# Patient Record
Sex: MALE | Race: BLACK OR AFRICAN AMERICAN | NOT HISPANIC OR LATINO
[De-identification: names, ages, dates, MRNs, and addresses within clinical notes are randomized per-mention and may not be internally consistent; named-entity substitution may affect disease eponyms.]

---

## 2018-06-22 ENCOUNTER — TRANSCRIPTION ENCOUNTER (OUTPATIENT)
Age: 73
End: 2018-06-22

## 2018-06-22 ENCOUNTER — APPOINTMENT (OUTPATIENT)
Dept: INTERNAL MEDICINE | Facility: CLINIC | Age: 73
End: 2018-06-22

## 2018-06-22 ENCOUNTER — MEDICATION RENEWAL (OUTPATIENT)
Age: 73
End: 2018-06-22

## 2018-06-22 VITALS
TEMPERATURE: 97.9 F | HEIGHT: 70 IN | WEIGHT: 155 LBS | DIASTOLIC BLOOD PRESSURE: 76 MMHG | BODY MASS INDEX: 22.19 KG/M2 | SYSTOLIC BLOOD PRESSURE: 127 MMHG | OXYGEN SATURATION: 94 % | HEART RATE: 58 BPM

## 2018-06-22 DIAGNOSIS — Z87.891 PERSONAL HISTORY OF NICOTINE DEPENDENCE: ICD-10-CM

## 2018-06-22 NOTE — HISTORY OF PRESENT ILLNESS
[FreeTextEntry1] : follow up fe def anemia [de-identified] : pt had recent low back pain and is on prednisone. He currently is taking 1 fe pill a day. Otherwise he feels well. no chest pain. no sob. stools are dark.

## 2018-06-25 LAB
FERRITIN SERPL-MCNC: 46 NG/ML
IRON SATN MFR SERPL: 38 %
IRON SERPL-MCNC: 105 UG/DL
TIBC SERPL-MCNC: 277 UG/DL
UIBC SERPL-MCNC: 172 UG/DL

## 2018-07-05 ENCOUNTER — MEDICATION RENEWAL (OUTPATIENT)
Age: 73
End: 2018-07-05

## 2018-07-23 ENCOUNTER — MEDICATION RENEWAL (OUTPATIENT)
Age: 73
End: 2018-07-23

## 2018-08-17 ENCOUNTER — MEDICATION RENEWAL (OUTPATIENT)
Age: 73
End: 2018-08-17

## 2018-08-27 ENCOUNTER — MEDICATION RENEWAL (OUTPATIENT)
Age: 73
End: 2018-08-27

## 2018-08-31 ENCOUNTER — MEDICATION RENEWAL (OUTPATIENT)
Age: 73
End: 2018-08-31

## 2018-10-11 ENCOUNTER — MEDICATION RENEWAL (OUTPATIENT)
Age: 73
End: 2018-10-11

## 2018-11-26 ENCOUNTER — MEDICATION RENEWAL (OUTPATIENT)
Age: 73
End: 2018-11-26

## 2018-12-03 ENCOUNTER — MEDICATION RENEWAL (OUTPATIENT)
Age: 73
End: 2018-12-03

## 2018-12-26 ENCOUNTER — MEDICATION RENEWAL (OUTPATIENT)
Age: 73
End: 2018-12-26

## 2018-12-28 ENCOUNTER — MEDICATION RENEWAL (OUTPATIENT)
Age: 73
End: 2018-12-28

## 2019-01-02 ENCOUNTER — MEDICATION RENEWAL (OUTPATIENT)
Age: 74
End: 2019-01-02

## 2019-01-04 ENCOUNTER — RECORD ABSTRACTING (OUTPATIENT)
Age: 74
End: 2019-01-04

## 2019-01-04 DIAGNOSIS — Z87.19 PERSONAL HISTORY OF OTHER DISEASES OF THE DIGESTIVE SYSTEM: ICD-10-CM

## 2019-01-04 DIAGNOSIS — Z87.442 PERSONAL HISTORY OF URINARY CALCULI: ICD-10-CM

## 2019-01-04 DIAGNOSIS — Z83.3 FAMILY HISTORY OF DIABETES MELLITUS: ICD-10-CM

## 2019-01-06 PROBLEM — Z87.19 HISTORY OF PANCREATITIS: Status: RESOLVED | Noted: 2019-01-06 | Resolved: 2019-01-06

## 2019-01-06 PROBLEM — Z83.3 FAMILY HISTORY OF DIABETES MELLITUS: Status: ACTIVE | Noted: 2019-01-06

## 2019-01-06 PROBLEM — Z87.442 HISTORY OF NEPHROLITHIASIS: Status: RESOLVED | Noted: 2019-01-06 | Resolved: 2019-01-06

## 2019-01-08 ENCOUNTER — NON-APPOINTMENT (OUTPATIENT)
Age: 74
End: 2019-01-08

## 2019-01-08 ENCOUNTER — MEDICATION RENEWAL (OUTPATIENT)
Age: 74
End: 2019-01-08

## 2019-01-08 ENCOUNTER — APPOINTMENT (OUTPATIENT)
Dept: CARDIOLOGY | Facility: CLINIC | Age: 74
End: 2019-01-08
Payer: COMMERCIAL

## 2019-01-08 VITALS
SYSTOLIC BLOOD PRESSURE: 138 MMHG | HEART RATE: 51 BPM | DIASTOLIC BLOOD PRESSURE: 70 MMHG | WEIGHT: 155 LBS | HEIGHT: 70 IN | BODY MASS INDEX: 22.19 KG/M2

## 2019-01-08 PROCEDURE — 99214 OFFICE O/P EST MOD 30 MIN: CPT

## 2019-01-08 PROCEDURE — 93000 ELECTROCARDIOGRAM COMPLETE: CPT

## 2019-01-08 RX ORDER — NEBIVOLOL HYDROCHLORIDE 5 MG/1
5 TABLET ORAL DAILY
Qty: 90 | Refills: 3 | Status: DISCONTINUED | COMMUNITY
End: 2019-01-08

## 2019-01-08 RX ORDER — AMLODIPINE BESYLATE 5 MG/1
5 TABLET ORAL DAILY
Qty: 90 | Refills: 3 | Status: DISCONTINUED | COMMUNITY
Start: 2018-12-03 | End: 2019-01-08

## 2019-01-08 RX ORDER — LOSARTAN POTASSIUM AND HYDROCHLOROTHIAZIDE 25; 100 MG/1; MG/1
100-25 TABLET ORAL DAILY
Qty: 90 | Refills: 3 | Status: DISCONTINUED | COMMUNITY
Start: 2018-11-26 | End: 2019-01-08

## 2019-01-08 RX ORDER — LOSARTAN POTASSIUM AND HYDROCHLOROTHIAZIDE 25; 100 MG/1; MG/1
100-25 TABLET ORAL DAILY
Qty: 90 | Refills: 3 | Status: DISCONTINUED | COMMUNITY
Start: 2018-08-31 | End: 2019-01-08

## 2019-01-08 RX ORDER — ATORVASTATIN CALCIUM 10 MG/1
10 TABLET, FILM COATED ORAL DAILY
Qty: 90 | Refills: 3 | Status: DISCONTINUED | COMMUNITY
Start: 2018-07-23 | End: 2019-01-08

## 2019-01-08 RX ORDER — ATORVASTATIN CALCIUM 10 MG/1
10 TABLET, FILM COATED ORAL DAILY
Qty: 90 | Refills: 3 | Status: DISCONTINUED | COMMUNITY
Start: 2018-10-11 | End: 2019-01-08

## 2019-01-08 RX ORDER — ATORVASTATIN CALCIUM 10 MG/1
10 TABLET, FILM COATED ORAL DAILY
Qty: 90 | Refills: 3 | Status: DISCONTINUED | COMMUNITY
Start: 2018-08-17 | End: 2019-01-08

## 2019-01-08 RX ORDER — ATORVASTATIN CALCIUM 10 MG/1
10 TABLET, FILM COATED ORAL DAILY
Qty: 90 | Refills: 3 | Status: DISCONTINUED | COMMUNITY
Start: 2018-12-26 | End: 2019-01-08

## 2019-01-08 NOTE — HISTORY OF PRESENT ILLNESS
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 He denies chest pain, dyspnea, palpitations or syncope. He exercises approximately 4 days a week with the elliptical without difficulty.

## 2019-01-15 ENCOUNTER — RX RENEWAL (OUTPATIENT)
Age: 74
End: 2019-01-15

## 2019-01-15 RX ORDER — ROSUVASTATIN CALCIUM 10 MG/1
10 TABLET, FILM COATED ORAL DAILY
Qty: 1 | Refills: 3 | Status: DISCONTINUED | COMMUNITY
Start: 2019-01-08 | End: 2019-01-15

## 2019-02-06 ENCOUNTER — APPOINTMENT (OUTPATIENT)
Dept: CARDIOLOGY | Facility: CLINIC | Age: 74
End: 2019-02-06
Payer: COMMERCIAL

## 2019-02-06 VITALS
DIASTOLIC BLOOD PRESSURE: 78 MMHG | WEIGHT: 154 LBS | HEIGHT: 70 IN | HEART RATE: 48 BPM | BODY MASS INDEX: 22.05 KG/M2 | SYSTOLIC BLOOD PRESSURE: 130 MMHG

## 2019-02-06 PROCEDURE — 93351 STRESS TTE COMPLETE: CPT

## 2019-02-06 NOTE — PHYSICAL EXAM
[General Appearance - Well Developed] : well developed [Normal Appearance] : normal appearance [Well Groomed] : well groomed [General Appearance - Well Nourished] : well nourished [No Deformities] : no deformities [General Appearance - In No Acute Distress] : no acute distress [Normal Conjunctiva] : the conjunctiva exhibited no abnormalities [Eyelids - No Xanthelasma] : the eyelids demonstrated no xanthelasmas [Normal Oral Mucosa] : normal oral mucosa [No Oral Pallor] : no oral pallor [No Oral Cyanosis] : no oral cyanosis [Normal Jugular Venous A Waves Present] : normal jugular venous A waves present [Normal Jugular Venous V Waves Present] : normal jugular venous V waves present [No Jugular Venous Mayers A Waves] : no jugular venous mayers A waves [Respiration, Rhythm And Depth] : normal respiratory rhythm and effort [Exaggerated Use Of Accessory Muscles For Inspiration] : no accessory muscle use [Auscultation Breath Sounds / Voice Sounds] : lungs were clear to auscultation bilaterally [Heart Rate And Rhythm] : heart rate and rhythm were normal [Heart Sounds] : normal S1 and S2 [Murmurs] : no murmurs present [Abdomen Soft] : soft [Abdomen Tenderness] : non-tender [Abdomen Mass (___ Cm)] : no abdominal mass palpated [Abnormal Walk] : normal gait [Gait - Sufficient For Exercise Testing] : the gait was sufficient for exercise testing [Nail Clubbing] : no clubbing of the fingernails [Cyanosis, Localized] : no localized cyanosis [Petechial Hemorrhages (___cm)] : no petechial hemorrhages [Skin Color & Pigmentation] : normal skin color and pigmentation [] : no rash [No Venous Stasis] : no venous stasis [Skin Lesions] : no skin lesions [No Skin Ulcers] : no skin ulcer [No Xanthoma] : no  xanthoma was observed [Oriented To Time, Place, And Person] : oriented to person, place, and time [Affect] : the affect was normal [Mood] : the mood was normal [No Anxiety] : not feeling anxious

## 2019-02-06 NOTE — DISCUSSION/SUMMARY
[FreeTextEntry1] : The patient is doing very well clinically. Today's stress echo revealed no evidence of exercise-induced myocardial ischemia. LV function was normal at rest and post exercise. The current medications will be continued and we are arranging for office followup.

## 2019-02-06 NOTE — REASON FOR VISIT
[FreeTextEntry1] : Patient is referred for stress echocardiography as part of his ongoing cardiology evaluation with Dr. Calle.

## 2019-02-12 LAB
ALBUMIN SERPL ELPH-MCNC: 4.9 G/DL
ALP BLD-CCNC: 50 U/L
ALT SERPL-CCNC: 16 U/L
AST SERPL-CCNC: 17 U/L
BILIRUB DIRECT SERPL-MCNC: 0.1 MG/DL
BILIRUB INDIRECT SERPL-MCNC: 0.4 MG/DL
BILIRUB SERPL-MCNC: 0.5 MG/DL
CHOLEST SERPL-MCNC: 125 MG/DL
CHOLEST/HDLC SERPL: 2.6 RATIO
HDLC SERPL-MCNC: 49 MG/DL
LDLC SERPL CALC-MCNC: 66 MG/DL
PROT SERPL-MCNC: 7.2 G/DL
TRIGL SERPL-MCNC: 50 MG/DL

## 2019-02-28 ENCOUNTER — APPOINTMENT (OUTPATIENT)
Dept: CARDIOLOGY | Facility: CLINIC | Age: 74
End: 2019-02-28
Payer: COMMERCIAL

## 2019-02-28 VITALS
SYSTOLIC BLOOD PRESSURE: 128 MMHG | HEIGHT: 70 IN | DIASTOLIC BLOOD PRESSURE: 75 MMHG | WEIGHT: 155 LBS | BODY MASS INDEX: 22.19 KG/M2 | HEART RATE: 57 BPM

## 2019-02-28 DIAGNOSIS — K64.9 UNSPECIFIED HEMORRHOIDS: ICD-10-CM

## 2019-02-28 DIAGNOSIS — N28.1 CYST OF KIDNEY, ACQUIRED: ICD-10-CM

## 2019-02-28 PROCEDURE — 99213 OFFICE O/P EST LOW 20 MIN: CPT

## 2019-02-28 PROCEDURE — 93000 ELECTROCARDIOGRAM COMPLETE: CPT

## 2019-02-28 NOTE — REASON FOR VISIT
[Follow-Up - Clinic] : a clinic follow-up of [Coronary Artery Disease] : coronary artery disease [FreeTextEntry2] : 1 month

## 2019-02-28 NOTE — HISTORY OF PRESENT ILLNESS
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 He denies chest pain, dyspnea, palpitations or syncope. He exercises approximately 4 days a week with the elliptical without difficulty. His stress echo was done feb6 th.

## 2019-03-01 ENCOUNTER — TRANSCRIPTION ENCOUNTER (OUTPATIENT)
Age: 74
End: 2019-03-01

## 2019-03-05 ENCOUNTER — LABORATORY RESULT (OUTPATIENT)
Age: 74
End: 2019-03-05

## 2019-03-18 ENCOUNTER — APPOINTMENT (OUTPATIENT)
Dept: ENDOCRINOLOGY | Facility: CLINIC | Age: 74
End: 2019-03-18
Payer: COMMERCIAL

## 2019-03-18 VITALS
SYSTOLIC BLOOD PRESSURE: 120 MMHG | HEIGHT: 70 IN | BODY MASS INDEX: 21.76 KG/M2 | DIASTOLIC BLOOD PRESSURE: 70 MMHG | HEART RATE: 56 BPM | WEIGHT: 152 LBS

## 2019-03-18 PROCEDURE — 99213 OFFICE O/P EST LOW 20 MIN: CPT

## 2019-03-18 NOTE — HISTORY OF PRESENT ILLNESS
[FreeTextEntry1] : March 18, 2019\par \par  PCP: Dr. Joe Teixeira \par             Card: Patricia Calle (Hx anginia, elev lipids \par             GI: Dr. Hayes (Hx anemia) - colonoscopy last week - OK\par             Ophthalmology: Santiago Solaresvaldemar p  f [phone]\par             Pod: Dr. KARIME Callahan - available\par             Ortho - Dr. Dee at [facility]\par            .\par            CC: Diabetes since 2007 (, A1c 11.9 %) ***\par             (anemia) \par             (L knee replaced - Dr. Lopez at [facility]) \par             (sleep challenges)\par             (ASHD)\par             ( 6/2018: back pain: scoliosis)\par             (6/2018: hepatic cyst)\par \par At [place] on\par 3/5/2019\par A1c 5.9\par B12 596\par ferritin 30 ()\par WBC 4.18\par Hct 41.3\par serum iron 103\par \par \par \par \par \par Previous notes from [employer] appended below.\par \par    Sept 17, 2018\par            .\par            PCP: Dr. Joe Teixeira \par             Card: Patricia Calle (Hx anginia, elev lipids \par             GI: Dr. Hayes (Hx anemia) - colonoscopy last week - OK\par             Ophthalmology: Santiago Solareslovaldemar p  f [phone]\par             Pod: Dr. KARIME Callahan - available\par             Ortho - Dr. Dee at [facility]\par            .\par            CC: Diabetes since 2007 (, A1c 11.9 %) ***\par             (anemia) \par             (L knee replaced - Dr. Lopez at [facility]) \par             (sleep challenges)\par             (ASHD)\par             ( 6/2018: back pain: scoliosis)\par             (6/2018: hepatic cyst)\par            .\par            5/16/2018 X-ray R hip: mild bilateral hip arthopathy....arterial vasc. calcifications, \par            .\par            6/20/2018 ER visit for back pain\par            -incidental . hepatic cyist detected\par            .\par            9/7/2018 [facility] included\par            microalbumin - 5\par            Hct 40.3\par            MCV 82.4 \par            A1c 5.8 % \par             m/gdl\par            creatinine 1.12\par            calcium 9.7 *****\par            uric acid 5.3 \par            GGT 24\par            \par            ESR 2\par            PTH 40\par            RF neg \par            immunofix: neg \par            Lyme - neg\par            25 OH vit D 45\par            PSA 2.1 \par            1,25 di OH vit D 47 (18-72)\par            ionized calcium normal\par            .\par            Impression: He is on ferrous gluconate for anemia and he would like to know his ferritin and Fe/TIBC.\par            .\par            Eye exam was excellent. .\par            .\par            ==\par            .\par            May 16, 2018\par            .\par            PCP: Dr. Joe Teixeira \par             Card: Patricia Calle (Hx anginia, elev lipids \par             GI: Dr. Hayes (Hx anemia) - colonoscopy last week - OK\par             Ophthalmology: Santiago Valles p [phone] f [phone]\par             Pod: Dr. KARIME Callahan - available\par             Ortho - Dr. Dee at [facility]\par            .\par            CC: Diabetes since 2007 (, A1c 11.9 %) ***\par             (anemia) \par             (L knee replaced - Dr. 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 \par            .\par            Impression: Feeling well.\par            Slightly elevated [facility]. \par            A1c in good range.\par            .\par            Plan: Same Rx. \par            .\par            ==\par            .\par            January 16, 2018\par            .\par            PCP: Dr. Joe Teixeira \par             Card: Patricia Calle (Hx anginia, elev lipids \par             GI: Dr. Hayes (Hx anemia) - colonoscopy last week - OK\par             Ophthalmology: Santiago Solareslovaldemar p [phone] f [phone]\par             Pod: Dr. KARIME Callahan - available\par             Ortho - Dr. Dee at [facility]\par            .\par            CC: Diabetes since 2007 (, A1c 11.9 %) ***\par             (anemia) \par             (L knee replaced - Dr. Lopez at [facility]) \par             (sleep challenges)\par             (ASHD)\par            .\par            Reports he saw ophthalmology a few months ago.\par            Currently has cough - saw Dr. Teixeira.\par            Has funny feeling in toes. \par            .\par            Impresion: He appears to be doing great !\par            .\par            ==\par            .\par            August 8, 2017\par            .\par            PCP: Dr. Joe Teixeira \par             Card: Patricia Calle (Hx anginia, elev lipids \par             GI: Dr. Hayes (Hx anemia) - colonoscopy last week - OK\par             Ophthalmology: [facility] tomorrow\par             Pod: Dr. KARIME Callahan - available\par             Ortho - Dr. Dee at [facility]\par            .\par            CC: Diabetes since 2007 (, A1c 11.9 %) ***\par             (anemia) \par             (L knee replaced - Dr. Lopez at [facility]) \par             (sleep challenges)\par             (ASHD)\par            .\par            Had colonoscopy about a year ago by Dr. Hayes.\par            Declined upper endoscopy.\par            Took ferrous gluconate.\par            Recent labs at [facility] on\par            7/25/2017 included\par            BS 98\par            TSH 2.29 \par            Hct 42.7\par            MCV 79.7\par            A1c 5.9\par            .\par            .\par            Impression: Diabetes under excellent control based on his own fingerstick sugars as well as recent A1c of 5.9%\par            .\par            Plan: He will see Dr. Teixeira very soon.\par            ROV in about six months. Thank you. -\par            .\par            ==\par            .\par            Feb 8, 2017\par            .\par            PCP: Dr. Joe Teixeira \par             Card: Patricia Calle (Hx anginia, elev lipids \par             GI: Dr. Hayes (Hx anemia) - colonoscopy last week - OK\par             Ophthalmology: [facility] tomorrow\par             Pod: Dr. KARIME Callahan - available\par             Ortho - Dr. Dee at [facility]\par            .\par            CC: Diabetes since 2007 (, A1c 11.9 %) ***\par             (anemia) \par             (L knee replaced - Dr. Lopez at [facility]) \par             (sleep challenges)\par             (ASHD)\par            .\par            On Bystolic and\par            JanuMet  BID for diabetes.\par            .\par            Recent [facility] A1c 6.2 %\par            urine microalbu 0.6 \par            .\par            Impression: Doing very well.\par            .\par            Plan: Reviewed fingerstick BS and guidelines.\par            Need for annual eye exam. \par            .\par            ==\par            .\par            August 8, 2016\par            .\par            PCP: Dr. Joe Teixeira \par             Card: Patricia Calle (Hx anginia, elev lipids \par             GI: Dr. Hayes (Hx anemia) - colonoscopy last week - OK\par             [facility]             Pod: Dr. KARIME Callahan - available\par             Ortho - Dr. Dee at [facility]\par            .\par            CC: Diabetes since 2007 (, A1c 11.9 %) ***\par             (anemia) \par             (L knee replaced - Dr. Lopez at [facility]) \par             (sleep challenges)\par             (ASHD)\par            .\par            Recent labs ([facility]) show\par             mg/dl\par            B12 461 \par            HbA1c 6.3 %\par            .\par            Impression: Doing excellent job of controlling sugars.\par            .\par            Plan: Same Rx: JanuMet, diet, exercise. \par            .\par            ==\par            .\par            February 15, 2016\par            .\par            PCP: Dr. Joe Teixeira \par             Card: Patricia Calle (Hx anginia, elev lipids \par             GI: Dr. Hayes (Hx anemia)\par             Ophthalmology: Santiago Orloff [place] good report\par             Pod: Dr. KARIME Callahan \par            .\par            CC: Diabetes since 2007 (, A1c 11.9 %) ***\par             (anemia) \par             (L knee replaced - Dr. Lopez [facility]) \par             (sleep challenges)\par             (ASHD)\par            .\par            Had recent evaluation for possible angina.\par            Studies turned out well.\par            Bystolic and isosorbide were prescribe.\par            Plans trip to [facility].\par            .\par            Eyes last checked a few months ago and was given a good report. \par            .\par            JanuMet  BID [facility] 60 days at a time\par            .\par            A1c 6.2 % (up from 6.0%)\par            .\par            Impression: Doing very well.\par            .\par            Plan: Restart fingerstick BS monitoring. \par            Updated A1c, B12, lipids in 4 months. \par            .\par            .\par            ==\par            .\par            August 19, 2015\par            .\par            PCP: Dr. Joe Teixeira \par             Card: Patricia Calle (Hx anginia, elev lipids \par             GI: Dr. Hayes (Hx anemia)\par             Ophthalmology: Santiago Orloff [place] good report\par             Pod: Dr. KARIME Callahan \par            .\par            CC: Diabetes since 2007 (, A1c 11.9 %) ***\par             (anemia) (L knee replaced - Dr. Lopez at [facility])\par             (sleep challenges)\par            .\par            Has an autographed copy of text by Bola Tim\par            Recent FBS 78 mg/dl\par            HbA1c 6.0%\par            .\par            Takes JanuMet  BID\par            .\par            Impression: Doing well. Notes some stomach rumbling so I will advise trial of decreasing the JanuMet to daily and repeating labs in about six months.\par            .\par            .\par            Plan: Same Rx and ROV six months. \par            .\par            ==\par            .\par            Feb 6, 2015\par            .\par            PCP: Dr. Joe Teixeira \par             Card: Patricia Calle (Hx anginia, elev lipids \par             GI: Dr. Hayes (Hx anemia)\par             Ophthalmology: [facility] \par             Pod: Dr. Callahan\par            .\par            CC: Diabetes since 2007 (, A1c 11.9 %)\par             (anemia) (L knee replaced - Dr. Lopez at [facility])\par             (Lyme disease)\par             (sleep challenges)\par            .\par            Now teaching genomics.\par            .\par            Lab Jan 2015\par            FBS 99, \par            Urine microalbumin normal\par            HDL 42\par            LDL 64\par            TSH 1.68 Hct 43.7\par            A1c 6.5%\par            .\par            He admits that he exercises a lot and eats properly.\par            .\par            Meds for DM: JanuMet  BID\par            Atovastin 10 mg\par            Amlodipine 5 mg\par            Hyzaar - HCTZ\par            ASA 81 mg/dl\par            Symbacort\par            .\par            Impression: Diabetes well controlled. \par            Tests fingerstick BS about once a week.\par            .\par            Plan: Same Rx and ROV 6 months.\par            .\par            .\par            .\par            .\par            ====\par            Dec 18, 2013\par            PCP: Dr. Teixeira\par            CC: DM since 2007\par            .\par            Summary of history below.\par            Going to [place] as a friend there is quite ill. \par            Says FBS may be 100 - 115. \par            FBS has drifted up a bit.\par            Will consider adding glipizide ER 2.5 mg in PM and\par            continue Janumet 50/500 BID for now.\par            ..\par            ===\par            May 17, 2013\par            PCP: Dr. Joe Teixeira Card: Patricia Calle GI: Dr. Hayes\par             Ophthalmology: Santiago Valles [facility] Pod: Dr. Callahan\par            CC: Diabetes (anemia) (L knee replaced - Dr. Lopez at [facility])\par            Tick bite Nov 2012 - Doxy from acupuncturist. \par            .\par            DM Dx'd by Dr. Teixeira 2007 after he developed dizziness and BS over 600 with A1c of 11.9%.\par            Last note appended below. Has seen Dr. Calle - has angina, heart murmur. He is supposed to restart his walking, eliptical, etc. \par            .\par            Remains on Janumet 50/500 BID Recent A1c higher at 6.7%. Urine microalbumin within normal limits. Lipids not part of this panel.\par            .\par            .\par            Did not f/u to GI regarding anemia, but it resolved on Fe. \par            He feels that muscle pain may benefit from re-starting HGH. \par            .\par            .\par            +++++++++\par            # Hx angina - Dr. Calle - on Lipitor\par            # Recent anemia - colonoscopy by Dr. Hayes, declined upper endoscopy, Hct now back to normal\par            # Diabetes: on Janumet 50/500 BID. FBS in good range. A1c 6.1% Feels well. Recentlyu went to [place]. February 15, 2016\par            .\par            PCP: Dr. Joe Teixeira \par             Card: Patricia Calle (Hx anginia, elev lipids \par             GI: Dr. Hayes (Hx anemia)\par             [facility] [place] [facility]\par             Pod: Dr. KARIME Callahan \par            .\par            CC: Diabetes since 2007 (, A1c 11.9 %) ***\par             (anemia) \par             (L knee replaced - Dr. Lopez at [facility]) \par             (sleep challenges)\par             (ASHD)\par            .\par            Had recent evaluation for possible angina.\par            Studies turned out well.\par            Bystolic and isosorbide were prescribe.\par            Plans trip to [facility].\par            .\par            Eyes last checked a few months ago and was given a good report. \par            .\par            JanuMet  BID [facility] 60 days at a time\par            .\par            A1c 6.2 % (up from 6.0%)\par            .\par            Impression: Doing very well.\par            .\par            Plan: Restart fingerstick BS monitoring. \par            Updated A1c, B12, lipids in 4 months. \par            .\par            .\par            ==\par            .\par            August 19, 2015\par            .\par            PCP: Dr. Joe Teixeira \par             Card: Patricia Calle (Hx anginia, elev lipids \par             GI: Dr. Hayes (Hx anemia)\par             Ophthalmology: Santiago Valles [place] good report\par             Pod: Dr. KARIME Callahan \par            .\par            CC: Diabetes since 2007 (, A1c 11.9 %) ***\par             (anemia) (L knee replaced - Dr. Lopez at [facility])\par             (sleep challenges)\par            .\par            Has an autographed copy of text by Bola Tim\par            Recent FBS 78 mg/dl\par            HbA1c 6.0%\par            .\par            Takes JanuMet  BID\par            .\par            Impression: Doing well. Notes some stomach rumbling so I will advise trial of decreasing the JanuMet to daily and repeating labs in about six months.\par            .\par            .\par            Plan: Same Rx and ROV six months. \par            .\par            ==\par            .\par            Feb 6, 2015\par            .\par            PCP: Dr. Joe Teixeira \par             Card: Patricia Calle (Hx anginia, elev lipids \par             GI: Dr. Hayes (Hx anemia)\par             Ophthalmology: Santiago Valles [facility] \par             Pod: Dr. Callahan\par            .\par            CC: Diabetes since 2007 (, A1c 11.9 %)\par             (anemia) (L knee replaced - Dr. Lopez at [facility])\par             (Lyme disease)\par             (sleep challenges)\par            .\par            Now teaching genomics.\par            .\par            Lab Jan 2015\par            FBS 99, \par            Urine microalbumin normal\par            HDL 42\par            LDL 64\par            TSH 1.68 Hct 43.7\par            A1c 6.5%\par            .\par            He admits that he exercises a lot and eats properly.\par            .\par            Meds for DM: JanuMet  BID\par            Atovastin 10 mg\par            Amlodipine 5 mg\par            Hyzaar - HCTZ\par            ASA 81 mg/dl\par            Symbacort\par            .\par            Impression: Diabetes well controlled. \par            Tests fingerstick BS about once a week.\par            .\par            Plan: Same Rx and ROV 6 months.\par            .\par            .\par            .\par            .\par            ====\par            Dec 18, 2013\par            PCP: Dr. Teixeira\par            CC: DM since 2007\par            .\par            Summary of history below.\par            Going to [place] as a friend there is quite ill. \par            Says FBS may be 100 - 115. \par            FBS has drifted up a bit.\par            Will consider adding glipizide ER 2.5 mg in PM and\par            continue Janumet 50/500 BID for now.\par            ..\par            ===\par            May 17, 2013\par            PCP: Dr. Joe Teixeira Card: Patricia Calle GI: Dr. Hayes\par             Ophthalmology: [facility] Pod: Dr. Callahan\par            CC: Diabetes (anemia) (L knee replaced - Dr. Lopez at [facility])\par            Tick bite Nov 2012 - Doxy from acupuncturist. \par            .\par            DM Dx'd by Dr. Teixeira 2007 after he developed dizziness and BS over 600 with A1c of 11.9%.\par            Last note appended below. Has seen Dr. Calle - has angina, heart murmur. He is supposed to restart his walking, eliptical, etc. \par            .\par            Remains on Janumet 50/500 BID Recent A1c higher at 6.7%. Urine microalbumin within normal limits. Lipids not part of this panel.\par            .\par            .\par            Did not f/u to GI regarding anemia, but it resolved on Fe. \par            He feels that muscle pain may benefit from re-starting HGH. \par            .\par            .\par            +++++++++\par            # Hx angina - Dr. Calle - on Lipitor\par            # Recent anemia - colonoscopy by Dr. Hayes, declined upper endoscopy, Hct now back to normal\par            # Diabetes: on Janumet 50/500 BID. FBS in good range. A1c 6.1% Feels well. Recentlyu went to [place]. February 15, 2016\par            .\par            PCP: Dr. Joe Teixeira \par             Card: Patricia Calle (Hx anginia, elev lipids \par             GI: Dr. Hayes (Hx anemia)\par             Ophthalmology: Santiago Orloff [place] good report\par             Pod: Dr. KARIME Callahan \par            .\par            CC: Diabetes since 2007 (, A1c 11.9 %) ***\par             (anemia) \par             (L knee replaced - Dr. Lopez at [facility]) \par             (sleep challenges)\par             (ASHD)\par            .\par            Had recent evaluation for possible angina.\par            Studies turned out well.\par            Bystolic and isosorbide were prescribe.\par            Plans trip to [facility].\par            .\par            Eyes last checked a few months ago and was given a good report. \par            .\par            JanuMet  BID [facility] 60 days at a time\par            .\par            A1c 6.2 % (up from 6.0%)\par            .\par            Impression: Doing very well.\par            .\par            Plan: Restart fingerstick BS monitoring. \par            Updated A1c, B12, lipids in 4 months. \par            .\par            .\par            ==\par            .\par            August 19, 2015\par            .\par            PCP: Dr. Joe Teixeira \par             Card: Patrciia Calle (Hx anginia, elev lipids \par             GI: Dr. Hayes (Hx anemia)\par             Ophthalmology: Santiago Solaresvaldemar [place] good report\par             Pod: Dr. KARIME Callahan \par            .\par            CC: Diabetes since 2007 (, A1c 11.9 %) ***\par             (anemia) (L knee replaced - Dr. Lopez at [facility])\par             (sleep challenges)\par            .\par            Has an autographed copy of text by Bola Tim\par            Recent FBS 78 mg/dl\par            HbA1c 6.0%\par            .\par            Takes JanuMet  BID\par            .\par            Impression: Doing well. Notes some stomach rumbling so I will advise trial of decreasing the JanuMet to daily and repeating labs in about six months.\par            .\par            .\par            Plan: Same Rx and ROV six months. \par            .\par            ==\par            .\par            Feb 6, 2015\par            .\par            PCP: Dr. Joe Teixeira \par             Card: Patricia Calle (Hx anginia, elev lipids \par             GI: Dr. Hayes (Hx anemia)\par             Ophthalmology: Santiago Valles [facility] \par             Pod: Dr. Callahan\par            .\par            CC: Diabetes since 2007 (, A1c 11.9 %)\par             (anemia) (L knee replaced - Dr. Lopez at [facility])\par             (Lyme disease)\par             (sleep challenges)\par            .\par            Now teaching genomics.\par            .\par            Lab Jan 2015\par            FBS 99, \par            Urine microalbumin normal\par            HDL 42\par            LDL 64\par            TSH 1.68 Hct 43.7\par            A1c 6.5%\par            .\par            He admits that he exercises a lot and eats properly.\par            .\par            Meds for DM: JanuMet  BID\par            Atovastin 10 mg\par            Amlodipine 5 mg\par            Hyzaar - HCTZ\par            ASA 81 mg/dl\par            Symbacort\par            .\par            Impression: Diabetes well controlled. \par            Tests fingerstick BS about once a week.\par            .\par            Plan: Same Rx and ROV 6 months.\par            .\par            .\par            .\par            .\par            ====\par            Dec 18, 2013\par            PCP: Dr. Teixeira\par            CC: DM since 2007\par            .\par            Summary of history below.\par            Going to [place] as a friend there is quite ill. \par            Says FBS may be 100 - 115. \par            FBS has drifted up a bit.\par            Will consider adding glipizide ER 2.5 mg in PM and\par            continue Janumet 50/500 BID for now.\par            ..\par            ===\par            May 17, 2013\par            PCP: Dr. Joe Teixeira Card: Patricia Calle GI: Dr. Hayes\par             Ophthalmology: Santiago Valles [facility] Pod: Dr. Callahan\par            CC: Diabetes (anemia) (L knee replaced - Dr. Lopez at [facility])\par            Tick bite Nov 2012 - Doxy from acupuncturist. \par            .\par            DM Dx'd by Dr. Teixeira 2007 after he developed dizziness and BS over 600 with A1c of 11.9%.\par            Last note appended below. Has seen Dr. Calle - has angina, heart murmur. He is supposed to restart his walking, eliptical, etc. \par            .\par            Remains on Janumet 50/500 BID Recent A1c higher at 6.7%. Urine microalbumin within normal limits. Lipids not part of this panel.\par            .\par            .\par            Did not f/u to GI regarding anemia, but it resolved on Fe. \par            He feels that muscle pain may benefit from re-starting HGH. \par            .\par            .\par            +++++++++\par            # Hx angina - Dr. Calle - on Lipitor\par            # Recent anemia - colonoscopy by Dr. Hayes, declined upper endoscopy, Hct now back to normal\par            # Diabetes: on Janumet 50/500 BID. FBS in good range. A1c 6.1% Feels well. Recentlyu went to [place]\par

## 2019-03-18 NOTE — PHYSICAL EXAM
[No Acute Distress] : no acute distress [Alert] : alert [Well Developed] : well developed [Well Nourished] : well nourished [Normal Sclera/Conjunctiva] : normal sclera/conjunctiva [EOMI] : extra ocular movement intact [No Proptosis] : no proptosis [Normal Oropharynx] : the oropharynx was normal [Thyroid Not Enlarged] : the thyroid was not enlarged [No Thyroid Nodules] : there were no palpable thyroid nodules [No Accessory Muscle Use] : no accessory muscle use [No Respiratory Distress] : no respiratory distress [Clear to Auscultation] : lungs were clear to auscultation bilaterally [Normal Rate] : heart rate was normal  [Normal S1, S2] : normal S1 and S2 [Regular Rhythm] : with a regular rhythm [Pedal Pulses Normal] : the pedal pulses are present [No Edema] : there was no peripheral edema [Normal Bowel Sounds] : normal bowel sounds [Not Tender] : non-tender [Soft] : abdomen soft [Not Distended] : not distended [Anterior Cervical Nodes] : anterior cervical nodes [Post Cervical Nodes] : posterior cervical nodes [Axillary Nodes] : axillary nodes [Normal] : normal and non tender [No Spinal Tenderness] : no spinal tenderness [Spine Straight] : spine straight [No Stigmata of Cushings Syndrome] : no stigmata of cushings syndrome [Normal Gait] : normal gait [Normal Strength/Tone] : muscle strength and tone were normal [No Rash] : no rash [Acanthosis Nigricans] : no acanthosis nigricans [Normal Reflexes] : deep tendon reflexes were 2+ and symmetric [No Tremors] : no tremors [Oriented x3] : oriented to person, place, and time

## 2019-05-10 ENCOUNTER — APPOINTMENT (OUTPATIENT)
Dept: INTERNAL MEDICINE | Facility: CLINIC | Age: 74
End: 2019-05-10
Payer: COMMERCIAL

## 2019-05-10 VITALS
OXYGEN SATURATION: 98 % | BODY MASS INDEX: 21.62 KG/M2 | WEIGHT: 151 LBS | HEART RATE: 44 BPM | SYSTOLIC BLOOD PRESSURE: 110 MMHG | HEIGHT: 70 IN | DIASTOLIC BLOOD PRESSURE: 80 MMHG

## 2019-05-10 LAB
APPEARANCE: CLEAR
BILIRUBIN URINE: NORMAL
BLOOD URINE: NORMAL
COLOR: YELLOW
GLUCOSE QUALITATIVE U: NORMAL
KETONES URINE: NORMAL
LEUKOCYTE ESTERASE URINE: NORMAL
NITRITE URINE: NORMAL
PH URINE: 5.5
PROTEIN URINE: NORMAL
SPECIFIC GRAVITY URINE: 1.02
UROBILINOGEN URINE: 0.2

## 2019-05-10 PROCEDURE — 36415 COLL VENOUS BLD VENIPUNCTURE: CPT

## 2019-05-10 PROCEDURE — 99214 OFFICE O/P EST MOD 30 MIN: CPT | Mod: 25

## 2019-05-10 NOTE — PHYSICAL EXAM
[No Acute Distress] : no acute distress [Well Nourished] : well nourished [Well Developed] : well developed [Well-Appearing] : well-appearing [PERRL] : pupils equal round and reactive to light [Normal Sclera/Conjunctiva] : normal sclera/conjunctiva [EOMI] : extraocular movements intact [Normal Outer Ear/Nose] : the outer ears and nose were normal in appearance [Normal Oropharynx] : the oropharynx was normal [No JVD] : no jugular venous distention [Supple] : supple [No Lymphadenopathy] : no lymphadenopathy [Thyroid Normal, No Nodules] : the thyroid was normal and there were no nodules present [No Respiratory Distress] : no respiratory distress  [Clear to Auscultation] : lungs were clear to auscultation bilaterally [No Accessory Muscle Use] : no accessory muscle use [Normal Rate] : normal rate  [Regular Rhythm] : with a regular rhythm [No Murmur] : no murmur heard [Normal S1, S2] : normal S1 and S2 [No Carotid Bruits] : no carotid bruits [No Abdominal Bruit] : a ~M bruit was not heard ~T in the abdomen [No Varicosities] : no varicosities [Pedal Pulses Present] : the pedal pulses are present [No Edema] : there was no peripheral edema [No Extremity Clubbing/Cyanosis] : no extremity clubbing/cyanosis [No Palpable Aorta] : no palpable aorta [Non Tender] : non-tender [Soft] : abdomen soft [Non-distended] : non-distended [No HSM] : no HSM [No Masses] : no abdominal mass palpated [Normal Sphincter Tone] : normal sphincter tone [Normal Bowel Sounds] : normal bowel sounds [No Mass] : no mass [Normal Posterior Cervical Nodes] : no posterior cervical lymphadenopathy [Normal Anterior Cervical Nodes] : no anterior cervical lymphadenopathy [No CVA Tenderness] : no CVA  tenderness [No Spinal Tenderness] : no spinal tenderness [Grossly Normal Strength/Tone] : grossly normal strength/tone [No Joint Swelling] : no joint swelling [No Rash] : no rash [Normal Gait] : normal gait [Coordination Grossly Intact] : coordination grossly intact [No Focal Deficits] : no focal deficits [Normal Affect] : the affect was normal [Deep Tendon Reflexes (DTR)] : deep tendon reflexes were 2+ and symmetric [Normal Insight/Judgement] : insight and judgment were intact [FreeTextEntry1] : 2+ prostate

## 2019-05-10 NOTE — HISTORY OF PRESENT ILLNESS
[FreeTextEntry1] : Routine yearly followup [de-identified] : Patient is a 73-year-old male with a history of coronary artery disease, diabetes, hypertension and asthma. He is very well controlled with no symptoms of chest pain, shortness of breath. He has seen his endocrinologist last hemoglobin A1c was 5.9. He had a stress echo in February which was normal. Patient is is entirely asymptomatic. He does have occasional right shoulder pain. He wants to have his measles titers checked.

## 2019-05-10 NOTE — ASSESSMENT
[FreeTextEntry1] : Patient is clinically stable. Labs were reviewed. Patient is to continue current medication. We'll check a PSA and a measles titer. Patient is to return in 6 months

## 2019-05-15 LAB
MEV IGG FLD QL IA: 31.7 AU/ML
MEV IGG+IGM SER-IMP: POSITIVE
PSA SERPL-MCNC: 1.88 NG/ML

## 2019-05-22 ENCOUNTER — APPOINTMENT (OUTPATIENT)
Dept: INTERNAL MEDICINE | Facility: CLINIC | Age: 74
End: 2019-05-22
Payer: COMMERCIAL

## 2019-05-22 VITALS
OXYGEN SATURATION: 96 % | HEART RATE: 46 BPM | SYSTOLIC BLOOD PRESSURE: 130 MMHG | BODY MASS INDEX: 20.9 KG/M2 | WEIGHT: 146 LBS | DIASTOLIC BLOOD PRESSURE: 80 MMHG | HEIGHT: 70 IN

## 2019-05-22 PROCEDURE — 99214 OFFICE O/P EST MOD 30 MIN: CPT

## 2019-05-22 NOTE — ASSESSMENT
[FreeTextEntry1] : Patient apparently had an asthma flare and to cat dander exposure. He is now clinically well. He remains on Symbicort. We'll make sure patient has a rescue inhaler available. Patient is to monitor his weight if he is continuing to lose weight will require CAT scans. Issues regarding living will have been discussed.

## 2019-05-22 NOTE — PHYSICAL EXAM

## 2019-05-22 NOTE — HISTORY OF PRESENT ILLNESS
[FreeTextEntry1] : Patient for evaluation of recent asthma flare [de-identified] : One week ago the patient was traveling to [place]. He was exposed to cat dander and had a major episode with shortness of breath, chest tightness and wheezing. He went to the emergency room and was hospitalized for one day. He was discharged on 4 days of prednisone. He feels fine now without shortness of breath check chest tightness or wheezing. Patient is also a bit concerned that he's been losing weight possibly about 5 pounds over the past one month. His diet is unchanged and he exercises regularly. He otherwise feels well. Patient also brings up the issue of his living will wishes. He states he does not want to be kept alive on artificial means if there is no hope for survival or significant improvement. If there is a reversible factor he would want to be kept alive on artificial means.

## 2019-05-22 NOTE — REVIEW OF SYSTEMS
[Recent Change In Weight] : ~T recent weight change [Negative] : Heme/Lymph [FreeTextEntry2] : see above

## 2019-06-07 ENCOUNTER — RX RENEWAL (OUTPATIENT)
Age: 74
End: 2019-06-07

## 2019-06-12 ENCOUNTER — MEDICATION RENEWAL (OUTPATIENT)
Age: 74
End: 2019-06-12

## 2019-07-08 ENCOUNTER — RX RENEWAL (OUTPATIENT)
Age: 74
End: 2019-07-08

## 2019-07-15 ENCOUNTER — APPOINTMENT (OUTPATIENT)
Dept: CARDIOLOGY | Facility: CLINIC | Age: 74
End: 2019-07-15

## 2019-07-23 ENCOUNTER — MEDICATION RENEWAL (OUTPATIENT)
Age: 74
End: 2019-07-23

## 2019-08-23 ENCOUNTER — RX RENEWAL (OUTPATIENT)
Age: 74
End: 2019-08-23

## 2019-09-12 ENCOUNTER — NON-APPOINTMENT (OUTPATIENT)
Age: 74
End: 2019-09-12

## 2019-09-12 ENCOUNTER — APPOINTMENT (OUTPATIENT)
Dept: CARDIOLOGY | Facility: CLINIC | Age: 74
End: 2019-09-12
Payer: COMMERCIAL

## 2019-09-12 VITALS
SYSTOLIC BLOOD PRESSURE: 140 MMHG | HEIGHT: 70 IN | HEART RATE: 57 BPM | DIASTOLIC BLOOD PRESSURE: 70 MMHG | WEIGHT: 145 LBS | BODY MASS INDEX: 20.76 KG/M2

## 2019-09-12 DIAGNOSIS — N52.9 MALE ERECTILE DYSFUNCTION, UNSPECIFIED: ICD-10-CM

## 2019-09-12 PROCEDURE — 93000 ELECTROCARDIOGRAM COMPLETE: CPT

## 2019-09-12 PROCEDURE — 99214 OFFICE O/P EST MOD 30 MIN: CPT

## 2019-09-12 RX ORDER — LOSARTAN POTASSIUM AND HYDROCHLOROTHIAZIDE 25; 100 MG/1; MG/1
100-25 TABLET ORAL DAILY
Refills: 0 | Status: DISCONTINUED | COMMUNITY
End: 2019-09-12

## 2019-09-12 RX ORDER — ATORVASTATIN CALCIUM 10 MG/1
10 TABLET, FILM COATED ORAL DAILY
Qty: 90 | Refills: 3 | Status: DISCONTINUED | COMMUNITY
Start: 2019-01-08 | End: 2019-09-12

## 2019-09-12 NOTE — HISTORY OF PRESENT ILLNESS
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 He denies chest pain, dyspnea, palpitations or syncope. He exercises approximately 4 days a week with the elliptical without difficulty. \par He had an er visit for asthma  in May while visiting a friend with a cat at [facility], he was kept overnight followed up with Dr Teixeira.\par He denies chest pain, dyspnea, palpitations or syncope.\par He goes to the gym or walks up the [place] at least every other day.

## 2019-09-12 NOTE — REASON FOR VISIT
[Follow-Up - Clinic] : a clinic follow-up of [Coronary Artery Disease] : coronary artery disease [FreeTextEntry2] : 6 month

## 2019-10-25 ENCOUNTER — APPOINTMENT (OUTPATIENT)
Dept: ENDOCRINOLOGY | Facility: CLINIC | Age: 74
End: 2019-10-25
Payer: COMMERCIAL

## 2019-10-25 VITALS
HEIGHT: 70 IN | WEIGHT: 144 LBS | SYSTOLIC BLOOD PRESSURE: 120 MMHG | HEART RATE: 52 BPM | BODY MASS INDEX: 20.62 KG/M2 | DIASTOLIC BLOOD PRESSURE: 70 MMHG

## 2019-10-25 PROCEDURE — 36415 COLL VENOUS BLD VENIPUNCTURE: CPT

## 2019-10-25 PROCEDURE — 99214 OFFICE O/P EST MOD 30 MIN: CPT | Mod: 25

## 2019-10-25 NOTE — HISTORY OF PRESENT ILLNESS
[FreeTextEntry1] : Oct 25, 2019\par \par PCP: Dr. Joe Teixeira \par             Card: Patricia Calle (Hx anginia, elev lipids \par             GI: Dr. Hayes (Hx anemia) - colonoscopy last week - OK\par             Ophthalmology: Santiago Valles p  f [phone]\par             Pod: Dr. KARIME Callahan - available\par             Ortho - Dr. Dee at [facility]\par            .\par            CC: Diabetes since 2007 (, A1c 11.9 %) ***\par             (anemia) \par             (L knee replaced - Dr. Lopez at [facility]) \par             (sleep challenges)\par             (ASHD)\par             ( 6/2018: back pain: scoliosis)\par             (6/2018: hepatic cyst)\par \par Visits for diabetes for which he takes JanuMet  BID.\par NR\par Last A1c in March 2019 was 5.9 %\par \par Impression:  By history, doing well.  Last saw ophthalmology during the summer.\par \par Plan:  Updated A1c today.\par ROV in March or April with a few records.  \par \par \par \par \par March 18, 2019\par \par  PCP: Dr. Joe Teixeira \par             Card: Patricia Calle (Hx anginia, elev lipids \par             GI: Dr. Hayes (Hx anemia) - colonoscopy last week - OK\par             Ophthalmology: Santiago Valles p  f [phone]\par             Pod: Dr. KARIME Callahan - available\par             Ortho - Dr. Dee at [facility]\par            .\par            CC: Diabetes since 2007 (, A1c 11.9 %) ***\par             (anemia) \par             (L knee replaced - Dr. Lopez at [facility]) \par             (sleep challenges)\par             (ASHD)\par             ( 6/2018: back pain: scoliosis)\par             (6/2018: hepatic cyst)\par \par At [place] on\par 3/5/2019\par A1c 5.9\par B12 596\par ferritin 30 ()\par WBC 4.18\par Hct 41.3\par serum iron 103\par \par \par \par \par \par Previous notes from [employer] appended below.\par \par    Sept 17, 2018\par            .\par            PCP: Dr. Joe Teixeira \par             Card: Patricia aClle (Hx anginia, elev lipids \par             GI: Dr. Hayes (Hx anemia) - colonoscopy last week - OK\par             Ophthalmology: Santiago Valles p  f [phone]\par             Pod: Dr. KARIME Cabrerar - available\par             Ortho - Dr. Dee at  -  [facility]\par            .\par            CC: Diabetes since 2007 (, A1c 11.9 %) ***\par             (anemia) \par             (L knee replaced - Dr. Lopez at [facility]) \par             (sleep challenges)\par             (ASHD)\par             ( 6/2018: back pain: scoliosis)\par             (6/2018: hepatic cyst)\par            .\par            5/16/2018 X-ray R hip: mild bilateral hip arthopathy....arterial vasc. calcifications, \par            .\par            6/20/2018 ER visit for back pain\par            -incidental . hepatic cyist detected\par            .\par            9/7/2018 [facility] included\par            microalbumin - 5\par            Hct 40.3\par            MCV 82.4 \par            A1c 5.8 % \par             m/gdl\par            creatinine 1.12\par            calcium 9.7 *****\par            uric acid 5.3 \par            GGT 24\par            \par            ESR 2\par            PTH 40\par            RF neg \par            immunofix: neg \par            Lyme - neg\par            25 OH vit D 45\par            PSA 2.1 \par            1,25 di OH vit D 47 (18-72)\par            ionized calcium normal\par            .\par            Impression: He is on ferrous gluconate for anemia and he would like to know his ferritin and Fe/TIBC.\par            .\par            Eye exam was excellent. .\par            .\par            ==\par            .\par            May 16, 2018\par            .\par            PCP: Dr. Joe Teixeira \par             Card: Patricia Calle (Hx anginia, elev lipids \par             GI: Dr. Hayes (Hx anemia) - colonoscopy last week - OK\par             Ophthalmology: Santiago Valles p  f [phone]\par             Pod: Dr. KARIME Callahan - available\par             Ortho - Dr. Dee at [facility]\par            .\par            CC: Diabetes since 2007 (, A1c 11.9 %) ***\par             (anemia) \par             (L knee replaced - Dr. 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 \par            .\par            Impression: Feeling well.\par            Slightly elevated [facility]. \par            A1c in good range.\par            .\par            Plan: Same Rx. \par            .\par            ==\par            .\par            January 16, 2018\par            .\par            PCP: Dr. Joe Teixeira \par             Card: Patricia Calle (Hx anginia, elev lipids \par             GI: Dr. Hayes (Hx anemia) - colonoscopy last week - OK\par             Ophthalmology: Santiago Valles p  f [phone]\par             Pod: Dr. KARIME Callahan - available\par             Ortho - Dr. Dee at [facility]\par            .\par            CC: Diabetes since 2007 (, A1c 11.9 %) ***\par             (anemia) \par             (L knee replaced - Dr. Lopez at [facility]) \par             (sleep challenges)\par             (ASHD)\par            .\par            Reports he saw ophthalmology a few months ago.\par            Currently has cough - saw Dr. Teixeira.\par            Has funny feeling in toes. \par            .\par            Impresion: He appears to be doing great !\par            .\par            ==\par            .\par            August 8, 2017\par            .\par            PCP: Dr. Joe Teixeira \par             Card: Patricia Calle (Hx anginia, elev lipids \par             GI: Dr. Hayes (Hx anemia) - colonoscopy last week - OK\par             [facility]: [facility]\par             Pod: Dr. KARIME Callahan - available\par             Ortho - Dr. Dee at [facility]\par            .\par            CC: Diabetes since 2007 (, A1c 11.9 %) ***\par             (anemia) \par             (L knee replaced - Dr. Lopez at [facility]) \par             (sleep challenges)\par             (ASHD)\par            .\par            Had colonoscopy about a year ago by Dr. Hayes.\par            Declined upper endoscopy.\par            Took ferrous gluconate.\par            Recent labs at [facility] on\par            7/25/2017 included\par            BS 98\par            TSH 2.29 \par            Hct 42.7\par            MCV 79.7\par            A1c 5.9\par            .\par            .\par            Impression: Diabetes under excellent control based on his own fingerstick sugars as well as recent A1c of 5.9%\par            .\par            Plan: He will see Dr. Teixeira very soon.\par            ROV in about six months. Thank you. -jh\par            .\par            ==\par            .\par            Feb 8, 2017\par            .\par            PCP: Dr. Joe Teixeira \par             Card: Patricia Calle (Hx anginia, elev lipids \par             GI: Dr. Hayes (Hx anemia) - colonoscopy last week - OK\par             Ophthalmology: [facility] tomorrow\par             Pod: Dr. KARIME Callahan - available\par             Ortho - Dr. Dee at  -  [facility]\par            .\par            CC: Diabetes since 2007 (, A1c 11.9 %) ***\par             (anemia) \par             (L knee replaced - Dr. Lopez at [facility]) \par             (sleep challenges)\par             (ASHD)\par            .\par            On Bystolic and\par            JanuMet  BID for diabetes.\par            .\par            Recent [facility] A1c 6.2 %\par            urine microalbu 0.6 \par            .\par            Impression: Doing very well.\par            .\par            Plan: Reviewed fingerstick BS and guidelines.\par            Need for annual eye exam. \par            .\par            ==\par            .\par            August 8, 2016\par            .\par            PCP: Dr. Joe Teixeira \par             Card: Patricia Calle (Hx anginia, elev lipids \par             GI: Dr. Hayes (Hx anemia) - colonoscopy last week - OK\par             Ophthalmology: [facility] tomorrow\par             Pod: Dr. S. Proner - available\par             Ortho - Dr. Dee at  - [facility]\par            .\par            CC: Diabetes since 2007 (, A1c 11.9 %) ***\par             (anemia) \par             (L knee replaced - Dr. Lopez at [facility]) \par             (sleep challenges)\par             (ASHD)\par            .\par            Recent labs ([facility]) show\par             mg/dl\par            B12 461 \par            HbA1c 6.3 %\par            .\par            Impression: Doing excellent job of controlling sugars.\par            .\par            Plan: Same Rx: JanuMet, diet, exercise. \par            .\par            ==\par            .\par            February 15, 2016\par            .\par            PCP: Dr. Joe Teixeira \par             Card: Patricia Calle (Hx anginia, elev lipids \par             GI: Dr. Hayes (Hx anemia)\par             [facility] good [facility]\par             Pod: Dr. KARIME Callahan \par            .\par            CC: Diabetes since 2007 (, A1c 11.9 %) ***\par             (anemia) \par             (L knee replaced - Dr. Lopez at [facility]) \par             (sleep challenges)\par             (ASHD)\par            .\par            Had recent evaluation for possible angina.\par            Studies turned out well.\par            Bystolic and isosorbide were prescribe.\par            Plans trip to [facility].\par            .\par            Eyes last checked a few months ago and was given a good report. \par            .\par            JanuMet  BID [facility] 60 days at a time\par            .\par            A1c 6.2 % (up from 6.0%)\par            .\par            Impression: Doing very well.\par            .\par            Plan: Restart fingerstick BS monitoring. \par            Updated A1c, B12, lipids in 4 months. \par            .\par            .\par            ==\par            .\par            August 19, 2015\par            .\par            PCP: Dr. Joe Teixeira \par             Card: Patricia Calle (Hx anginia, elev lipids \par             GI: Dr. Hayes (Hx anemia)\par             [facility]\par             Pod: Dr. KARIME Callahan \par            .\par            CC: Diabetes since 2007 (, A1c 11.9 %) ***\par             (anemia) (L knee replaced - Dr. Lopez at [facility])\par             (sleep challenges)\par            .\par            Has an autographed copy of text by Bola Tim\par            Recent FBS 78 mg/dl\par            HbA1c 6.0%\par            .\par            Takes JanuMet  BID\par            .\par            Impression: Doing well. Notes some stomach rumbling so I will advise trial of decreasing the JanuMet to daily and repeating labs in about six months.\par            .\par            .\par            Plan: Same Rx and ROV six months. \par            .\par            ==\par            .\par            Feb 6, 2015\par            .\par            PCP: Dr. Joe Teixeira \par             Card: Patricia Calle (Hx anginia, elev lipids \par             GI: Dr. Hayes (Hx anemia)\par             [facility] \par             Pod: Dr. Callahan\par            .\par            CC: Diabetes since 2007 (, A1c 11.9 %)\par             (anemia) (L knee replaced - Dr. Lopez at [facility])\par             (Lyme disease)\par             (sleep challenges)\par            .\par            Now teaching genomics.\par            .\par            Lab Jan 2015\par            FBS 99, \par            Urine microalbumin normal\par            HDL 42\par            LDL 64\par            TSH 1.68 Hct 43.7\par            A1c 6.5%\par            .\par            He admits that he exercises a lot and eats properly.\par            .\par            Meds for DM: JanuMet  BID\par            Atovastin 10 mg\par            Amlodipine 5 mg\par            Hyzaar - HCTZ\par            ASA 81 mg/dl\par            Symbacort\par            .\par            Impression: Diabetes well controlled. \par            Tests fingerstick BS about once a week.\par            .\par            Plan: Same Rx and ROV 6 months.\par            .\par            .\par            .\par            .\par            ====\par            Dec 18, 2013\par            PCP: Dr. Teixeira\par            CC: DM since 2007\par            .\par            Summary of history below.\par            Going to [place] as a friend there is quite ill. \par            Says FBS may be 100 - 115. \par            FBS has drifted up a bit.\par            Will consider adding glipizide ER 2.5 mg in PM and\par            continue Janumet 50/500 BID for now.\par            ..\par            ===\par            May 17, 2013\par            PCP: Dr. Joe Teixeira Card: Patricia Calle GI: Dr. Hayes\par             Ophthalmology: [facility] Pod: Dr. Callahan\par            CC: Diabetes (anemia) (L knee replaced - Dr. Lopez at [facility])\par            Tick bite Nov 2012 - Doxy from acupuncturist. \par            .\par            DM Dx'd by Dr. Teixeira 2007 after he developed dizziness and BS over 600 with A1c of 11.9%.\par            Last note appended below. Has seen Dr. Calle - has angina, heart murmur. He is supposed to restart his walking, eliptical, etc. \par            .\par            Remains on Janumet 50/500 BID Recent A1c higher at 6.7%. Urine microalbumin within normal limits. Lipids not part of this panel.\par            .\par            .\par            Did not f/u to GI regarding anemia, but it resolved on Fe. \par            He feels that muscle pain may benefit from re-starting HGH. \par            .\par            .\par            +++++++++\par            # Hx angina - Dr. Calle - on Lipitor\par            # Recent anemia - colonoscopy by Dr. Hayes, declined upper endoscopy, Hct now back to normal\par            # Diabetes: on Janumet 50/500 BID. FBS in good range. A1c 6.1% Feels well. Recentlyu went to [place]. February 15, 2016\par            .\par            PCP: Dr. Joe Teixeira \par             Card: Patricia Calle (Hx anginia, elev lipids \par             GI: Dr. Hayes (Hx anemia)\par             Ophthalmology: [facility] [place] good report\par             Pod: Dr. KARIME Callahan \par            .\par            CC: Diabetes since 2007 (, A1c 11.9 %) ***\par             (anemia) \par             (L knee replaced - Dr. Lopez at [facility]) \par             (sleep challenges)\par             (ASHD)\par            .\par            Had recent evaluation for possible angina.\par            Studies turned out well.\par            Bystolic and isosorbide were prescribe.\par            Plans trip to [facility].\par            .\par            Eyes last checked a few months ago and was given a good report. \par            .\par            JanuMet  BID [facility] 60 days at a time\par            .\par            A1c 6.2 % (up from 6.0%)\par            .\par            Impression: Doing very well.\par            .\par            Plan: Restart fingerstick BS monitoring. \par            Updated A1c, B12, lipids in 4 months. \par            .\par            .\par            ==\par            .\par            August 19, 2015\par            .\par            PCP: Dr. Joe Teixeira \par             Card: Patricia Calle (Hx anginia, elev lipids \par             GI: Dr. Hayes (Hx anemia)\par             Ophthalmology: [facility] [place] good report\par             Pod: Dr. KARIME Callahan \par            .\par            CC: Diabetes since 2007 (, A1c 11.9 %) ***\par             (anemia) (L knee replaced - Dr. Lopez at [facility])\par             (sleep challenges)\par            .\par            Has an autographed copy of text by Bola Tim\par            Recent FBS 78 mg/dl\par            HbA1c 6.0%\par            .\par            Takes JanuMet  BID\par            .\par            Impression: Doing well. Notes some stomach rumbling so I will advise trial of decreasing the JanuMet to daily and repeating labs in about six months.\par            .\par            .\par            Plan: Same Rx and ROV six months. \par            .\par            ==\par            .\par            Feb 6, 2015\par            .\par            PCP: Dr. Joe Teixeira \par             Card: Patricia Calle (Hx anginia, elev lipids \par             GI: Dr. Hayes (Hx anemia)\par             Ophthalmology: [facility] \par             Pod: Dr. Callahan\par            .\par            CC: Diabetes since 2007 (, A1c 11.9 %)\par             (anemia) (L knee replaced - Dr. Lopez at [facility])\par             (Lyme disease)\par             (sleep challenges)\par            .\par            Now teaching genomics.\par            .\par            Lab Jan 2015\par            FBS 99, \par            Urine microalbumin normal\par            HDL 42\par            LDL 64\par            TSH 1.68 Hct 43.7\par            A1c 6.5%\par            .\par            He admits that he exercises a lot and eats properly.\par            .\par            Meds for DM: JanuMet  BID\par            Atovastin 10 mg\par            Amlodipine 5 mg\par            Hyzaar - HCTZ\par            ASA 81 mg/dl\par            Symbacort\par            .\par            Impression: Diabetes well controlled. \par            Tests fingerstick BS about once a week.\par            .\par            Plan: Same Rx and ROV 6 months.\par            .\par            .\par            .\par            .\par            ====\par            Dec 18, 2013\par            PCP: Dr. Teixeira\par            CC: DM since 2007\par            .\par            Summary of history below.\par            Going to [place] as a friend there is quite ill. \par            Says FBS may be 100 - 115. \par            FBS has drifted up a bit.\par            Will consider adding glipizide ER 2.5 mg in PM and\par            continue Janumet 50/500 BID for now.\par            ..\par            ===\par            May 17, 2013\par            PCP: Dr. Joe Teixeira Card: Patricia Calle GI: Dr. Hayes\par             [facility] Pod: Dr. Callahan\par            CC: Diabetes (anemia) (L knee replaced - Dr. Lopez at [facility])\par            Tick bite Nov 2012 - Doxy from acupuncturist. \par            .\par            DM Dx'd by Dr. Teixeira 2007 after he developed dizziness and BS over 600 with A1c of 11.9%.\par            Last note appended below. Has seen Dr. Calle - has angina, heart murmur. He is supposed to restart his walking, eliptical, etc. \par            .\par            Remains on Janumet 50/500 BID Recent A1c higher at 6.7%. Urine microalbumin within normal limits. Lipids not part of this panel.\par            .\par            .\par            Did not f/u to GI regarding anemia, but it resolved on Fe. \par            He feels that muscle pain may benefit from re-starting HGH. \par            .\par            .\par            +++++++++\par            # Hx angina - Dr. Calle - on Lipitor\par            # Recent anemia - colonoscopy by Dr. Hayes, declined upper endoscopy, Hct now back to normal\par            # Diabetes: on Janumet 50/500 BID. FBS in good range. A1c 6.1% Feels well. Recentlyu went to [place]. February 15, 2016\par            .\par            PCP: Dr. Joe Teixeira \par             Card: Patricia Calle (Hx anginia, elev lipids \par             GI: Dr. Hayes (Hx anemia)\par             [facility] [place] [facility]\par             Pod: Dr. KARIME Callahan \par            .\par            CC: Diabetes since 2007 (, A1c 11.9 %) ***\par             (anemia) \par             (L knee replaced - Dr. Lopez at [facility]) \par             (sleep challenges)\par             (ASHD)\par            .\par            Had recent evaluation for possible angina.\par            Studies turned out well.\par            Bystolic and isosorbide were prescribe.\par            Plans trip to [facility].\par            .\par            Eyes last checked a few months ago and was given a good report. \par            .\par            JanuMet  BID [facility] 60 days at a time\par            .\par            A1c 6.2 % (up from 6.0%)\par            .\par            Impression: Doing very well.\par            .\par            Plan: Restart fingerstick BS monitoring. \par            Updated A1c, B12, lipids in 4 months. \par            .\par            .\par            ==\par            .\par            August 19, 2015\par            .\par            PCP: Dr. Joe Teixeira \par             Card: Patricia Calle (Hx anginia, elev lipids \par             GI: Dr. Hayes (Hx anemia)\par             [facility] [place] [facility]\par             Pod: Dr. KARIME Callahan \par            .\par            CC: Diabetes since 2007 (, A1c 11.9 %) ***\par             (anemia) (L knee replaced - Dr. Lopez at [facility])\par             (sleep challenges)\par            .\par            Has an autographed copy of text by Bola Tim\par            Recent FBS 78 mg/dl\par            HbA1c 6.0%\par            .\par            Takes JanuMet  BID\par            .\par            Impression: Doing well. Notes some stomach rumbling so I will advise trial of decreasing the JanuMet to daily and repeating labs in about six months.\par            .\par            .\par            Plan: Same Rx and ROV six months. \par            .\par            ==\par            .\par            Feb 6, 2015\par            .\par            PCP: Dr. Joe Teixeira \par             Card: Patricia Calle (Hx anginia, elev lipids \par             GI: Dr. Hayes (Hx anemia)\par             [facility] \par             Pod: Dr. Callahan\par            .\par            CC: Diabetes since 2007 (, A1c 11.9 %)\par             (anemia) (L knee replaced - Dr. Lopez at [facility])\par             (Lyme disease)\par             (sleep challenges)\par            .\par            Now teaching genomics.\par            .\par            Lab Jan 2015\par            FBS 99, \par            Urine microalbumin normal\par            HDL 42\par            LDL 64\par            TSH 1.68 Hct 43.7\par            A1c 6.5%\par            .\par            He admits that he exercises a lot and eats properly.\par            .\par            Meds for DM: JanuMet  BID\par            Atovastin 10 mg\par            Amlodipine 5 mg\par            Hyzaar - HCTZ\par            ASA 81 mg/dl\par            Symbacort\par            .\par            Impression: Diabetes well controlled. \par            Tests fingerstick BS about once a week.\par            .\par            Plan: Same Rx and ROV 6 months.\par            .\par            .\par            .\par            .\par            ====\par            Dec 18, 2013\par            PCP: Dr. Teixeira\par            CC: DM since 2007\par            .\par            Summary of history below.\par            Going to [place] as a friend there is quite ill. \par            Says FBS may be 100 - 115. \par            FBS has drifted up a bit.\par            Will consider adding glipizide ER 2.5 mg in PM and\par            continue Janumet 50/500 BID for now.\par            ..\par            ===\par            May 17, 2013\par            PCP: Dr. Joe Teixeira Card: Patricia Calle GI: Dr. Hayes\par             Ophthalmology: [facility]: Dr. Callahan\par            CC: Diabetes (anemia) (L knee replaced - Dr. Lopez at [facility])\par            Tick bite Nov 2012 - Doxy from acupuncturist. \par            .\par            DM Dx'd by Dr. Teixeira 2007 after he developed dizziness and BS over 600 with A1c of 11.9%.\par            Last note appended below. Has seen Dr. Calle - has angina, heart murmur. He is supposed to restart his walking, eliptical, etc. \par            .\par            Remains on Janumet 50/500 BID Recent A1c higher at 6.7%. Urine microalbumin within normal limits. Lipids not part of this panel.\par            .\par            .\par            Did not f/u to GI regarding anemia, but it resolved on Fe. \par            He feels that muscle pain may benefit from re-starting HGH. \par            .\par            .\par            +++++++++\par            # Hx angina - Dr. Calle - on Lipitor\par            # Recent anemia - colonoscopy by Dr. Hayes, declined upper endoscopy, Hct now back to normal\par            # Diabetes: on Janumet 50/500 BID. FBS in good range. A1c 6.1% Feels well. Recentlyu went to [place]\par

## 2019-10-25 NOTE — PHYSICAL EXAM
[Alert] : alert [Well Nourished] : well nourished [No Acute Distress] : no acute distress [Healthy Appearance] : healthy appearance [Well Developed] : well developed [EOMI] : extra ocular movement intact [Normal Voice/Communication] : normal voice communication [No Proptosis] : no proptosis [Thyroid Not Enlarged] : the thyroid was not enlarged [No Respiratory Distress] : no respiratory distress [No Thyroid Nodules] : there were no palpable thyroid nodules [Normal Rate and Effort] : normal respiratory rhythm and effort [No Accessory Muscle Use] : no accessory muscle use [Normal Rate] : heart rate was normal  [Normal S1, S2] : normal S1 and S2 [Regular Rhythm] : with a regular rhythm [Pedal Pulses Normal] : the pedal pulses are present [Not Tender] : non-tender [Normal Bowel Sounds] : normal bowel sounds [No Edema] : there was no peripheral edema [Not Distended] : not distended [Soft] : abdomen soft [Spine Straight] : spine straight [No Stigmata of Cushings Syndrome] : no stigmata of cushings syndrome [Normal Gait] : normal gait [No Tremors] : no tremors [Normal Insight/Judgement] : insight and judgment were intact [Oriented x3] : oriented to person, place, and time [Normal Affect] : the affect was normal [Normal Mood] : the mood was normal [Acanthosis Nigricans] : no acanthosis nigricans

## 2019-10-26 LAB
ACTH SER-ACNC: 45.1 PG/ML
ANION GAP SERPL CALC-SCNC: 14 MMOL/L
BASOPHILS # BLD AUTO: 0.04 K/UL
BASOPHILS NFR BLD AUTO: 0.8 %
BUN SERPL-MCNC: 15 MG/DL
CALCIUM SERPL-MCNC: 9.7 MG/DL
CHLORIDE SERPL-SCNC: 105 MMOL/L
CO2 SERPL-SCNC: 26 MMOL/L
CORTIS SERPL-MCNC: 16 UG/DL
CREAT SERPL-MCNC: 1.12 MG/DL
EOSINOPHIL # BLD AUTO: 0.14 K/UL
EOSINOPHIL NFR BLD AUTO: 2.7 %
ESTIMATED AVERAGE GLUCOSE: 123 MG/DL
GLUCOSE SERPL-MCNC: 95 MG/DL
HBA1C MFR BLD HPLC: 5.9 %
HCT VFR BLD CALC: 42.3 %
HGB BLD-MCNC: 13 G/DL
IMM GRANULOCYTES NFR BLD AUTO: 0.4 %
LH SERPL-ACNC: 6.8 IU/L
LYMPHOCYTES # BLD AUTO: 1.52 K/UL
LYMPHOCYTES NFR BLD AUTO: 29.2 %
MAN DIFF?: NORMAL
MCHC RBC-ENTMCNC: 25.6 PG
MCHC RBC-ENTMCNC: 30.7 GM/DL
MCV RBC AUTO: 83.3 FL
MONOCYTES # BLD AUTO: 0.57 K/UL
MONOCYTES NFR BLD AUTO: 11 %
NEUTROPHILS # BLD AUTO: 2.91 K/UL
NEUTROPHILS NFR BLD AUTO: 55.9 %
PLATELET # BLD AUTO: 204 K/UL
POTASSIUM SERPL-SCNC: 3.8 MMOL/L
PROLACTIN SERPL-MCNC: 19.5 NG/ML
RBC # BLD: 5.08 M/UL
RBC # FLD: 13.7 %
SODIUM SERPL-SCNC: 145 MMOL/L
TESTOST SERPL-MCNC: 462 NG/DL
TSH SERPL-ACNC: 2.36 UIU/ML
VIT B12 SERPL-MCNC: 893 PG/ML
WBC # FLD AUTO: 5.2 K/UL

## 2019-11-02 LAB
GLIADIN IGA SER QL: <5 UNITS
GLIADIN IGG SER QL: <5 UNITS
GLIADIN PEPTIDE IGA SER-ACNC: NEGATIVE
GLIADIN PEPTIDE IGG SER-ACNC: NEGATIVE

## 2020-02-24 ENCOUNTER — APPOINTMENT (OUTPATIENT)
Dept: ENDOCRINOLOGY | Facility: CLINIC | Age: 75
End: 2020-02-24
Payer: COMMERCIAL

## 2020-02-24 VITALS
BODY MASS INDEX: 21.19 KG/M2 | SYSTOLIC BLOOD PRESSURE: 120 MMHG | HEIGHT: 70 IN | WEIGHT: 148 LBS | OXYGEN SATURATION: 94 % | HEART RATE: 50 BPM | DIASTOLIC BLOOD PRESSURE: 70 MMHG

## 2020-02-24 PROCEDURE — 99214 OFFICE O/P EST MOD 30 MIN: CPT

## 2020-02-27 NOTE — PHYSICAL EXAM
[Alert] : alert [No Acute Distress] : no acute distress [Well Nourished] : well nourished [Well Developed] : well developed [Healthy Appearance] : healthy appearance [Normal Voice/Communication] : normal voice communication [EOMI] : extra ocular movement intact [No Proptosis] : no proptosis [Thyroid Not Enlarged] : the thyroid was not enlarged [No Thyroid Nodules] : there were no palpable thyroid nodules [No Respiratory Distress] : no respiratory distress [Normal Rate and Effort] : normal respiratory rhythm and effort [No Accessory Muscle Use] : no accessory muscle use [Normal Rate] : heart rate was normal  [Normal S1, S2] : normal S1 and S2 [Regular Rhythm] : with a regular rhythm [Pedal Pulses Normal] : the pedal pulses are present [No Edema] : there was no peripheral edema [Normal Bowel Sounds] : normal bowel sounds [Not Tender] : non-tender [Soft] : abdomen soft [Not Distended] : not distended [Spine Straight] : spine straight [No Stigmata of Cushings Syndrome] : no stigmata of cushings syndrome [Normal Gait] : normal gait [Acanthosis Nigricans] : no acanthosis nigricans [No Tremors] : no tremors [Oriented x3] : oriented to person, place, and time [Normal Insight/Judgement] : insight and judgment were intact [Normal Affect] : the affect was normal [Normal Mood] : the mood was normal

## 2020-02-27 NOTE — HISTORY OF PRESENT ILLNESS
[FreeTextEntry1] : Feb 24, 2020\par \par PCP: Dr. Joe Teixeira \par             Card: Patricia Calle (Hx anginia, elev lipids \par             GI: Dr. Hayes (Hx anemia) - colonoscopy last week - OK\par             Ophthalmology: Santiago Valles p [phone] f [phone]\par             Pod: Dr. KARIME Callahan - available\par             Ortho - Dr. Dee at [facility] TKR\par            .\par            CC: Diabetes since 2007 (, A1c 11.9 %) ***    3/2019  5.9;  10/2019  5.9\par             (anemia) \par             (L knee replaced - Dr. Lopez at [facility]) \par             (sleep challenges)\par             (ASHD)\par             ( 6/2018: back pain: scoliosis)\par             (6/2018: hepatic cyst)\par \par Remains on JanuMet  BID\par Feels well.\par A1c in March and Oct:  5.9 %\par \par Impression:  Doing well.\par \par Plan:  Updated A1c, BMP.\par See GI to follow up on hepatic cyst\par \par \par \par \par Oct 25, 2019\par \par PCP: Dr. Joe Teixeira \par             Card: Patricia Calle (Hx anginia, elev lipids \par             GI: Dr. Hayes (Hx anemia) - colonoscopy last week - OK\par             Ophthalmology: Santiago Valles p [phone] f [phone]\par             Pod: Dr. KARIME Callahan - available\par             Ortho - Dr. Dee at [facility] TKR\par            .\par            CC: Diabetes since 2007 (, A1c 11.9 %) ***\par             (anemia) \par             (L knee replaced - Dr. Lopez at [facility]) \par             (sleep challenges)\par             (ASHD)\par             ( 6/2018: back pain: scoliosis)\par             (6/2018: hepatic cyst)\par \par Visits for diabetes for which he takes JanuMet  BID.\par NR\par Last A1c in March 2019 was 5.9 %\par \par Impression:  By history, doing well.  Last saw ophthalmology during the summer.\par \par Plan:  Updated A1c today.\par ROV in March or April with a few records.  \par \par \par \par \par March 18, 2019\par \par  PCP: Dr. Joe Teixeira \par             Card: Patricia Calle (Hx anginia, elev lipids \par             GI: Dr. Hayes (Hx anemia) - colonoscopy last week - OK\par             Ophthalmology: Santiago Orloff p  f [phone]\par             Pod: Dr. KARIME Callahan - available\par             Ortho - Dr. Dee at [facility] TKR\par            .\par            CC: Diabetes since 2007 (, A1c 11.9 %) ***\par             (anemia) \par             (L knee replaced - Dr. Lopez at [facility]) \par             (sleep challenges)\par             (ASHD)\par             ( 6/2018: back pain: scoliosis)\par             (6/2018: hepatic cyst)\par \par At [place] on\par 3/5/2019\par A1c 5.9\par B12 596\par ferritin 30 ()\par WBC 4.18\par Hct 41.3\par serum iron 103\par \par \par \par \par \par Previous notes from [employer] appended below.\par \par    Sept 17, 2018\par            .\par            PCP: Dr. Joe Teixeira \par             Card: Patricia Calle (Hx anginia, elev lipids \par             GI: Dr. Hayes (Hx anemia) - colonoscopy last week - OK\par             Ophthalmology: Santiago Orloff p  f [phone]\par             Pod: Dr. KARIME Callahan - available\par             Ortho - Dr. Dee at [facility] TKR\par            .\par            CC: Diabetes since 2007 (, A1c 11.9 %) ***\par             (anemia) \par             (L knee replaced - Dr. oLpez at [facility]) \par             (sleep challenges)\par             (ASHD)\par             ( 6/2018: back pain: scoliosis)\par             (6/2018: hepatic cyst)\par            .\par            5/16/2018 X-ray R hip: mild bilateral hip arthopathy....arterial vasc. calcifications, \par            .\par            6/20/2018 ER visit for back pain\par            -incidental . hepatic cyist detected\par            .\par            9/7/2018 [facility] included\par            microalbumin - 5\par            Hct 40.3\par            MCV 82.4 \par            A1c 5.8 % \par             m/gdl\par            creatinine 1.12\par            calcium 9.7 *****\par            uric acid 5.3 \par            GGT 24\par            \par            ESR 2\par            PTH 40\par            RF neg \par            immunofix: neg \par            Lyme - neg\par            25 OH vit D 45\par            PSA 2.1 \par            1,25 di OH vit D 47 (18-72)\par            ionized calcium normal\par            .\par            Impression: He is on ferrous gluconate for anemia and he would like to know his ferritin and Fe/TIBC.\par            .\par            Eye exam was excellent. .\par            .\par            ==\par            .\par            May 16, 2018\par            .\par            PCP: Dr. Joe Teixeira \par             Card: Patricia Calle (Hx anginia, elev lipids \par             GI: Dr. Hayes (Hx anemia) - colonoscopy last week - OK\par             Ophthalmology: Santiago Valles p  f [phone]\par             Pod: Dr. KARIME Callahan - available\par             Ortho - Dr. Dee at [facility] TK\par            .\par            CC: Diabetes since 2007 (, A1c 11.9 %) ***\par             (anemia) \par             (L knee replaced - Dr. 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 \par            .\par            Impression: Feeling well.\par            Slightly elevated [facility] calcium. \par            A1c in good range.\par            .\par            Plan: Same Rx. \par            .\par            ==\par            .\par            January 16, 2018\par            .\par            PCP: Dr. Joe Teixeira \par             Card: Patricia Calle (Hx anginia, elev lipids \par             GI: Dr. Hayes (Hx anemia) - colonoscopy last week - OK\par             Ophthalmology: Santaigo Valles p  f [phone]\par             Pod: Dr. KARIME Callahan - available\par             Ortho - Dr. Dee at [facility] TKR\par            .\par            CC: Diabetes since 2007 (, A1c 11.9 %) ***\par             (anemia) \par             (L knee replaced - Dr. Lopez at [facility]) \par             (sleep challenges)\par             (ASHD)\par            .\par            Reports he saw ophthalmology a few months ago.\par            Currently has cough - saw Dr. Teixeira.\par            Has funny feeling in toes. \par            .\par            Impresion: He appears to be doing great !\par            .\par            ==\par            .\par            August 8, 2017\par            .\par            PCP: Dr. Joe Teixeira \par             Card: Patricia Calle (Hx anginia, elev lipids \par             GI: Dr. Hayes (Hx anemia) - colonoscopy last week - OK\par             Ophthalmology: Santiago Valles [facility] tomorrow\par             Pod: Dr. KARIME Callahan - available\par             Ortho - Dr. Dee at [facility] TKR\par            .\par            CC: Diabetes since 2007 (, A1c 11.9 %) ***\par             (anemia) \par             (L knee replaced - Dr. Lopez at [facility]) \par             (sleep challenges)\par             (ASHD)\par            .\par            Had colonoscopy about a year ago by Dr. Hayes.\par            Declined upper endoscopy.\par            Took ferrous gluconate.\par            Recent labs at [facility] on\par            7/25/2017 included\par            BS 98\par            TSH 2.29 \par            Hct 42.7\par            MCV 79.7\par            A1c 5.9\par            .\par            .\par            Impression: Diabetes under excellent control based on his own fingerstick sugars as well as recent A1c of 5.9%\par            .\par            Plan: He will see Dr. Teixeira very soon.\par            ROV in about six months. Thank you. -\par            .\par            ==\par            .\par            Feb 8, 2017\par            .\par            PCP: Dr. Joe Teixeira \par             Card: Patricia Calle (Hx anginia, elev lipids \par             GI: Dr. Hayes (Hx anemia) - colonoscopy last week - OK\par             Ophthalmology: [facility] tomorrow\par             Pod: Dr. KARIME Callahan - available\par             Ortho - Dr. Dee at [facility] TKR\par            .\par            CC: Diabetes since 2007 (, A1c 11.9 %) ***\par             (anemia) \par             (L knee replaced - Dr. Lopez at [facility]) \par             (sleep challenges)\par             (ASHD)\par            .\par            On Bystolic and\par            JanuMet  BID for diabetes.\par            .\par            Recent [facility] A1c 6.2 %\par            urine microalbu 0.6 \par            .\par            Impression: Doing very well.\par            .\par            Plan: Reviewed fingerstick BS and guidelines.\par            Need for annual eye exam. \par            .\par            ==\par            .\par            August 8, 2016\par            .\par            PCP: Dr. Joe Teixeira \par             Card: Patricia Calle (Hx anginia, elev lipids \par             GI: Dr. Hayes (Hx anemia) - colonoscopy last week - OK\par             Ophthalmology: [facility] tomorrow\par             Pod: Dr. KARIME Callahan - available\par             Ortho - Dr. Dee at [facility] TKR\par            .\par            CC: Diabetes since 2007 (, A1c 11.9 %) ***\par             (anemia) \par             (L knee replaced - Dr. Lopez at [facility]) \par             (sleep challenges)\par             (ASHD)\par            .\par            Recent labs ([facility]) show\par             mg/dl\par            B12 461 \par            HbA1c 6.3 %\par            .\par            Impression: Doing excellent job of controlling sugars.\par            .\par            Plan: Same Rx: JanuMet, diet, exercise. \par            .\par            ==\par            .\par            February 15, 2016\par            .\par            PCP: Dr. Joe Teixeira \par             Card: Patricia Calle (Hx anginia, elev lipids \par             GI: Dr. Hayes (Hx anemia)\par             Ophthalmology: Santiago Orloff [place] good report\par             Pod: Dr. KARIME Callahan \par            .\par            CC: Diabetes since 2007 (, A1c 11.9 %) ***\par             (anemia) \par             (L knee replaced - Dr. Lopez at [facility]) \par             (sleep challenges)\par             (ASHD)\par            .\par            Had recent evaluation for possible angina.\par            Studies turned out well.\par            Bystolic and isosorbide were prescribe.\par            Plans trip to [facility].\par            .\par            Eyes last checked a few months ago and was given a good report. \par            .\par            JanuMet  BID [facility] [place] 60 days at a time\par            .\par            A1c 6.2 % (up from 6.0%)\par            .\par            Impression: Doing very well.\par            .\par            Plan: Restart fingerstick BS monitoring. \par            Updated A1c, B12, lipids in 4 months. \par            .\par            .\par            ==\par            .\par            August 19, 2015\par            .\par            PCP: Dr. Joe Teixeira \par             Card: Patricia Calle (Hx anginia, elev lipids \par             GI: Dr. Hayes (Hx anemia)\par             Ophthalmology: Santiago Orloff [place] good report\par             Pod: Dr. KARIME Callahan \par            .\par            CC: Diabetes since 2007 (, A1c 11.9 %) ***\par             (anemia) (L knee replaced - Dr. Lopez at [facility])\par             (sleep challenges)\par            .\par            Has an autographed copy of text by Bola Tim\par            Recent FBS 78 mg/dl\par            HbA1c 6.0%\par            .\par            Takes JanuMet  BID\par            .\par            Impression: Doing well. Notes some stomach rumbling so I will advise trial of decreasing the JanuMet to daily and repeating labs in about six months.\par            .\par            .\par            Plan: Same Rx and ROV six months. \par            .\par            ==\par            .\par            Feb 6, 2015\par            .\par            PCP: Dr. Joe Teixeira \par             Card: Patricia Calle (Hx anginia, elev lipids \par             GI: Dr. Hayes (Hx anemia)\par             Ophthalmology: Santiago Solaresvaldemar [facility] \par             Pod: Dr. Callahan\par            .\par            CC: Diabetes since 2007 (, A1c 11.9 %)\par             (anemia) (L knee replaced - Dr. Lopez at [facility])\par             (Lyme disease)\par             (sleep challenges)\par            .\par            Now teaching genomics.\par            .\par            Lab Jan 2015\par            FBS 99, \par            Urine microalbumin normal\par            HDL 42\par            LDL 64\par            TSH 1.68 Hct 43.7\par            A1c 6.5%\par            .\par            He admits that he exercises a lot and eats properly.\par            .\par            Meds for DM: JanuMet  BID\par            Atovastin 10 mg\par            Amlodipine 5 mg\par            Hyzaar - HCTZ\par            ASA 81 mg/dl\par            Symbacort\par            .\par            Impression: Diabetes well controlled. \par            Tests fingerstick BS about once a week.\par            .\par            Plan: Same Rx and ROV 6 months.\par            .\par            .\par            .\par            .\par            ====\par            Dec 18, 2013\par            PCP: Dr. Teixeira\par            CC: DM since 2007\par            .\par            Summary of history below.\par            Going to [place] as a friend there is quite ill. \par            Says FBS may be 100 - 115. \par            FBS has drifted up a bit.\par            Will consider adding glipizide ER 2.5 mg in PM and\par            continue Janumet 50/500 BID for now.\par            ..\par            ===\par            May 17, 2013\par            PCP: Dr. Joe Teixeira Card: Patricia Calle GI: Dr. Hayes\par             Ophthalmology: Santiago Valles [facility] Pod: Dr. Callahan\par            CC: Diabetes (anemia) (L knee replaced - Dr. Lopez at [facility])\par            Tick bite Nov 2012 - Doxy from acupuncturist. \par            .\par            DM Dx'd by Dr. Teixeira 2007 after he developed dizziness and BS over 600 with A1c of 11.9%.\par            Last note appended below. Has seen Dr. Calle - has angina, heart murmur. He is supposed to restart his walking, eliptical, etc. \par            .\par            Remains on Janumet 50/500 BID Recent A1c higher at 6.7%. Urine microalbumin within normal limits. Lipids not part of this panel.\par            .\par            .\par            Did not f/u to GI regarding anemia, but it resolved on Fe. \par            He feels that muscle pain may benefit from re-starting HGH. \par            .\par            .\par            +++++++++\par            # Hx angina - Dr. Calle - on Lipitor\par            # Recent anemia - colonoscopy by Dr. Hayes, declined upper endoscopy, Hct now back to normal\par            # Diabetes: on Janumet 50/500 BID. FBS in good range. A1c 6.1% Feels well. Recentlyu went to [place]. February 15, 2016\par            .\par            PCP: Dr. Joe Teixeira \par             Card: Patricia Calle (Hx anginia, elev lipids \par             GI: Dr. Hayes (Hx anemia)\par             Ophthalmology: Santiago Orloff [place] good report\par             Pod: Dr. KARIME Callahan \par            .\par            CC: Diabetes since 2007 (, A1c 11.9 %) ***\par             (anemia) \par             (L knee replaced - Dr. Lopez at [facility]) \par             (sleep challenges)\par             (ASHD)\par            .\par            Had recent evaluation for possible angina.\par            Studies turned out well.\par            Bystolic and isosorbide were prescribe.\par            Plans trip to [facility].\par            .\par            Eyes last checked a few months ago and was given a good report. \par            .\par            JanuMet  BID [facility] [place] 60 days at a time\par            .\par            A1c 6.2 % (up from 6.0%)\par            .\par            Impression: Doing very well.\par            .\par            Plan: Restart fingerstick BS monitoring. \par            Updated A1c, B12, lipids in 4 months. \par            .\par            .\par            ==\par            .\par            August 19, 2015\par            .\par            PCP: Dr. Joe Teixeira \par             Card: Patricia Calle (Hx anginia, elev lipids \par             GI: Dr. Hayes (Hx anemia)\par             Ophthalmology: Santiago Orloff [place] good report\par             Pod: Dr. KARIME Callahan \par            .\par            CC: Diabetes since 2007 (, A1c 11.9 %) ***\par             (anemia) (L knee replaced - Dr. Lopez at [facility])\par             (sleep challenges)\par            .\par            Has an autographed copy of text by Bola Tim\par            Recent FBS 78 mg/dl\par            HbA1c 6.0%\par            .\par            Takes JanuMet  BID\par            .\par            Impression: Doing well. Notes some stomach rumbling so I will advise trial of decreasing the JanuMet to daily and repeating labs in about six months.\par            .\par            .\par            Plan: Same Rx and ROV six months. \par            .\par            ==\par            .\par            Feb 6, 2015\par            .\par            PCP: Dr. Joe Teixeira \par             Card: Patricia Calle (Hx anginia, elev lipids \par             GI: Dr. Hayes (Hx anemia)\par             Ophthalmology: Santiago Valles [facility] \par             Pod: Dr. Callahan\par            .\par            CC: Diabetes since 2007 (, A1c 11.9 %)\par             (anemia) (L knee replaced - Dr. Lopez at [facility])\par             (Lyme disease)\par             (sleep challenges)\par            .\par            Now teaching genomics.\par            .\par            Lab Jan 2015\par            FBS 99, \par            Urine microalbumin normal\par            HDL 42\par            LDL 64\par            TSH 1.68 Hct 43.7\par            A1c 6.5%\par            .\par            He admits that he exercises a lot and eats properly.\par            .\par            Meds for DM: JanuMet  BID\par            Atovastin 10 mg\par            Amlodipine 5 mg\par            Hyzaar - HCTZ\par            ASA 81 mg/dl\par            Symbacort\par            .\par            Impression: Diabetes well controlled. \par            Tests fingerstick BS about once a week.\par            .\par            Plan: Same Rx and ROV 6 months.\par            .\par            .\par            .\par            .\par            ====\par            Dec 18, 2013\par            PCP: Dr. Teixeira\par            CC: DM since 2007\par            .\par            Summary of history below.\par            Going to [place] as a friend there is quite ill. \par            Says FBS may be 100 - 115. \par            FBS has drifted up a bit.\par            Will consider adding glipizide ER 2.5 mg in PM and\par            continue Janumet 50/500 BID for now.\par            ..\par            ===\par            May 17, 2013\par            PCP: Dr. Joe Teixeira Card: Patricia Calle GI: Dr. Hayes\par             Ophthalmology: [facility] Pod: Dr. Callahan\par            CC: Diabetes (anemia) (L knee replaced - Dr. Lopez at [facility])\par            Tick bite Nov 2012 - Doxy from acupuncturist. \par            .\par            DM Dx'd by Dr. Teixeira 2007 after he developed dizziness and BS over 600 with A1c of 11.9%.\par            Last note appended below. Has seen Dr. Calle - has angina, heart murmur. He is supposed to restart his walking, eliptical, etc. \par            .\par            Remains on Janumet 50/500 BID Recent A1c higher at 6.7%. Urine microalbumin within normal limits. Lipids not part of this panel.\par            .\par            .\par            Did not f/u to GI regarding anemia, but it resolved on Fe. \par            He feels that muscle pain may benefit from re-starting HGH. \par            .\par            .\par            +++++++++\par            # Hx angina - Dr. Calle - on Lipitor\par            # Recent anemia - colonoscopy by Dr. Hayes, declined upper endoscopy, Hct now back to normal\par            # Diabetes: on Janumet 50/500 BID. FBS in good range. A1c 6.1% Feels well. Recentlyu went to [place]. February 15, 2016\par            .\par            PCP: Dr. Joe Teixeira \par             Card: Patricia Calle (Hx anginia, elev lipids \par             GI: Dr. Hayes (Hx anemia)\par             Ophthalmology: Santiago Orloff [place] good report\par             Pod: Dr. KARIME Callahan \par            .\par            CC: Diabetes since 2007 (, A1c 11.9 %) ***\par             (anemia) \par             (L knee replaced - Dr. Lopez at [facility]) \par             (sleep challenges)\par             (ASHD)\par            .\par            Had recent evaluation for possible angina.\par            Studies turned out well.\par            Bystolic and isosorbide were prescribe.\par            Plans trip to [facility].\par            .\par            Eyes last checked a few months ago and was given a good report. \par            .\par            JanuMet  BID [facility] 60 days at a time\par            .\par            A1c 6.2 % (up from 6.0%)\par            .\par            Impression: Doing very well.\par            .\par            Plan: Restart fingerstick BS monitoring. \par            Updated A1c, B12, lipids in 4 months. \par            .\par            .\par            ==\par            .\par            August 19, 2015\par            .\par            PCP: Dr. Joe Teixeira \par             Card: Patricia Calle (Hx anginia, elev lipids \par             GI: Dr. Hayes (Hx anemia)\par             Ophthalmology: Santiago Orloff [place] good report\par             Pod: Dr. KARIME Callahan \par            .\par            CC: Diabetes since 2007 (, A1c 11.9 %) ***\par             (anemia) (L knee replaced - Dr. Lopez at [facility])\par             (sleep challenges)\par            .\par            Has an autographed copy of text by Bola Tim\par            Recent FBS 78 mg/dl\par            HbA1c 6.0%\par            .\par            Takes JanuMet  BID\par            .\par            Impression: Doing well. Notes some stomach rumbling so I will advise trial of decreasing the JanuMet to daily and repeating labs in about six months.\par            .\par            .\par            Plan: Same Rx and ROV six months. \par            .\par            ==\par            .\par            Feb 6, 2015\par            .\par            PCP: Dr. Joe Teixeira \par             Card: Patricia Calle (Hx anginia, elev lipids \par             GI: Dr. Hayes (Hx anemia)\par             Ophthalmology: Santiago Valles [facility] \par             Pod: Dr. Callahan\par            .\par            CC: Diabetes since 2007 (, A1c 11.9 %)\par             (anemia) (L knee replaced - Dr. Lopez at [facility])\par             (Lyme disease)\par             (sleep challenges)\par            .\par            Now teaching genomics.\par            .\par            Lab Jan 2015\par            FBS 99, \par            Urine microalbumin normal\par            HDL 42\par            LDL 64\par            TSH 1.68 Hct 43.7\par            A1c 6.5%\par            .\par            He admits that he exercises a lot and eats properly.\par            .\par            Meds for DM: JanuMet  BID\par            Atovastin 10 mg\par            Amlodipine 5 mg\par            Hyzaar - HCTZ\par            ASA 81 mg/dl\par            Symbacort\par            .\par            Impression: Diabetes well controlled. \par            Tests fingerstick BS about once a week.\par            .\par            Plan: Same Rx and ROV 6 months.\par            .\par            .\par            .\par            .\par            ====\par            Dec 18, 2013\par            PCP: Dr. Teixeira\par            CC: DM since 2007\par            .\par            Summary of history below.\par            Going to [place] as a friend there is quite ill. \par            Says FBS may be 100 - 115. \par            FBS has drifted up a bit.\par            Will consider adding glipizide ER 2.5 mg in PM and\par            continue Janumet 50/500 BID for now.\par            ..\par            ===\par            May 17, 2013\par            PCP: Dr. Joe Teixeira Card: Patricia Calle GI: Dr. Hayes\par             Ophthalmology: Santiago Valles [facility] Pod: Dr. Callahan\par            CC: Diabetes (anemia) (L knee replaced - Dr. Lopez at [facility])\par            Tick bite Nov 2012 - Doxy from acupuncturist. \par            .\par            DM Dx'd by Dr. Teixeira 2007 after he developed dizziness and BS over 600 with A1c of 11.9%.\par            Last note appended below. Has seen Dr. Calle - has angina, heart murmur. He is supposed to restart his walking, eliptical, etc. \par            .\par            Remains on Janumet 50/500 BID Recent A1c higher at 6.7%. Urine microalbumin within normal limits. Lipids not part of this panel.\par            .\par            .\par            Did not f/u to GI regarding anemia, but it resolved on Fe. \par            He feels that muscle pain may benefit from re-starting HGH. \par            .\par            .\par            +++++++++\par            # Hx angina - Dr. Calle - on Lipitor\par            # Recent anemia - colonoscopy by Dr. Hayes, declined upper endoscopy, Hct now back to normal\par            # Diabetes: on Janumet 50/500 BID. FBS in good range. A1c 6.1% Feels well. Recentlyu went to [place]\par

## 2020-03-01 LAB
ANION GAP SERPL CALC-SCNC: 14 MMOL/L
BASOPHILS # BLD AUTO: 0.03 K/UL
BASOPHILS NFR BLD AUTO: 0.6 %
BUN SERPL-MCNC: 19 MG/DL
CALCIUM SERPL-MCNC: 9.5 MG/DL
CHLORIDE SERPL-SCNC: 102 MMOL/L
CO2 SERPL-SCNC: 27 MMOL/L
CREAT SERPL-MCNC: 1.16 MG/DL
EOSINOPHIL # BLD AUTO: 0.16 K/UL
EOSINOPHIL NFR BLD AUTO: 3.1 %
ESTIMATED AVERAGE GLUCOSE: 123 MG/DL
FERRITIN SERPL-MCNC: 27 NG/ML
GLUCOSE SERPL-MCNC: 99 MG/DL
HBA1C MFR BLD HPLC: 5.9 %
HCT VFR BLD CALC: 43.9 %
HGB BLD-MCNC: 13.3 G/DL
IMM GRANULOCYTES NFR BLD AUTO: 0.2 %
IRON SATN MFR SERPL: 33 %
IRON SERPL-MCNC: 109 UG/DL
LYMPHOCYTES # BLD AUTO: 1.58 K/UL
LYMPHOCYTES NFR BLD AUTO: 30.5 %
MAN DIFF?: NORMAL
MCHC RBC-ENTMCNC: 25.8 PG
MCHC RBC-ENTMCNC: 30.3 GM/DL
MCV RBC AUTO: 85.1 FL
MONOCYTES # BLD AUTO: 0.57 K/UL
MONOCYTES NFR BLD AUTO: 11 %
NEUTROPHILS # BLD AUTO: 2.83 K/UL
NEUTROPHILS NFR BLD AUTO: 54.6 %
PLATELET # BLD AUTO: 168 K/UL
POTASSIUM SERPL-SCNC: 3.8 MMOL/L
RBC # BLD: 5.16 M/UL
RBC # FLD: 14.5 %
SODIUM SERPL-SCNC: 143 MMOL/L
TIBC SERPL-MCNC: 335 UG/DL
UIBC SERPL-MCNC: 226 UG/DL
WBC # FLD AUTO: 5.18 K/UL

## 2020-04-16 ENCOUNTER — APPOINTMENT (OUTPATIENT)
Dept: CARDIOLOGY | Facility: CLINIC | Age: 75
End: 2020-04-16
Payer: COMMERCIAL

## 2020-04-16 DIAGNOSIS — E53.1 PYRIDOXINE DEFICIENCY: ICD-10-CM

## 2020-04-16 PROCEDURE — 99214 OFFICE O/P EST MOD 30 MIN: CPT | Mod: 95

## 2020-04-16 NOTE — HISTORY OF PRESENT ILLNESS
[Home] : at home, [unfilled] , at the time of the visit. [Other Location: e.g. 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 He denies chest pain, dyspnea, palpitations or syncope. He exercises daily without difficulty. ([place] in [place]), continues to work teaching on line.\par He had an er visit for asthma  in May  2019 while visiting a friend with a cat at [facility], he was kept overnight followed up with Dr Teixeira.No further events.\par He denies chest pain, dyspnea, palpitations or syncope.\par

## 2020-04-21 ENCOUNTER — APPOINTMENT (OUTPATIENT)
Dept: CARDIOLOGY | Facility: CLINIC | Age: 75
End: 2020-04-21

## 2020-07-20 ENCOUNTER — APPOINTMENT (OUTPATIENT)
Dept: CARDIOLOGY | Facility: CLINIC | Age: 75
End: 2020-07-20
Payer: COMMERCIAL

## 2020-07-20 VITALS
BODY MASS INDEX: 19.33 KG/M2 | HEART RATE: 49 BPM | WEIGHT: 135 LBS | SYSTOLIC BLOOD PRESSURE: 120 MMHG | HEIGHT: 70 IN | DIASTOLIC BLOOD PRESSURE: 70 MMHG

## 2020-07-20 PROCEDURE — 93351 STRESS TTE COMPLETE: CPT

## 2020-07-20 PROCEDURE — 99212 OFFICE O/P EST SF 10 MIN: CPT

## 2020-07-20 RX ORDER — TADALAFIL 20 MG/1
20 TABLET ORAL
Qty: 20 | Refills: 1 | Status: DISCONTINUED | COMMUNITY
Start: 2019-06-07 | End: 2020-07-20

## 2020-07-20 RX ORDER — NEBIVOLOL HYDROCHLORIDE 5 MG/1
5 TABLET ORAL DAILY
Qty: 90 | Refills: 3 | Status: DISCONTINUED | COMMUNITY
Start: 2018-06-22 | End: 2020-07-20

## 2020-07-20 RX ORDER — AMLODIPINE BESYLATE 5 MG/1
5 TABLET ORAL DAILY
Qty: 90 | Refills: 3 | Status: DISCONTINUED | COMMUNITY
Start: 2018-08-27 | End: 2020-07-20

## 2020-07-20 NOTE — PHYSICAL EXAM
[Eyelids - No Xanthelasma] : the eyelids demonstrated no xanthelasmas [FreeTextEntry1] : PT APPEARS EXTREMELY THIN. [Normal Conjunctiva] : the conjunctiva exhibited no abnormalities [Normal Oral Mucosa] : normal oral mucosa [No Oral Pallor] : no oral pallor [No Oral Cyanosis] : no oral cyanosis [Normal Jugular Venous A Waves Present] : normal jugular venous A waves present [No Jugular Venous Mayers A Waves] : no jugular venous mayers A waves [Normal Jugular Venous V Waves Present] : normal jugular venous V waves present [Respiration, Rhythm And Depth] : normal respiratory rhythm and effort [Exaggerated Use Of Accessory Muscles For Inspiration] : no accessory muscle use [Auscultation Breath Sounds / Voice Sounds] : lungs were clear to auscultation bilaterally [Heart Rate And Rhythm] : heart rate and rhythm were normal [Heart Sounds] : normal S1 and S2 [Abdomen Soft] : soft [Murmurs] : no murmurs present [Abdomen Tenderness] : non-tender [Gait - Sufficient For Exercise Testing] : the gait was sufficient for exercise testing [Abdomen Mass (___ Cm)] : no abdominal mass palpated [Abnormal Walk] : normal gait [Nail Clubbing] : no clubbing of the fingernails [Cyanosis, Localized] : no localized cyanosis [Petechial Hemorrhages (___cm)] : no petechial hemorrhages [Skin Color & Pigmentation] : normal skin color and pigmentation [] : no rash [No Venous Stasis] : no venous stasis [Skin Lesions] : no skin lesions [No Skin Ulcers] : no skin ulcer [No Xanthoma] : no  xanthoma was observed [Oriented To Time, Place, And Person] : oriented to person, place, and time [Affect] : the affect was normal [Mood] : the mood was normal [No Anxiety] : not feeling anxious

## 2020-07-20 NOTE — HISTORY OF PRESENT ILLNESS
[FreeTextEntry1] : The patient has had unexplained weight loss and is now extremely sent. He has lost about 10 pounds over the past 6 months. Appetite has been normal.

## 2020-07-20 NOTE — DISCUSSION/SUMMARY
[FreeTextEntry1] : Today's stress echo reveals no evidence of exercise-induced myocardial ischemia or arrhythmias. The patient was able to finish Madi stage II of a Madi protocol without difficulty. Left ventricular systolic function is normal the estimated resting ejection fraction is 70%. Based on today's evaluation I find the patient's cardiovascular status to be stable. I have asked him to notify his primary care physician about his weight loss so that this can be further evaluated.

## 2020-07-20 NOTE — REASON FOR VISIT
[FreeTextEntry1] : The patient comes today for followup and stress echocardiography. He is followed with a diagnosis of chronic coronary artery disease which has been stable. The patient has had no recent cardiac symptoms. He has had no recent symptoms of chest pain unusual shortness of breath or palpitations. He is trying to keep active with walking and exercise. There have been some restrictions due to the covid pandemic.

## 2020-07-28 ENCOUNTER — APPOINTMENT (OUTPATIENT)
Dept: INTERNAL MEDICINE | Facility: CLINIC | Age: 75
End: 2020-07-28
Payer: COMMERCIAL

## 2020-07-28 VITALS
OXYGEN SATURATION: 98 % | HEIGHT: 70 IN | HEART RATE: 51 BPM | DIASTOLIC BLOOD PRESSURE: 80 MMHG | WEIGHT: 136 LBS | SYSTOLIC BLOOD PRESSURE: 140 MMHG | BODY MASS INDEX: 19.47 KG/M2

## 2020-07-28 PROCEDURE — 36415 COLL VENOUS BLD VENIPUNCTURE: CPT

## 2020-07-28 PROCEDURE — 99397 PER PM REEVAL EST PAT 65+ YR: CPT | Mod: 25

## 2020-07-28 NOTE — ASSESSMENT
[FreeTextEntry1] : Patient with 10-12 pound weight loss over the past year. I feel this patient does require a CAT scan chest abdomen and pelvis for evaluation of weight loss. We'll check routine labs. Medications remain unchanged.

## 2020-07-28 NOTE — PHYSICAL EXAM
[No Acute Distress] : no acute distress [Well Nourished] : well nourished [Well Developed] : well developed [Well-Appearing] : well-appearing [Normal Sclera/Conjunctiva] : normal sclera/conjunctiva [PERRL] : pupils equal round and reactive to light [EOMI] : extraocular movements intact [Normal Outer Ear/Nose] : the outer ears and nose were normal in appearance [Normal Oropharynx] : the oropharynx was normal [No JVD] : no jugular venous distention [No Lymphadenopathy] : no lymphadenopathy [Supple] : supple [Thyroid Normal, No Nodules] : the thyroid was normal and there were no nodules present [No Respiratory Distress] : no respiratory distress  [No Accessory Muscle Use] : no accessory muscle use [Clear to Auscultation] : lungs were clear to auscultation bilaterally [Normal Rate] : normal rate  [Normal S1, S2] : normal S1 and S2 [Regular Rhythm] : with a regular rhythm [No Murmur] : no murmur heard [No Carotid Bruits] : no carotid bruits [No Abdominal Bruit] : a ~M bruit was not heard ~T in the abdomen [No Varicosities] : no varicosities [Pedal Pulses Present] : the pedal pulses are present [No Edema] : there was no peripheral edema [No Palpable Aorta] : no palpable aorta [No Extremity Clubbing/Cyanosis] : no extremity clubbing/cyanosis [Soft] : abdomen soft [Non Tender] : non-tender [Non-distended] : non-distended [No HSM] : no HSM [No Masses] : no abdominal mass palpated [Normal Bowel Sounds] : normal bowel sounds [Normal Posterior Cervical Nodes] : no posterior cervical lymphadenopathy [No CVA Tenderness] : no CVA  tenderness [Normal Anterior Cervical Nodes] : no anterior cervical lymphadenopathy [No Spinal Tenderness] : no spinal tenderness [No Joint Swelling] : no joint swelling [Grossly Normal Strength/Tone] : grossly normal strength/tone [No Rash] : no rash [Coordination Grossly Intact] : coordination grossly intact [Normal Gait] : normal gait [No Focal Deficits] : no focal deficits [Normal Affect] : the affect was normal [Normal Insight/Judgement] : insight and judgment were intact [FreeTextEntry1] : 2+ prostate- guaic neg

## 2020-07-28 NOTE — HISTORY OF PRESENT ILLNESS
[FreeTextEntry1] : Routine physical. [de-identified] : This is a 75-year-old male with a history of chronic coronary artery disease followed by cardiology. He recently had a stress echo July 20 was normal. He is known to have diabetes with last hemoglobin A1c of 5.9. He has no chest pain, shortness of breath or chronic complaints. Note is made that the patient has lost 10-12 pounds over the past year. He has been working at home. He does not eat meat. He eats a very healthy diet. There is no abdominal pain. His last colonoscopy was 4 or 5 years ago. He has a history of iron deficiency anemia and takes ferrous gluconate half a tablet daily. He walks for exercise most every day. His weight is currently down to 136.

## 2020-07-31 ENCOUNTER — RX RENEWAL (OUTPATIENT)
Age: 75
End: 2020-07-31

## 2020-08-06 LAB
25(OH)D3 SERPL-MCNC: 45 NG/ML
ALBUMIN SERPL ELPH-MCNC: 4.8 G/DL
ALP BLD-CCNC: 47 U/L
ALT SERPL-CCNC: 19 U/L
ANION GAP SERPL CALC-SCNC: 18 MMOL/L
AST SERPL-CCNC: 24 U/L
BASOPHILS # BLD AUTO: 0.04 K/UL
BASOPHILS NFR BLD AUTO: 0.8 %
BILIRUB SERPL-MCNC: 0.4 MG/DL
BUN SERPL-MCNC: 18 MG/DL
CALCIUM SERPL-MCNC: 10.2 MG/DL
CHLORIDE SERPL-SCNC: 102 MMOL/L
CHOLEST SERPL-MCNC: 126 MG/DL
CHOLEST/HDLC SERPL: 2.2 RATIO
CO2 SERPL-SCNC: 26 MMOL/L
CREAT SERPL-MCNC: 1.09 MG/DL
EOSINOPHIL # BLD AUTO: 0.13 K/UL
EOSINOPHIL NFR BLD AUTO: 2.7 %
ESTIMATED AVERAGE GLUCOSE: 114 MG/DL
GLUCOSE SERPL-MCNC: 81 MG/DL
HBA1C MFR BLD HPLC: 5.6 %
HCT VFR BLD CALC: 48.6 %
HDLC SERPL-MCNC: 57 MG/DL
HGB BLD-MCNC: 14.1 G/DL
IMM GRANULOCYTES NFR BLD AUTO: 0.2 %
LDLC SERPL CALC-MCNC: 60 MG/DL
LYMPHOCYTES # BLD AUTO: 1.63 K/UL
LYMPHOCYTES NFR BLD AUTO: 33.3 %
MAN DIFF?: NORMAL
MCHC RBC-ENTMCNC: 25.9 PG
MCHC RBC-ENTMCNC: 29 GM/DL
MCV RBC AUTO: 89.2 FL
MONOCYTES # BLD AUTO: 0.55 K/UL
MONOCYTES NFR BLD AUTO: 11.2 %
NEUTROPHILS # BLD AUTO: 2.53 K/UL
NEUTROPHILS NFR BLD AUTO: 51.8 %
PLATELET # BLD AUTO: 177 K/UL
POTASSIUM SERPL-SCNC: 4.4 MMOL/L
PROT SERPL-MCNC: 7 G/DL
PSA SERPL-MCNC: 2.14 NG/ML
RBC # BLD: 5.45 M/UL
RBC # FLD: 14.1 %
SODIUM SERPL-SCNC: 146 MMOL/L
TRIGL SERPL-MCNC: 47 MG/DL
TSH SERPL-ACNC: 2.8 UIU/ML
WBC # FLD AUTO: 4.89 K/UL

## 2020-08-31 ENCOUNTER — APPOINTMENT (OUTPATIENT)
Dept: ENDOCRINOLOGY | Facility: CLINIC | Age: 75
End: 2020-08-31
Payer: COMMERCIAL

## 2020-08-31 VITALS
WEIGHT: 134 LBS | HEIGHT: 70 IN | BODY MASS INDEX: 19.18 KG/M2 | DIASTOLIC BLOOD PRESSURE: 78 MMHG | SYSTOLIC BLOOD PRESSURE: 120 MMHG | OXYGEN SATURATION: 98 % | HEART RATE: 57 BPM

## 2020-08-31 PROCEDURE — 36415 COLL VENOUS BLD VENIPUNCTURE: CPT

## 2020-08-31 PROCEDURE — 99214 OFFICE O/P EST MOD 30 MIN: CPT | Mod: 25

## 2020-08-31 RX ORDER — SITAGLIPTIN AND METFORMIN HYDROCHLORIDE 50; 500 MG/1; MG/1
50-500 TABLET, FILM COATED ORAL TWICE DAILY
Qty: 180 | Refills: 3 | Status: DISCONTINUED | COMMUNITY
Start: 1900-01-01 | End: 2020-08-31

## 2020-08-31 NOTE — REVIEW OF SYSTEMS
[Fatigue] : fatigue [Recent Weight Loss (___ Lbs)] : recent weight loss: [unfilled] lbs [All other systems negative] : All other systems negative

## 2020-08-31 NOTE — HISTORY OF PRESENT ILLNESS
[FreeTextEntry1] : Aug 31, 2020    in person\par \par PCP: Dr. Joe Teixeira \par             Card: Patricia Calle (Hx anginia, elev lipids \par             GI: Dr. Hayes (Hx anemia) - colonoscopy last week - OK\par             Ophthalmology: Santiago Orlovaldemar p [phone] f [phone]\par             Pod: Dr. AKRIME Callahan - available\par             Ortho - Dr. Dee at [facility]\par            .\par            CC: Diabetes since 2007 (, A1c 11.9 %) ***    3/2019  5.9;  10/2019  5.9\par             (anemia) \par             (L knee replaced - Dr. Lopez at [facility]) \par             (sleep challenges)\par             (ASHD)\par             ( 6/2018: back pain: scoliosis)\par             (6/2018: hepatic cyst)\par \par Remains on JanuMet  BID\par Concerned about weight loss.\par \par Plan:   Stop JanuMet and start Januvia 50 mg daily \par \par \par Feb 24, 2020\par \par PCP: Dr. Joe Teixeira \par             Card: Patricia Calle (Hx anginia, elev lipids \par             GI: Dr. Hayes (Hx anemia) - colonoscopy last week - OK\par             Ophthalmology: Santiago Solareslovaldemar p [phone] f [phone]\par             Pod: Dr. KARIME Callahan - available\par             Ortho - Dr. Dee at [facility]\par            .\par            CC: Diabetes since 2007 (, A1c 11.9 %) ***    3/2019  5.9;  10/2019  5.9\par             (anemia) \par             (L knee replaced - Dr. Lopez at [facility]) \par             (sleep challenges)\par             (ASHD)\par             ( 6/2018: back pain: scoliosis)\par             (6/2018: hepatic cyst)\par \par Remains on JanuMet  BID\par Feels well.\par A1c in March and Oct:  5.9 %\par \par Impression:  Doing well.\par \par Plan:  Updated A1c, BMP.\par See GI to follow up on hepatic cyst\par \par \par \par \par Oct 25, 2019\par \par PCP: Dr. Joe Teixeira \par             Card: Patricia Calle (Hx anginia, elev lipids \par             GI: Dr. Hayes (Hx anemia) - colonoscopy last week - OK\par             Ophthalmology: Santiago Orloff p [phone] f [phone]\par             Pod: Dr. KARIME Callahan - available\par             Ortho - Dr. Dee at [facility]\par            .\par            CC: Diabetes since 2007 (, A1c 11.9 %) ***\par             (anemia) \par             (L knee replaced - Dr. Lopez at [facility]) \par             (sleep challenges)\par             (ASHD)\par             ( 6/2018: back pain: scoliosis)\par             (6/2018: hepatic cyst)\par \par Visits for diabetes for which he takes JanuMet  BID.\par NR\par Last A1c in March 2019 was 5.9 %\par \par Impression:  By history, doing well.  Last saw ophthalmology during the summer.\par \par Plan:  Updated A1c today.\par ROV in March or April with a few records.  \par \par \par \par \par March 18, 2019\par \par  PCP: Dr. Joe Teixeira \par             Card: Patricia Calle (Hx anginia, elev lipids \par             GI: Dr. Hayes (Hx anemia) - colonoscopy last week - OK\par             Ophthalmology: Santiago Valles p  f [phone]\par             Pod: Dr. KARIME Callahan - available\par             Ortho - Dr. Dee at [facility]\par            .\par            CC: Diabetes since 2007 (, A1c 11.9 %) ***\par             (anemia) \par             (L knee replaced - Dr. Lopez at [facility]) \par             (sleep challenges)\par             (ASHD)\par             ( 6/2018: back pain: scoliosis)\par             (6/2018: hepatic cyst)\par \par At [place] on\par 3/5/2019\par A1c 5.9\par B12 596\par ferritin 30 ()\par WBC 4.18\par Hct 41.3\par serum iron 103\par \par \par \par \par \par Previous notes from [employer] appended below.\par \par    Sept 17, 2018\par            .\par            PCP: Dr. Joe Teixeira \par             Card: Patricia Calle (Hx anginia, elev lipids \par             GI: Dr. Hayes (Hx anemia) - colonoscopy last week - OK\par             Ophthalmology: Santiago Valles p  f [phone]\par             Pod: Dr. KARIME Callahan - available\par             Ortho - Dr. Dee at  [facility]\par            .\par            CC: Diabetes since 2007 (, A1c 11.9 %) ***\par             (anemia) \par             (L knee replaced - Dr. Lopez at [facility]) \par             (sleep challenges)\par             (ASHD)\par             ( 6/2018: back pain: scoliosis)\par             (6/2018: hepatic cyst)\par            .\par            5/16/2018 X-ray R hip: mild bilateral hip arthopathy....arterial vasc. calcifications, \par            .\par            6/20/2018 ER visit for back pain\par            -incidental . hepatic cyist detected\par            .\par            9/7/2018 [facility] included\par            microalbumin - 5\par            Hct 40.3\par            MCV 82.4 \par            A1c 5.8 % \par             m/gdl\par            creatinine 1.12\par            calcium 9.7 *****\par            uric acid 5.3 \par            GGT 24\par            \par            ESR 2\par            PTH 40\par            RF neg \par            immunofix: neg \par            Lyme - neg\par            25 OH vit D 45\par            PSA 2.1 \par            1,25 di OH vit D 47 (18-72)\par            ionized calcium normal\par            .\par            Impression: He is on ferrous gluconate for anemia and he would like to know his ferritin and Fe/TIBC.\par            .\par            Eye exam was excellent. .\par            .\par            ==\par            .\par            May 16, 2018\par            .\par            PCP: Dr. Joe Teixeira \par             Card: Patricia Calle (Hx anginia, elev lipids \par             GI: Dr. Hayes (Hx anemia) - colonoscopy last week - OK\par             Ophthalmology: Santiago Valles p  f [phone]\par             Pod: Dr. KARIME Callahan - available\par             Ortho - Dr. Dee at [facility]\par            .\par            CC: Diabetes since 2007 (, A1c 11.9 %) ***\par             (anemia) \par             (L knee replaced - Dr. 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 \par            .\par            Impression: Feeling well.\par            Slightly elevated [facility] calcium. \par            A1c in good range.\par            .\par            Plan: Same Rx. \par            .\par            ==\par            .\par            January 16, 2018\par            .\par            PCP: Dr. Joe Teixeira \par             Card: Patricia Calle (Hx anginia, elev lipids \par             GI: Dr. Hayes (Hx anemia) - colonoscopy last week - OK\par             Ophthalmology: Santiago Valles p  f [phone]\par             Pod: Dr. KARIME Callahan - available\par             Ortho - Dr. Dee at [facility]\par            .\par            CC: Diabetes since 2007 (, A1c 11.9 %) ***\par             (anemia) \par             (L knee replaced - Dr. Lopez at [facility]) \par             (sleep challenges)\par             (ASHD)\par            .\par            Reports he saw ophthalmology a few months ago.\par            Currently has cough - saw Dr. Teixeira.\par            Has funny feeling in toes. \par            .\par            Impresion: He appears to be doing great !\par            .\par            ==\par            .\par            August 8, 2017\par            .\par            PCP: Dr. Joe Teixeira \par             Card: Patricia Calle (Hx anginia, elev lipids \par             GI: Dr. Hayes (Hx anemia) - colonoscopy last week - OK\par             Ophthalmology: Santiago Valles [facility] tomorrow\par             Pod: Dr. KARIME Callahan - available\par             Ortho - Dr. Dee at [facility]\par            .\par            CC: Diabetes since 2007 (, A1c 11.9 %) ***\par             (anemia) \par             (L knee replaced - Dr. Lopez at [facility]) \par             (sleep challenges)\par             (ASHD)\par            .\par            Had colonoscopy about a year ago by Dr. Hayes.\par            Declined upper endoscopy.\par            Took ferrous gluconate.\par            Recent labs at [facility] on\par            7/25/2017 included\par            BS 98\par            TSH 2.29 \par            Hct 42.7\par            MCV 79.7\par            A1c 5.9\par            .\par            .\par            Impression: Diabetes under excellent control based on his own fingerstick sugars as well as recent A1c of 5.9%\par            .\par            Plan: He will see Dr. Teixeira very soon.\par            ROV in about six months. Thank you. -jh\par            .\par            ==\par            .\par            Feb 8, 2017\par            .\par            PCP: Dr. Joe Teixeira \par             Card: Patricia Calle (Hx anginia, elev lipids \par             GI: Dr. Hayes (Hx anemia) - colonoscopy last week - OK\par             Ophthalmology: [facility] tomorrow\par             Pod: Dr. KARIME Callahan - available\par             Ortho - Dr. Dee at [facility]\par            .\par            CC: Diabetes since 2007 (, A1c 11.9 %) ***\par             (anemia) \par             (L knee replaced - Dr. Lopez at [facility]) \par             (sleep challenges)\par             (ASHD)\par            .\par            On Bystolic and\par            JanuMet  BID for diabetes.\par            .\par            Recent [facility] A1c 6.2 %\par            urine microalbu 0.6 \par            .\par            Impression: Doing very well.\par            .\par            Plan: Reviewed fingerstick BS and guidelines.\par            Need for annual eye exam. \par            .\par            ==\par            .\par            August 8, 2016\par            .\par            PCP: Dr. Joe Teixeira \par             Card: Patricia Calle (Hx anginia, elev lipids \par             GI: Dr. Hayes (Hx anemia) - colonoscopy last week - OK\par             Ophthalmology: [facility] tomorrow\par             Pod: Dr. KARIME Callahan - available\par             Ortho - Dr. Dee at [facility]\par            .\par            CC: Diabetes since 2007 (, A1c 11.9 %) ***\par             (anemia) \par             (L knee replaced - Dr. Lopez at [facility]) \par             (sleep challenges)\par             (ASHD)\par            .\par            Recent labs ([facility]) show\par             mg/dl\par            B12 461 \par            HbA1c 6.3 %\par            .\par            Impression: Doing excellent job of controlling sugars.\par            .\par            Plan: Same Rx: JanuMet, diet, exercise. \par            .\par            ==\par            .\par            February 15, 2016\par            .\par            PCP: Dr. Joe Teixeira \par             Card: Patricia Calle (Hx anginia, elev lipids \par             GI: Dr. Hayes (Hx anemia)\par             Ophthalmology: Santiago Orloff [place] good report\par             Pod: Dr. KARIME Callahan \par            .\par            CC: Diabetes since 2007 (, A1c 11.9 %) ***\par             (anemia) \par             (L knee replaced - Dr. Lopez at [facility]) \par             (sleep challenges)\par             (ASHD)\par            .\par            Had recent evaluation for possible angina.\par            Studies turned out well.\par            Bystolic and isosorbide were prescribe.\par            Plans trip to [facility].\par            .\par            Eyes last checked a few months ago and was given a good report. \par            .\par            JanuMet  BID [facility] [place] 60 days at a time\par            .\par            A1c 6.2 % (up from 6.0%)\par            .\par            Impression: Doing very well.\par            .\par            Plan: Restart fingerstick BS monitoring. \par            Updated A1c, B12, lipids in 4 months. \par            .\par            .\par            ==\par            .\par            August 19, 2015\par            .\par            PCP: Dr. Joe Teixeira \par             Card: Patricia Calle (Hx anginia, elev lipids \par             GI: Dr. Hayes (Hx anemia)\par             Ophthalmology: Santiago Orloff [place] good report\par             Pod: Dr. KARIME Callahan \par            .\par            CC: Diabetes since 2007 (, A1c 11.9 %) ***\par             (anemia) (L knee replaced - Dr. Lopez at [facility])\par             (sleep challenges)\par            .\par            Has an autographed copy of text by Bola Tim\par            Recent FBS 78 mg/dl\par            HbA1c 6.0%\par            .\par            Takes JanuMet  BID\par            .\par            Impression: Doing well. Notes some stomach rumbling so I will advise trial of decreasing the JanuMet to daily and repeating labs in about six months.\par            .\par            .\par            Plan: Same Rx and ROV six months. \par            .\par            ==\par            .\par            Feb 6, 2015\par            .\par            PCP: Dr. Joe Teixeira \par             Card: Patricia Calle (Hx anginia, elev lipids \par             GI: Dr. Hayes (Hx anemia)\par             Ophthalmology: Santiago Valles [facility] \par             Pod: Dr. Callahan\par            .\par            CC: Diabetes since 2007 (, A1c 11.9 %)\par             (anemia) (L knee replaced - Dr. Lopez at [facility])\par             (Lyme disease)\par             (sleep challenges)\par            .\par            Now teaching genomics.\par            .\par            Lab Jan 2015\par            FBS 99, \par            Urine microalbumin normal\par            HDL 42\par            LDL 64\par            TSH 1.68 Hct 43.7\par            A1c 6.5%\par            .\par            He admits that he exercises a lot and eats properly.\par            .\par            Meds for DM: JanuMet  BID\par            Atovastin 10 mg\par            Amlodipine 5 mg\par            Hyzaar - HCTZ\par            ASA 81 mg/dl\par            Symbacort\par            .\par            Impression: Diabetes well controlled. \par            Tests fingerstick BS about once a week.\par            .\par            Plan: Same Rx and ROV 6 months.\par            .\par            .\par            .\par            .\par            ====\par            Dec 18, 2013\par            PCP: Dr. Teixeira\par            CC: DM since 2007\par            .\par            Summary of history below.\par            Going to [place] as a friend there is quite ill. \par            Says FBS may be 100 - 115. \par            FBS has drifted up a bit.\par            Will consider adding glipizide ER 2.5 mg in PM and\par            continue Janumet 50/500 BID for now.\par            ..\par            ===\par            May 17, 2013\par            PCP: Dr. Joe Teixeira Card: Patricia Calle GI: Dr. Hayes\par             Ophthalmology: [facility] Pod: Dr. Callahan\par            CC: Diabetes (anemia) (L knee replaced - Dr. Lopez at [facility])\par            Tick bite Nov 2012 - Doxy from acupuncturist. \par            .\par            DM Dx'd by Dr. Teixeira 2007 after he developed dizziness and BS over 600 with A1c of 11.9%.\par            Last note appended below. Has seen Dr. Calle - has angina, heart murmur. He is supposed to restart his walking, eliptical, etc. \par            .\par            Remains on Janumet 50/500 BID Recent A1c higher at 6.7%. Urine microalbumin within normal limits. Lipids not part of this panel.\par            .\par            .\par            Did not f/u to GI regarding anemia, but it resolved on Fe. \par            He feels that muscle pain may benefit from re-starting HGH. \par            .\par            .\par            +++++++++\par            # Hx angina - Dr. Calle - on Lipitor\par            # Recent anemia - colonoscopy by Dr. Hayes, declined upper endoscopy, Hct now back to normal\par            # Diabetes: on Janumet 50/500 BID. FBS in good range. A1c 6.1% Feels well. Recentlyu went to [place]. February 15, 2016\par            .\par            PCP: Dr. Joe Teixeira \par             Card: Patricia Calle (Hx anginia, elev lipids \par             GI: Dr. Hayes (Hx anemia)\par             Ophthalmology: Santiago Orloff [place] good report\par             Pod: Dr. KARIME Callahan \par            .\par            CC: Diabetes since 2007 (, A1c 11.9 %) ***\par             (anemia) \par             (L knee replaced - Dr. Lopez at [facility]) \par             (sleep challenges)\par             (ASHD)\par            .\par            Had recent evaluation for possible angina.\par            Studies turned out well.\par            Bystolic and isosorbide were prescribe.\par            Plans trip to [facility].\par            .\par            Eyes last checked a few months ago and was given a good report. \par            .\par            JanuMet  BID [facility] [place] 60 days at a time\par            .\par            A1c 6.2 % (up from 6.0%)\par            .\par            Impression: Doing very well.\par            .\par            Plan: Restart fingerstick BS monitoring. \par            Updated A1c, B12, lipids in 4 months. \par            .\par            .\par            ==\par            .\par            August 19, 2015\par            .\par            PCP: Dr. Joe Teixeira \par             Card: Patricia Calle (Hx anginia, elev lipids \par             GI: Dr. Hayes (Hx anemia)\par             Ophthalmology: Santiago Orloff [place] good report\par             Pod: Dr. KARIME Callahan \par            .\par            CC: Diabetes since 2007 (, A1c 11.9 %) ***\par             (anemia) (L knee replaced - Dr. Lopez at [facility])\par             (sleep challenges)\par            .\par            Has an autographed copy of text by Bola Tim\par            Recent FBS 78 mg/dl\par            HbA1c 6.0%\par            .\par            Takes JanuMet  BID\par            .\par            Impression: Doing well. Notes some stomach rumbling so I will advise trial of decreasing the JanuMet to daily and repeating labs in about six months.\par            .\par            .\par            Plan: Same Rx and ROV six months. \par            .\par            ==\par            .\par            Feb 6, 2015\par            .\par            PCP: Dr. Joe Teixeira \par             Card: Patricia Calle (Hx anginia, elev lipids \par             GI: Dr. Hayes (Hx anemia)\par             Ophthalmology: [facility] \par             Pod: Dr. Callahan\par            .\par            CC: Diabetes since 2007 (, A1c 11.9 %)\par             (anemia) (L knee replaced - Dr. Lopez at [facility])\par             (Lyme disease)\par             (sleep challenges)\par            .\par            Now teaching genomics.\par            .\par            Lab Jan 2015\par            FBS 99, \par            Urine microalbumin normal\par            HDL 42\par            LDL 64\par            TSH 1.68 Hct 43.7\par            A1c 6.5%\par            .\par            He admits that he exercises a lot and eats properly.\par            .\par            Meds for DM: JanuMet  BID\par            Atovastin 10 mg\par            Amlodipine 5 mg\par            Hyzaar - HCTZ\par            ASA 81 mg/dl\par            Symbacort\par            .\par            Impression: Diabetes well controlled. \par            Tests fingerstick BS about once a week.\par            .\par            Plan: Same Rx and ROV 6 months.\par            .\par            .\par            .\par            .\par            ====\par            Dec 18, 2013\par            PCP: Dr. Teixeira\par            CC: DM since 2007\par            .\par            Summary of history below.\par            Going to [place] as a friend there is quite ill. \par            Says FBS may be 100 - 115. \par            FBS has drifted up a bit.\par            Will consider adding glipizide ER 2.5 mg in PM and\par            continue Janumet 50/500 BID for now.\par            ..\par            ===\par            May 17, 2013\par            PCP: Dr. Joe Teixeira Card: Patricia Calle GI: Dr. Hayes\par             Ophthalmology: Santiago Valles [facility] Pod: Dr. Callahan\par            CC: Diabetes (anemia) (L knee replaced - Dr. Lopez at [facility])\par            Tick bite Nov 2012 - Doxy from acupuncturist. \par            .\par            DM Dx'd by Dr. Teixeira 2007 after he developed dizziness and BS over 600 with A1c of 11.9%.\par            Last note appended below. Has seen Dr. Calle - has angina, heart murmur. He is supposed to restart his walking, eliptical, etc. \par            .\par            Remains on Janumet 50/500 BID Recent A1c higher at 6.7%. Urine microalbumin within normal limits. Lipids not part of this panel.\par            .\par            .\par            Did not f/u to GI regarding anemia, but it resolved on Fe. \par            He feels that muscle pain may benefit from re-starting HGH. \par            .\par            .\par            +++++++++\par            # Hx angina - Dr. Calle - on Lipitor\par            # Recent anemia - colonoscopy by Dr. Hayes, declined upper endoscopy, Hct now back to normal\par            # Diabetes: on Janumet 50/500 BID. FBS in good range. A1c 6.1% Feels well. Recentlyu went to [place]. February 15, 2016\par            .\par            PCP: Dr. Joe Teixeira \par             Card: Patricia Calle (Hx anginia, elev lipids \par             GI: Dr. Hayes (Hx anemia)\par             Ophthalmology: Santiago Orloff [place] good report\par             Pod: Dr. KARIME Callahan \par            .\par            CC: Diabetes since 2007 (, A1c 11.9 %) ***\par             (anemia) \par             (L knee replaced - Dr. Lopez at [facility]) \par             (sleep challenges)\par             (ASHD)\par            .\par            Had recent evaluation for possible angina.\par            Studies turned out well.\par            Bystolic and isosorbide were prescribe.\par            Plans trip to [facility].\par            .\par            Eyes last checked a few months ago and was given a good report. \par            .\par            JanuMet  BID [facility] [place] 60 days at a time\par            .\par            A1c 6.2 % (up from 6.0%)\par            .\par            Impression: Doing very well.\par            .\par            Plan: Restart fingerstick BS monitoring. \par            Updated A1c, B12, lipids in 4 months. \par            .\par            .\par            ==\par            .\par            August 19, 2015\par            .\par            PCP: Dr. Joe Teixeira \par             Card: Patricia Calle (Hx anginia, elev lipids \par             GI: Dr. Hayes (Hx anemia)\par             Ophthalmology: Santiago Orloff [place] good report\par             Pod: Dr. KARIME Callahan \par            .\par            CC: Diabetes since 2007 (, A1c 11.9 %) ***\par             (anemia) (L knee replaced - Dr. Lopez at [facility])\par             (sleep challenges)\par            .\par            Has an autographed copy of text by Bola Tim\par            Recent FBS 78 mg/dl\par            HbA1c 6.0%\par            .\par            Takes JanuMet  BID\par            .\par            Impression: Doing well. Notes some stomach rumbling so I will advise trial of decreasing the JanuMet to daily and repeating labs in about six months.\par            .\par            .\par            Plan: Same Rx and ROV six months. \par            .\par            ==\par            .\par            Feb 6, 2015\par            .\par            PCP: Dr. Joe Teixeira \par             Card: Patricia Calle (Hx anginia, elev lipids \par             GI: Dr. Hayes (Hx anemia)\par             Ophthalmology: Santiago Valles [facility] \par             Pod: Dr. Callahan\par            .\par            CC: Diabetes since 2007 (, A1c 11.9 %)\par             (anemia) (L knee replaced - Dr. Lopez at [facility])\par             (Lyme disease)\par             (sleep challenges)\par            .\par            Now teaching genomics.\par            .\par            Lab Jan 2015\par            FBS 99, \par            Urine microalbumin normal\par            HDL 42\par            LDL 64\par            TSH 1.68 Hct 43.7\par            A1c 6.5%\par            .\par            He admits that he exercises a lot and eats properly.\par            .\par            Meds for DM: JanuMet  BID\par            Atovastin 10 mg\par            Amlodipine 5 mg\par            Hyzaar - HCTZ\par            ASA 81 mg/dl\par            Symbacort\par            .\par            Impression: Diabetes well controlled. \par            Tests fingerstick BS about once a week.\par            .\par            Plan: Same Rx and ROV 6 months.\par            .\par            .\par            .\par            .\par            ====\par            Dec 18, 2013\par            PCP: Dr. Teixeira\par            CC: DM since 2007\par            .\par            Summary of history below.\par            Going to [place] as a friend there is quite ill. \par            Says FBS may be 100 - 115. \par            FBS has drifted up a bit.\par            Will consider adding glipizide ER 2.5 mg in PM and\par            continue Janumet 50/500 BID for now.\par            ..\par            ===\par            May 17, 2013\par            PCP: Dr. Joe Teixeira Card: Patricia Calle GI: Dr. Hayes\par             Ophthalmology: Santiago Valles [facility] Pod: Dr. Callahan\par            CC: Diabetes (anemia) (L knee replaced - Dr. Lopez at [facility])\par            Tick bite Nov 2012 - Doxy from acupuncturist. \par            .\par            DM Dx'd by Dr. Teixeira 2007 after he developed dizziness and BS over 600 with A1c of 11.9%.\par            Last note appended below. Has seen Dr. Calle - has angina, heart murmur. He is supposed to restart his walking, eliptical, etc. \par            .\par            Remains on Janumet 50/500 BID Recent A1c higher at 6.7%. Urine microalbumin within normal limits. Lipids not part of this panel.\par            .\par            .\par            Did not f/u to GI regarding anemia, but it resolved on Fe. \par            He feels that muscle pain may benefit from re-starting HGH. \par            .\par            .\par            +++++++++\par            # Hx angina - Dr. Calle - on Lipitor\par            # Recent anemia - colonoscopy by Dr. Hayes, declined upper endoscopy, Hct now back to normal\par            # Diabetes: on Janumet 50/500 BID. FBS in good range. A1c 6.1% Feels well. Recentlyu went to [place]\par

## 2020-08-31 NOTE — PHYSICAL EXAM
[Well Nourished] : well nourished [Alert] : alert [Healthy Appearance] : healthy appearance [No Acute Distress] : no acute distress [Well Developed] : well developed [No Proptosis] : no proptosis [No Respiratory Distress] : no respiratory distress [Thyroid Not Enlarged] : the thyroid was not enlarged [Normal Rate] : heart rate was normal [No Stigmata of Cushings Syndrome] : no stigmata of Cushings Syndrome [No Involuntary Movements] : no involuntary movements were seen [Normal Gait] : normal gait [No Tremors] : no tremors [Normal Affect] : the affect was normal [Oriented x3] : oriented to person, place, and time [Normal Insight/Judgement] : insight and judgment were intact [Normal Mood] : the mood was normal

## 2020-08-31 NOTE — ASSESSMENT
[FreeTextEntry1] : Diabetes well controlled.\par In view of weight issue, will stop the metformin (as in JanuMet) and switch to\par just Januvia 50 mg daily\par ROV in early December

## 2020-10-07 ENCOUNTER — RESULT REVIEW (OUTPATIENT)
Age: 75
End: 2020-10-07

## 2020-11-10 ENCOUNTER — RESULT REVIEW (OUTPATIENT)
Age: 75
End: 2020-11-10

## 2020-11-10 LAB
ACTH SER-ACNC: 28.2 PG/ML
CORTIS SERPL-MCNC: 15.6 UG/DL
ERYTHROCYTE [SEDIMENTATION RATE] IN BLOOD BY WESTERGREN METHOD: 10 MM/HR
FOLATE SERPL-MCNC: 12.9 NG/ML
LDH SERPL-CCNC: 158 U/L
TTG IGA SER IA-ACNC: <1.2 U/ML
TTG IGA SER-ACNC: NEGATIVE
TTG IGG SER IA-ACNC: 3.4 U/ML
TTG IGG SER IA-ACNC: NEGATIVE
VIT B12 SERPL-MCNC: 631 PG/ML

## 2021-01-11 ENCOUNTER — NON-APPOINTMENT (OUTPATIENT)
Age: 76
End: 2021-01-11

## 2021-01-11 ENCOUNTER — APPOINTMENT (OUTPATIENT)
Dept: CARDIOLOGY | Facility: CLINIC | Age: 76
End: 2021-01-11
Payer: COMMERCIAL

## 2021-01-11 VITALS
SYSTOLIC BLOOD PRESSURE: 120 MMHG | DIASTOLIC BLOOD PRESSURE: 68 MMHG | HEIGHT: 70 IN | HEART RATE: 58 BPM | BODY MASS INDEX: 21.33 KG/M2 | WEIGHT: 149 LBS

## 2021-01-11 DIAGNOSIS — M35.3 POLYMYALGIA RHEUMATICA: ICD-10-CM

## 2021-01-11 PROCEDURE — 93000 ELECTROCARDIOGRAM COMPLETE: CPT

## 2021-01-11 PROCEDURE — 99214 OFFICE O/P EST MOD 30 MIN: CPT

## 2021-01-11 PROCEDURE — 99072 ADDL SUPL MATRL&STAF TM PHE: CPT

## 2021-01-11 RX ORDER — ATORVASTATIN CALCIUM 10 MG/1
10 TABLET, FILM COATED ORAL DAILY
Qty: 90 | Refills: 3 | Status: DISCONTINUED | COMMUNITY
Start: 2019-07-23 | End: 2021-01-11

## 2021-01-11 RX ORDER — SITAGLIPTIN 50 MG/1
50 TABLET, FILM COATED ORAL DAILY
Qty: 30 | Refills: 6 | Status: DISCONTINUED | COMMUNITY
Start: 2020-08-31 | End: 2021-01-11

## 2021-01-11 NOTE — HISTORY OF PRESENT ILLNESS
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 He denies chest pain, dyspnea, palpitations or syncope. He exercises daily without difficulty. 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

## 2021-01-20 ENCOUNTER — RX RENEWAL (OUTPATIENT)
Age: 76
End: 2021-01-20

## 2021-01-25 ENCOUNTER — APPOINTMENT (OUTPATIENT)
Dept: ENDOCRINOLOGY | Facility: CLINIC | Age: 76
End: 2021-01-25
Payer: COMMERCIAL

## 2021-01-25 VITALS
HEIGHT: 70 IN | WEIGHT: 144 LBS | SYSTOLIC BLOOD PRESSURE: 125 MMHG | DIASTOLIC BLOOD PRESSURE: 78 MMHG | OXYGEN SATURATION: 95 % | HEART RATE: 51 BPM | BODY MASS INDEX: 20.62 KG/M2

## 2021-01-25 PROCEDURE — 99072 ADDL SUPL MATRL&STAF TM PHE: CPT

## 2021-01-25 PROCEDURE — 99214 OFFICE O/P EST MOD 30 MIN: CPT | Mod: 25

## 2021-01-25 NOTE — HISTORY OF PRESENT ILLNESS
[FreeTextEntry1] : Jan 25, 2021    in person\par \par PCP: Dr. Joe Teixeira \par             Card: Patricia Calle (Hx anginia, elev lipids \par             GI: Dr. Hayes (Hx anemia) - \par             Ophthalmology: Santiago Valles p [phone] f [phone]\par             Pod: Dr. KARIME Callahan - available\par             Ortho - Dr. Dee at [facility]\par            .\par            CC: Diabetes since 2007 (, A1c 11.9 %) ***    3/2019  5.9;  10/2019  5.9  7/20  5.6 %\par             (anemia) \par             (L knee replaced - Dr. Lopez at [facility]) \par             (sleep challenges)\par             (ASHD)\par             ( 6/2018: back pain: scoliosis)\par             (6/2018: hepatic cyst)\par \par Because of weight loss, switched from JanuMet to Januvia.   When he ran out of Januvia, that was discontinued, so \par currently the diabetes is beling controlled by diet.\par \par : :\par Constitutional:  Alert, well nourished, healthy appearance, no acute distress \par Eyes:  No proptosis, no stare\par Thyroid:\par Pulmonary:  No respiratory distress, no accessory muscle use; normal rate and effort\par Cardiac:  Normal rate\par Vascular: \par Endocrine:  No stigmata of Cushing’s Syndrome\par Musculoskeletal:  Normal gait, no involuntary movements\par Neurology:  No tremors\par Affect/Mood/Psych:  Oriented x 3; normal affect, normal insight/judgement, normal mood \par .\par  Impression  Diabetes well controlled by attention to diet. \par \par Plan:  Updated labs today.\par \par \par \par \par Aug 31, 2020    in person\par \par PCP: Dr. Joe Teixeira \par             Card: Patricia Calle (Hx anginia, elev lipids \par             GI: Dr. Hayes (Hx anemia) - colonoscopy last week - [place]\par             Ophthalmology: Santiago Valles p [phone] f [phone]\par             Pod: Dr. KARIME Callahan - available\par             Ortho - Dr. Dee at [facility]\par            .\par            CC: Diabetes since 2007 (, A1c 11.9 %) ***    3/2019  5.9;  10/2019  5.9\par             (anemia) \par             (L knee replaced - Dr. Lopez at [facility]) \par             (sleep challenges)\par             (ASHD)\par             ( 6/2018: back pain: scoliosis)\par             (6/2018: hepatic cyst)\par \par Remains on JanuMet  BID\par Concerned about weight loss.\par \par Plan:   Stop JanuMet and start Januvia 50 mg daily \par \par \par Feb 24, 2020\par \par PCP: Dr. Joe Teixeira \par             Card: Patricia Calle (Hx anginia, elev lipids \par             GI: Dr. Hayes (Hx anemia) - colonoscopy last week - [place]\par             Ophthalmology: Santiago Valles p  f [phone]\par             Pod: Dr. KARIME Callahan - available\par             Ortho - Dr. Dee at [facility]\par            .\par            CC: Diabetes since 2007 (, A1c 11.9 %) ***    3/2019  5.9;  10/2019  5.9\par             (anemia) \par             (L knee replaced - Dr. Lopez at [facility]) \par             (sleep challenges)\par             (ASHD)\par             ( 6/2018: back pain: scoliosis)\par             (6/2018: hepatic cyst)\par \par Remains on JanuMet  BID\par Feels well.\par A1c in March and Oct:  5.9 %\par \par Impression:  Doing well.\par \par Plan:  Updated A1c, BMP.\par See GI to follow up on hepatic cyst\par \par \par \par \par Oct 25, 2019\par \par PCP: Dr. Joe Teixeira \par             Card: Patricia Calle (Hx anginia, elev lipids \par             GI: Dr. Hayes (Hx anemia) - colonoscopy last week - [place]\par             Ophthalmology: Santiago Valles p  f [phone]\par             Pod: Dr. KARIME Callahan - available\par             Ortho - Dr. Dee at [facility]\par            .\par            CC: Diabetes since 2007 (, A1c 11.9 %) ***\par             (anemia) \par             (L knee replaced - Dr. Lopez at [facility]) \par             (sleep challenges)\par             (ASHD)\par             ( 6/2018: back pain: scoliosis)\par             (6/2018: hepatic cyst)\par \par Visits for diabetes for which he takes JanuMet  BID.\par NR\par Last A1c in March 2019 was 5.9 %\par \par Impression:  By history, doing well.  Last saw ophthalmology during the summer.\par \par Plan:  Updated A1c today.\par ROV in March or April with a few records.  \par \par \par \par \par March 18, 2019\par \par  PCP: Dr. Joe Teixeira \par             Card: Patricia Calle (Hx anginia, elev lipids \par             GI: Dr. Hayes (Hx anemia) - colonoscopy last week - [place]\par             Ophthalmology: Santiago Solaresloff p  f [phone]\par             Pod: Dr. KARIME Callahan - available\par             Ortho - Dr. Dee at [facility]\par            .\par            CC: Diabetes since 2007 (, A1c 11.9 %) ***\par             (anemia) \par             (L knee replaced - Dr. Lopez at [facility]) \par             (sleep challenges)\par             (ASHD)\par             ( 6/2018: back pain: scoliosis)\par             (6/2018: hepatic cyst)\par \par At [place] on\par 3/5/2019\par A1c 5.9\par B12 596\par ferritin 30 ()\par WBC 4.18\par Hct 41.3\par serum iron 103\par \par \par \par \par \par Previous notes from [employer] appended below.\par \par    Sept 17, 2018\par            .\par            PCP: Dr. Joe Teixeira \par             Card: Patricia Calle (Hx anginia, elev lipids \par             GI: Dr. Hayes (Hx anemia) - colonoscopy last week - [place]\par             Ophthalmology: Santiago Orloff p  f [phone]\par             Pod: Dr. KARIME Callahan - available\par             Ortho - Dr. Dee at [facility]\par            .\par            CC: Diabetes since 2007 (, A1c 11.9 %) ***\par             (anemia) \par             (L knee replaced - Dr. Lopez at [facility]) \par             (sleep challenges)\par             (ASHD)\par             ( 6/2018: back pain: scoliosis)\par             (6/2018: hepatic cyst)\par            .\par            5/16/2018 X-ray R hip: mild bilateral hip arthopathy....arterial vasc. calcifications, \par            .\par            6/20/2018 ER visit for back pain\par            -incidental . hepatic cyist detected\par            .\par            9/7/2018 [facility] included\par            microalbumin - 5\par            Hct 40.3\par            MCV 82.4 \par            A1c 5.8 % \par             m/gdl\par            creatinine 1.12\par            calcium 9.7 *****\par            uric acid 5.3 \par            GGT 24\par            \par            ESR 2\par            PTH 40\par            RF neg \par            immunofix: neg \par            Lyme - neg\par            25 OH vit D 45\par            PSA 2.1 \par            1,25 di OH vit D 47 (18-72)\par            ionized calcium normal\par            .\par            Impression: He is on ferrous gluconate for anemia and he would like to know his ferritin and Fe/TIBC.\par            .\par            Eye exam was excellent. .\par            .\par            ==\par            .\par            May 16, 2018\par            .\par            PCP: Dr. Joe Teixeira \par             Card: Patricia Calle (Hx anginia, elev lipids \par             GI: Dr. Hayes (Hx anemia) - colonoscopy last week - [place]\par             Ophthalmology: Santiago Valles p  f [phone]\par             Pod: Dr. KARIME Callahan - available\par             Ortho - Dr. Dee at  - [facility]\par            .\par            CC: Diabetes since 2007 (, A1c 11.9 %) ***\par             (anemia) \par             (L knee replaced - Dr. 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 \par            .\par            Impression: Feeling well.\par            Slightly elevated [facility]. \par            A1c in good range.\par            .\par            Plan: Same Rx. \par            .\par            ==\par            .\par            January 16, 2018\par            .\par            PCP: Dr. Joe Teixeira \par             Card: Patricia Calle (Hx anginia, elev lipids \par             GI: Dr. Hayes (Hx anemia) - colonoscopy last week - [place]\par             Ophthalmology: Santiago Solaresvaldemar p  f [phone]\par             Pod: Dr. KARIME Callahan - available\par             Ortho - Dr. Dee at [facility]\par            .\par            CC: Diabetes since 2007 (, A1c 11.9 %) ***\par             (anemia) \par             (L knee replaced - Dr. Lopez at [facility]) \par             (sleep challenges)\par             (ASHD)\par            .\par            Reports he saw ophthalmology a few months ago.\par            Currently has cough - saw Dr. Teixeira.\par            Has funny feeling in toes. \par            .\par            Impresion: He appears to be doing great !\par            .\par            ==\par            .\par            August 8, 2017\par            .\par            PCP: Dr. Joe Teixeira \par             Card: Patricia Calle (Hx anginia, elev lipids \par             GI: Dr. Hayes (Hx anemia) - colonoscopy last week - OK\par             Ophthalmology: Santiago Valles [facility] tomorrow\par             Pod: Dr. KARIME Callahan - available\par             Ortho - Dr. Dee at [facility]\par            .\par            CC: Diabetes since 2007 (, A1c 11.9 %) ***\par             (anemia) \par             (L knee replaced - Dr. Lopez at [facility]) \par             (sleep challenges)\par             (ASHD)\par            .\par            Had colonoscopy about a year ago by Dr. Hayes.\par            Declined upper endoscopy.\par            Took ferrous gluconate.\par            Recent labs at [facility] on\par            7/25/2017 included\par            BS 98\par            TSH 2.29 \par            Hct 42.7\par            MCV 79.7\par            A1c 5.9\par            .\par            .\par            Impression: Diabetes under excellent control based on his own fingerstick sugars as well as recent A1c of 5.9%\par            .\par            Plan: He will see Dr. Teixeira very soon.\par            ROV in about six months. Thank you. -\par            .\par            ==\par            .\par            Feb 8, 2017\par            .\par            PCP: Dr. Joe Teixeira \par             Card: Patricia Calle (Hx anginia, elev lipids \par             GI: Dr. Hayes (Hx anemia) - colonoscopy last week - OK\par             Ophthalmology: [facility] tomorrow\par             Pod: Dr. KARIME Callahan - available\par             Ortho - Dr. Dee at [facility]\par            .\par            CC: Diabetes since 2007 (, A1c 11.9 %) ***\par             (anemia) \par             (L knee replaced - Dr. Lopez at [facility]) \par             (sleep challenges)\par             (ASHD)\par            .\par            On Bystolic and\par            JanuMet  BID for diabetes.\par            .\par            Recent [facility] A1c 6.2 %\par            urine microalbu 0.6 \par            .\par            Impression: Doing very well.\par            .\par            Plan: Reviewed fingerstick BS and guidelines.\par            Need for annual eye exam. \par            .\par            ==\par            .\par            August 8, 2016\par            .\par            PCP: Dr. Joe Teixeira \par             Card: Patricia Calle (Hx anginia, elev lipids \par             GI: Dr. Hayes (Hx anemia) - colonoscopy last week - OK\par             Ophthalmology: [facility] tomorrow\par             Pod: Dr. KARIME Callahan - available\par             Ortho - Dr. Dee at [facility]\par            .\par            CC: Diabetes since 2007 (, A1c 11.9 %) ***\par             (anemia) \par             (L knee replaced - Dr. Lopez at [facility]) \par             (sleep challenges)\par             (ASHD)\par            .\par            Recent labs ([facility]) show\par             mg/dl\par            B12 461 \par            HbA1c 6.3 %\par            .\par            Impression: Doing excellent job of controlling sugars.\par            .\par            Plan: Same Rx: JanuMet, diet, exercise. \par            .\par            ==\par            .\par            February 15, 2016\par            .\par            PCP: Dr. Joe Teixeira \par             Card: Patricia Calle (Hx anginia, elev lipids \par             GI: Dr. Hayes (Hx anemia)\par             Ophthalmology: Santiago Orloff [place] good report\par             Pod: Dr. KARIME Callahan \par            .\par            CC: Diabetes since 2007 (, A1c 11.9 %) ***\par             (anemia) \par             (L knee replaced - Dr. Lopez at [facility]) \par             (sleep challenges)\par             (ASHD)\par            .\par            Had recent evaluation for possible angina.\par            Studies turned out well.\par            Bystolic and isosorbide were prescribe.\par            Plans trip to [facility].\par            .\par            Eyes last checked a few months ago and was given a good report. \par            .\par            JanuMet  BID [facility] 60 days at a time\par            .\par            A1c 6.2 % (up from 6.0%)\par            .\par            Impression: Doing very well.\par            .\par            Plan: Restart fingerstick BS monitoring. \par            Updated A1c, B12, lipids in 4 months. \par            .\par            .\par            ==\par            .\par            August 19, 2015\par            .\par            PCP: Dr. Joe Teixeira \par             Card: Patricia Calle (Hx anginia, elev lipids \par             GI: Dr. Hayes (Hx anemia)\par             Ophthalmology: Santiago Orloff [place] good report\par             Pod: Dr. KARIME Callahan \par            .\par            CC: Diabetes since 2007 (, A1c 11.9 %) ***\par             (anemia) (L knee replaced - Dr. Lopez at [facility])\par             (sleep challenges)\par            .\par            Has an autographed copy of text by Bola Tim\par            Recent FBS 78 mg/dl\par            HbA1c 6.0%\par            .\par            Takes JanuMet  BID\par            .\par            Impression: Doing well. Notes some stomach rumbling so I will advise trial of decreasing the JanuMet to daily and repeating labs in about six months.\par            .\par            .\par            Plan: Same Rx and ROV six months. \par            .\par            ==\par            .\par            Feb 6, 2015\par            .\par            PCP: Dr. Joe Teixeira \par             Card: Patricia Calle (Hx anginia, elev lipids \par             GI: Dr. Hayes (Hx anemia)\par             Ophthalmology: Santiago Valles [facility] \par             Pod: Dr. Callahan\par            .\par            CC: Diabetes since 2007 (, A1c 11.9 %)\par             (anemia) (L knee replaced - Dr. Lopez at [facility])\par             (Lyme disease)\par             (sleep challenges)\par            .\par            Now teaching genomics.\par            .\par            Lab Jan 2015\par            FBS 99, \par            Urine microalbumin normal\par            HDL 42\par            LDL 64\par            TSH 1.68 Hct 43.7\par            A1c 6.5%\par            .\par            He admits that he exercises a lot and eats properly.\par            .\par            Meds for DM: JanuMet  BID\par            Atovastin 10 mg\par            Amlodipine 5 mg\par            Hyzaar - HCTZ\par            ASA 81 mg/dl\par            Symbacort\par            .\par            Impression: Diabetes well controlled. \par            Tests fingerstick BS about once a week.\par            .\par            Plan: Same Rx and ROV 6 months.\par            .\par            .\par            .\par            .\par            ====\par            Dec 18, 2013\par            PCP: Dr. Teixeira\par            CC: DM since 2007\par            .\par            Summary of history below.\par            Going to [place] as a friend there is quite ill. \par            Says FBS may be 100 - 115. \par            FBS has drifted up a bit.\par            Will consider adding glipizide ER 2.5 mg in PM and\par            continue Janumet 50/500 BID for now.\par            ..\par            ===\par            May 17, 2013\par            PCP: Dr. Joe Teixeira Card: Patricia Calle GI: Dr. Hayes\par             Ophthalmology: Santiago Valles [facility] Pod: Dr. Callahan\par            CC: Diabetes (anemia) (L knee replaced - Dr. Lopez at [facility])\par            Tick bite Nov 2012 - Doxy from acupuncturist. \par            .\par            DM Dx'd by Dr. Teixeira 2007 after he developed dizziness and BS over 600 with A1c of 11.9%.\par            Last note appended below. Has seen Dr. Calle - has angina, heart murmur. He is supposed to restart his walking, eliptical, etc. \par            .\par            Remains on Janumet 50/500 BID Recent A1c higher at 6.7%. Urine microalbumin within normal limits. Lipids not part of this panel.\par            .\par            .\par            Did not f/u to GI regarding anemia, but it resolved on Fe. \par            He feels that muscle pain may benefit from re-starting HGH. \par            .\par            .\par            +++++++++\par            # Hx angina - Dr. Calle - on Lipitor\par            # Recent anemia - colonoscopy by Dr. Hayes, declined upper endoscopy, Hct now back to normal\par            # Diabetes: on Janumet 50/500 BID. FBS in good range. A1c 6.1% Feels well. Recentlyu went to [place]. February 15, 2016\par            .\par            PCP: Dr. Joe Teixeira \par             Card: Patricia Calle (Hx anginia, elev lipids \par             GI: Dr. Hayes (Hx anemia)\par             Ophthalmology: Santiago Valles [place] good report\par             Pod: Dr. KARIME Callahan \par            .\par            CC: Diabetes since 2007 (, A1c 11.9 %) ***\par             (anemia) \par             (L knee replaced - Dr. Lopez at [facility]) \par             (sleep challenges)\par             (ASHD)\par            .\par            Had recent evaluation for possible angina.\par            Studies turned out well.\par            Bystolic and isosorbide were prescribe.\par            Plans trip to [facility].\par            .\par            Eyes last checked a few months ago and was given a good report. \par            .\par            JanuMet  BID [facility] 60 days at a time\par            .\par            A1c 6.2 % (up from 6.0%)\par            .\par            Impression: Doing very well.\par            .\par            Plan: Restart fingerstick BS monitoring. \par            Updated A1c, B12, lipids in 4 months. \par            .\par            .\par            ==\par            .\par            August 19, 2015\par            .\par            PCP: Dr. Joe Teixeira \par             Card: Patricia Calle (Hx anginia, elev lipids \par             GI: Dr. Hayes (Hx anemia)\par             Ophthalmology: Santiago Orloff [place] good report\par             Pod: Dr. KARIME Callahan \par            .\par            CC: Diabetes since 2007 (, A1c 11.9 %) ***\par             (anemia) (L knee replaced - Dr. Lopez at [facility])\par             (sleep challenges)\par            .\par            Has an autographed copy of text by Bola Tim\par            Recent FBS 78 mg/dl\par            HbA1c 6.0%\par            .\par            Takes JanuMet  BID\par            .\par            Impression: Doing well. Notes some stomach rumbling so I will advise trial of decreasing the JanuMet to daily and repeating labs in about six months.\par            .\par            .\par            Plan: Same Rx and ROV six months. \par            .\par            ==\par            .\par            Feb 6, 2015\par            .\par            PCP: Dr. Joe Teixeira \par             Card: Patricia Calle (Hx anginia, elev lipids \par             GI: Dr. Hayes (Hx anemia)\par             Ophthalmology: [facility] \par             Pod: Dr. Callahan\par            .\par            CC: Diabetes since 2007 (, A1c 11.9 %)\par             (anemia) (L knee replaced - Dr. Lopez at [facility])\par             (Lyme disease)\par             (sleep challenges)\par            .\par            Now teaching genomics.\par            .\par            Lab Jan 2015\par            FBS 99, \par            Urine microalbumin normal\par            HDL 42\par            LDL 64\par            TSH 1.68 Hct 43.7\par            A1c 6.5%\par            .\par            He admits that he exercises a lot and eats properly.\par            .\par            Meds for DM: JanuMet  BID\par            Atovastin 10 mg\par            Amlodipine 5 mg\par            Hyzaar - HCTZ\par            ASA 81 mg/dl\par            Symbacort\par            .\par            Impression: Diabetes well controlled. \par            Tests fingerstick BS about once a week.\par            .\par            Plan: Same Rx and ROV 6 months.\par            .\par            .\par            .\par            .\par            ====\par            Dec 18, 2013\par            PCP: Dr. Teixeira\par            CC: DM since 2007\par            .\par            Summary of history below.\par            Going to [place] as a friend there is quite ill. \par            Says FBS may be 100 - 115. \par            FBS has drifted up a bit.\par            Will consider adding glipizide ER 2.5 mg in PM and\par            continue Janumet 50/500 BID for now.\par            ..\par            ===\par            May 17, 2013\par            PCP: Dr. Joe Teixeira Card: Patricia Calle GI: Dr. Hayes\par             Ophthalmology: Santiago Valles [facility] Pod: Dr. Callahan\par            CC: Diabetes (anemia) (L knee replaced - Dr. Lopez at [facility])\par            Tick bite Nov 2012 - Doxy from acupuncturist. \par            .\par            DM Dx'd by Dr. Teixeira 2007 after he developed dizziness and BS over 600 with A1c of 11.9%.\par            Last note appended below. Has seen Dr. Calle - has angina, heart murmur. He is supposed to restart his walking, eliptical, etc. \par            .\par            Remains on Janumet 50/500 BID Recent A1c higher at 6.7%. Urine microalbumin within normal limits. Lipids not part of this panel.\par            .\par            .\par            Did not f/u to GI regarding anemia, but it resolved on Fe. \par            He feels that muscle pain may benefit from re-starting HGH. \par            .\par            .\par            +++++++++\par            # Hx angina - Dr. Calle - on Lipitor\par            # Recent anemia - colonoscopy by Dr. Hayes, declined upper endoscopy, Hct now back to normal\par            # Diabetes: on Janumet 50/500 BID. FBS in good range. A1c 6.1% Feels well. Recentlyu went to [place]. February 15, 2016\par            .\par            PCP: Dr. Joe Teixeira \par             Card: Patricia Calle (Hx anginia, elev lipids \par             GI: Dr. Hayes (Hx anemia)\par             Ophthalmology: Santiago Orloff [place] good report\par             Pod: Dr. KARIME Callahan \par            .\par            CC: Diabetes since 2007 (, A1c 11.9 %) ***\par             (anemia) \par             (L knee replaced - Dr. Lopez at [facility]) \par             (sleep challenges)\par             (ASHD)\par            .\par            Had recent evaluation for possible angina.\par            Studies turned out well.\par            Bystolic and isosorbide were prescribe.\par            Plans trip to [facility].\par            .\par            Eyes last checked a few months ago and was given a good report. \par            .\par            JanuMet  BID [facility] 60 days at a time\par            .\par            A1c 6.2 % (up from 6.0%)\par            .\par            Impression: Doing very well.\par            .\par            Plan: Restart fingerstick BS monitoring. \par            Updated A1c, B12, lipids in 4 months. \par            .\par            .\par            ==\par            .\par            August 19, 2015\par            .\par            PCP: Dr. Joe Teixeira \par             Card: Patricia Calle (Hx anginia, elev lipids \par             GI: Dr. Hayes (Hx anemia)\par             Ophthalmology: Santiago Orloff [place] good report\par             Pod: Dr. KARIME Callahan \par            .\par            CC: Diabetes since 2007 (, A1c 11.9 %) ***\par             (anemia) (L knee replaced - Dr. Lopez at [facility])\par             (sleep challenges)\par            .\par            Has an autographed copy of text by Bola Tim\par            Recent FBS 78 mg/dl\par            HbA1c 6.0%\par            .\par            Takes JanuMet  BID\par            .\par            Impression: Doing well. Notes some stomach rumbling so I will advise trial of decreasing the JanuMet to daily and repeating labs in about six months.\par            .\par            .\par            Plan: Same Rx and ROV six months. \par            .\par            ==\par            .\par            Feb 6, 2015\par            .\par            PCP: Dr. Joe Teixeira \par             Card: Patricia Calle (Hx anginia, elev lipids \par             GI: Dr. Hayes (Hx anemia)\par             Ophthalmology: Santiago Valles [facility] \par             Pod: Dr. Callahan\par            .\par            CC: Diabetes since 2007 (, A1c 11.9 %)\par             (anemia) (L knee replaced - Dr. Lopez at [facility])\par             (Lyme disease)\par             (sleep challenges)\par            .\par            Now teaching genomics.\par            .\par            Lab Jan 2015\par            FBS 99, \par            Urine microalbumin normal\par            HDL 42\par            LDL 64\par            TSH 1.68 Hct 43.7\par            A1c 6.5%\par            .\par            He admits that he exercises a lot and eats properly.\par            .\par            Meds for DM: JanuMet  BID\par            Atovastin 10 mg\par            Amlodipine 5 mg\par            Hyzaar - HCTZ\par            ASA 81 mg/dl\par            Symbacort\par            .\par            Impression: Diabetes well controlled. \par            Tests fingerstick BS about once a week.\par            .\par            Plan: Same Rx and ROV 6 months.\par            .\par            .\par            .\par            .\par            ====\par            Dec 18, 2013\par            PCP: Dr. Teixeira\par            CC: DM since 2007\par            .\par            Summary of history below.\par            Going to [place] as a friend there is quite ill. \par            Says FBS may be 100 - 115. \par            FBS has drifted up a bit.\par            Will consider adding glipizide ER 2.5 mg in PM and\par            continue Janumet 50/500 BID for now.\par            ..\par            ===\par            May 17, 2013\par            PCP: Dr. Joe Teixeira Card: Patricia Calle GI: Dr. Hayes\par             Ophthalmology: Santiago Valles [facility] Pod: Dr. Callahan\par            CC: Diabetes (anemia) (L knee replaced - Dr. Lopez at [facility])\par            Tick bite Nov 2012 - Doxy from acupuncturist. \par            .\par            DM Dx'd by Dr. Teixeira 2007 after he developed dizziness and BS over 600 with A1c of 11.9%.\par            Last note appended below. Has seen Dr. Calle - has angina, heart murmur. He is supposed to restart his walking, eliptical, etc. \par            .\par            Remains on Janumet 50/500 BID Recent A1c higher at 6.7%. Urine microalbumin within normal limits. Lipids not part of this panel.\par            .\par            .\par            Did not f/u to GI regarding anemia, but it resolved on Fe. \par            He feels that muscle pain may benefit from re-starting HGH. \par            .\par            .\par            +++++++++\par            # Hx angina - Dr. Calle - on Lipitor\par            # Recent anemia - colonoscopy by Dr. Hayes, declined upper endoscopy, Hct now back to normal\par            # Diabetes: on Janumet 50/500 BID. FBS in good range. A1c 6.1% Feels well. Recentlyu went to [place]\par

## 2021-01-29 ENCOUNTER — APPOINTMENT (OUTPATIENT)
Dept: INTERNAL MEDICINE | Facility: CLINIC | Age: 76
End: 2021-01-29
Payer: COMMERCIAL

## 2021-01-29 VITALS
DIASTOLIC BLOOD PRESSURE: 80 MMHG | SYSTOLIC BLOOD PRESSURE: 140 MMHG | BODY MASS INDEX: 20.62 KG/M2 | HEIGHT: 70 IN | WEIGHT: 144 LBS | OXYGEN SATURATION: 99 % | HEART RATE: 54 BPM | TEMPERATURE: 98.1 F

## 2021-01-29 PROCEDURE — 99213 OFFICE O/P EST LOW 20 MIN: CPT

## 2021-01-29 PROCEDURE — 99072 ADDL SUPL MATRL&STAF TM PHE: CPT

## 2021-01-29 NOTE — ASSESSMENT
[FreeTextEntry1] : Stool is guaiac-negative. I doubt this patient had rectal bleeding. He is referred to GI for possible colonoscopy as his last colonoscopy was 4 years ago.

## 2021-01-29 NOTE — PHYSICAL EXAM
[No Acute Distress] : no acute distress [Well Nourished] : well nourished [Well Developed] : well developed [Well-Appearing] : well-appearing [Normal Sclera/Conjunctiva] : normal sclera/conjunctiva [PERRL] : pupils equal round and reactive to light [EOMI] : extraocular movements intact [Normal Outer Ear/Nose] : the outer ears and nose were normal in appearance [Normal Oropharynx] : the oropharynx was normal [No JVD] : no jugular venous distention [No Lymphadenopathy] : no lymphadenopathy [Supple] : supple [Thyroid Normal, No Nodules] : the thyroid was normal and there were no nodules present [No Respiratory Distress] : no respiratory distress  [No Accessory Muscle Use] : no accessory muscle use [Clear to Auscultation] : lungs were clear to auscultation bilaterally [Normal Rate] : normal rate  [Regular Rhythm] : with a regular rhythm [Normal S1, S2] : normal S1 and S2 [No Murmur] : no murmur heard [No Carotid Bruits] : no carotid bruits [No Abdominal Bruit] : a ~M bruit was not heard ~T in the abdomen [No Varicosities] : no varicosities [Pedal Pulses Present] : the pedal pulses are present [No Palpable Aorta] : no palpable aorta [No Edema] : there was no peripheral edema [No Extremity Clubbing/Cyanosis] : no extremity clubbing/cyanosis [Soft] : abdomen soft [Non Tender] : non-tender [Non-distended] : non-distended [No Masses] : no abdominal mass palpated [No HSM] : no HSM [Normal Bowel Sounds] : normal bowel sounds [Normal Posterior Cervical Nodes] : no posterior cervical lymphadenopathy [Normal Anterior Cervical Nodes] : no anterior cervical lymphadenopathy [No CVA Tenderness] : no CVA  tenderness [No Spinal Tenderness] : no spinal tenderness [No Joint Swelling] : no joint swelling [Grossly Normal Strength/Tone] : grossly normal strength/tone [No Rash] : no rash [Coordination Grossly Intact] : coordination grossly intact [No Focal Deficits] : no focal deficits [Normal Gait] : normal gait [Deep Tendon Reflexes (DTR)] : deep tendon reflexes were 2+ and symmetric [Normal Affect] : the affect was normal [Normal Insight/Judgement] : insight and judgment were intact [FreeTextEntry1] : stool guiac negative

## 2021-01-29 NOTE — HISTORY OF PRESENT ILLNESS
[FreeTextEntry1] : Evaluation for possible blood in stool [de-identified] : Patient states that 3 days ago he noticed what he thought was blood in his stool. He was not sure of it. Since then he has had no obvious bleeding. Of note his last colonoscopy was 4 years ago. He has a history of an opacified deficiency anemia currently well controlled. He has no abdominal pain.

## 2021-03-03 ENCOUNTER — APPOINTMENT (OUTPATIENT)
Dept: GASTROENTEROLOGY | Facility: CLINIC | Age: 76
End: 2021-03-03
Payer: COMMERCIAL

## 2021-03-03 VITALS
HEIGHT: 70 IN | DIASTOLIC BLOOD PRESSURE: 70 MMHG | TEMPERATURE: 97.1 F | BODY MASS INDEX: 20.76 KG/M2 | SYSTOLIC BLOOD PRESSURE: 118 MMHG | HEART RATE: 60 BPM | WEIGHT: 145 LBS

## 2021-03-03 PROCEDURE — 99244 OFF/OP CNSLTJ NEW/EST MOD 40: CPT

## 2021-03-03 PROCEDURE — 99072 ADDL SUPL MATRL&STAF TM PHE: CPT

## 2021-03-03 RX ORDER — MONTELUKAST 10 MG/1
10 TABLET, FILM COATED ORAL
Qty: 30 | Refills: 0 | Status: COMPLETED | COMMUNITY
Start: 2020-12-09 | End: 2021-03-03

## 2021-03-03 RX ORDER — DESLORATADINE 5 MG/1
5 TABLET ORAL
Qty: 30 | Refills: 0 | Status: COMPLETED | COMMUNITY
Start: 2020-12-09 | End: 2021-03-03

## 2021-03-03 NOTE — PHYSICAL EXAM
[General Appearance - Alert] : alert [General Appearance - In No Acute Distress] : in no acute distress [Sclera] : the sclera and conjunctiva were normal [Outer Ear] : the ears and nose were normal in appearance [Neck Appearance] : the appearance of the neck was normal [] : no respiratory distress [Apical Impulse] : the apical impulse was normal [Abdomen Soft] : soft [FreeTextEntry1] : deferred [Abnormal Walk] : normal gait [Skin Color & Pigmentation] : normal skin color and pigmentation [No Focal Deficits] : no focal deficits [Oriented To Time, Place, And Person] : oriented to person, place, and time

## 2021-03-03 NOTE — ASSESSMENT
[FreeTextEntry1] : Colon cancer screening  / history of Iron deficiency:  Etiology of iron deficiency is unclear. Colonoscopy recommended. Patient opted for COLOGUARD ( which is only recommended for patient with out signs or symptoms) . Understands possibility of missing small polyps or other cause of low ferritin requiring iron supplementation\par \par \par Cologuard ordered...agrees to colonoscopy if positive\par \par \par \par \par

## 2021-03-03 NOTE — CONSULT LETTER
[Dear  ___] : Dear  [unfilled], [Consult Letter:] : I had the pleasure of evaluating your patient, [unfilled]. [Please see my note below.] : Please see my note below. [Consult Closing:] : Thank you very much for allowing me to participate in the care of this patient.  If you have any questions, please do not hesitate to contact me. [Sincerely,] : Sincerely, [FreeTextEntry3] : Damir Lopez MD\par tel: [phone]\par fax: [phone]\par

## 2021-03-03 NOTE — HISTORY OF PRESENT ILLNESS
[de-identified] : FREDY CORONADO  is being evaluated at the request of Dr. SAMIA Teixeira for an opinion re:  colon cancer screening. Patient denies  nausea, vomiting, fever, chills, diarrhea, constipation, melena, hematemesis, reflux.  Labs reviewed from 1/25/21 notable  for HGB =12.9 with normal MCV and iron studies  ( on Iron) . ferritin in 3/2020 was low  ( 27..normal > 30 ) \par Patient reports colonoscopy ~  5 years ago by Dr. Hayes ( results and reports unavailable)

## 2021-06-05 ENCOUNTER — RX RENEWAL (OUTPATIENT)
Age: 76
End: 2021-06-05

## 2021-06-11 ENCOUNTER — APPOINTMENT (OUTPATIENT)
Dept: INTERNAL MEDICINE | Facility: CLINIC | Age: 76
End: 2021-06-11
Payer: COMMERCIAL

## 2021-06-11 ENCOUNTER — RESULT REVIEW (OUTPATIENT)
Age: 76
End: 2021-06-11

## 2021-06-11 VITALS
TEMPERATURE: 97.2 F | HEIGHT: 70 IN | SYSTOLIC BLOOD PRESSURE: 120 MMHG | BODY MASS INDEX: 21.05 KG/M2 | WEIGHT: 147 LBS | DIASTOLIC BLOOD PRESSURE: 80 MMHG | OXYGEN SATURATION: 98 % | HEART RATE: 77 BPM

## 2021-06-11 PROCEDURE — 99213 OFFICE O/P EST LOW 20 MIN: CPT

## 2021-06-11 NOTE — HISTORY OF PRESENT ILLNESS
[FreeTextEntry1] : Low back pain x 1 month. [de-identified] : Patient complains of low back pain x1 month. The pain is both left and right side. This does not radiate to the legs. It is worse with movement. It is not worse upon awakening in the morning. He has not taken any medication. Of note this patient had an MRI of the abdomen last November with a 6 cm cyst in the liver. This was decreased in size compared with previously. However recommendation was to repeat the MRI in 6 months. Patient's weight has been stable.

## 2021-06-11 NOTE — ASSESSMENT
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 There is no evidence of nerve compression. I will pain an x-ray of the lumbosacral spine. Patient is advised to try a bleed 2 tablets b.i.d. on a p.r.n. basis. Patient is also advised regarding the need to schedule an MRI of the abdomen with and without contrast as per recommendations on prior MRI

## 2021-07-08 ENCOUNTER — RESULT REVIEW (OUTPATIENT)
Age: 76
End: 2021-07-08

## 2021-07-21 ENCOUNTER — APPOINTMENT (OUTPATIENT)
Dept: CARDIOLOGY | Facility: CLINIC | Age: 76
End: 2021-07-21
Payer: COMMERCIAL

## 2021-07-21 DIAGNOSIS — E61.1 IRON DEFICIENCY: ICD-10-CM

## 2021-07-21 PROCEDURE — 99215 OFFICE O/P EST HI 40 MIN: CPT | Mod: 25

## 2021-07-21 PROCEDURE — 93015 CV STRESS TEST SUPVJ I&R: CPT

## 2021-07-21 NOTE — HISTORY OF PRESENT ILLNESS
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 His cologuard was negative.\par He denies chest pain, dyspnea, palpitations or syncope.\par He walks 4 days a week.\par

## 2021-07-24 ENCOUNTER — RX RENEWAL (OUTPATIENT)
Age: 76
End: 2021-07-24

## 2021-07-26 ENCOUNTER — APPOINTMENT (OUTPATIENT)
Dept: ENDOCRINOLOGY | Facility: CLINIC | Age: 76
End: 2021-07-26
Payer: COMMERCIAL

## 2021-07-26 VITALS
HEART RATE: 61 BPM | WEIGHT: 145 LBS | SYSTOLIC BLOOD PRESSURE: 120 MMHG | BODY MASS INDEX: 20.76 KG/M2 | DIASTOLIC BLOOD PRESSURE: 68 MMHG | OXYGEN SATURATION: 94 % | HEIGHT: 70 IN

## 2021-07-26 LAB
ALBUMIN SERPL ELPH-MCNC: 4 G/DL
ALP BLD-CCNC: 57 U/L
ALT SERPL-CCNC: 21 U/L
ANION GAP SERPL CALC-SCNC: 13 MMOL/L
AST SERPL-CCNC: 19 U/L
BASOPHILS # BLD AUTO: 0.05 K/UL
BASOPHILS NFR BLD AUTO: 1.1 %
BILIRUB DIRECT SERPL-MCNC: 0.1 MG/DL
BILIRUB INDIRECT SERPL-MCNC: 0.3 MG/DL
BILIRUB SERPL-MCNC: 0.4 MG/DL
BUN SERPL-MCNC: 17 MG/DL
CALCIUM SERPL-MCNC: 9.5 MG/DL
CHLORIDE SERPL-SCNC: 103 MMOL/L
CO2 SERPL-SCNC: 26 MMOL/L
CREAT SERPL-MCNC: 1.07 MG/DL
EOSINOPHIL # BLD AUTO: 0.15 K/UL
EOSINOPHIL NFR BLD AUTO: 3.3 %
ESTIMATED AVERAGE GLUCOSE: 131 MG/DL
FERRITIN SERPL-MCNC: 39 NG/ML
GLUCOSE SERPL-MCNC: 98 MG/DL
HBA1C MFR BLD HPLC: 6.2 %
HCT VFR BLD CALC: 42.2 %
HGB BLD-MCNC: 12.9 G/DL
IMM GRANULOCYTES NFR BLD AUTO: 0.2 %
IRON SATN MFR SERPL: 26 %
IRON SERPL-MCNC: 81 UG/DL
LYMPHOCYTES # BLD AUTO: 1.59 K/UL
LYMPHOCYTES NFR BLD AUTO: 34.9 %
MAN DIFF?: NORMAL
MCHC RBC-ENTMCNC: 25.7 PG
MCHC RBC-ENTMCNC: 30.6 GM/DL
MCV RBC AUTO: 84.1 FL
MONOCYTES # BLD AUTO: 0.48 K/UL
MONOCYTES NFR BLD AUTO: 10.5 %
NEUTROPHILS # BLD AUTO: 2.28 K/UL
NEUTROPHILS NFR BLD AUTO: 50 %
PLATELET # BLD AUTO: 164 K/UL
POTASSIUM SERPL-SCNC: 3.8 MMOL/L
PROT SERPL-MCNC: 6.1 G/DL
RBC # BLD: 5.02 M/UL
RBC # FLD: 13.6 %
SODIUM SERPL-SCNC: 142 MMOL/L
T4 SERPL-MCNC: 6.6 UG/DL
TIBC SERPL-MCNC: 318 UG/DL
TSH SERPL-ACNC: 1.96 UIU/ML
UIBC SERPL-MCNC: 237 UG/DL
WBC # FLD AUTO: 4.56 K/UL

## 2021-07-26 PROCEDURE — 99214 OFFICE O/P EST MOD 30 MIN: CPT | Mod: 25

## 2021-07-26 NOTE — HISTORY OF PRESENT ILLNESS
[FreeTextEntry1] : Jul 26, 2021     in person\par \par PCP: Dr. Joe Teixeira \par             Card: Patricia Calle (Hx angina, elevated lipids \par             GI: Dr. Damir Lopez (Hx anemia) - \par             Ophthalmology: Santiago Valles p  f [phone]\par             Pod: Dr. KARIME Callahan - available\par             Ortho - Dr. Dee at  -  [facility]\par            .\par            CC: Diabetes since 2007 (, A1c 11.9 %) ***    3/2019  5.9;  10/2019  5.9  7/20  5.6 %\par             (anemia) \par             (L knee replaced - Dr. Lopez at [facility]) \par             (sleep challenges)\par             (ASHD)\par             ( 6/2018: back pain: scoliosis)\par             (6/2018: hepatic cyst)\par \par Because of weight loss, switched from JanuMet to Januvia.   When he ran out of Januvia, that was discontinued, so \par currently the diabetes is being  controlled by diet.\par \par Had recent Cologuard test.\par Feels well.\par Brings in fingerstick BS, mostly fasting.\par Fasting sugars tend to run in range of 104 mg/dl\par \par \par : :                5 ft 10, 145 lb    (keeping to sedafood and veggies)            \par Constitutional:  Alert, well nourished, healthy appearance, no acute distress \par Eyes:  No proptosis, no stare\par Thyroid:\par Pulmonary:  No respiratory distress, no accessory muscle use; normal rate and effort\par Cardiac:  Normal rate\par Vascular: \par Endocrine:  No stigmata of Cushing’s Syndrome\par Musculoskeletal:  Normal gait, no involuntary movements\par Neurology:  No tremors\par Affect/Mood/Psych:  Oriented x 3; normal affect, normal insight/judgement, normal mood \par .\par \par Impression:  Diabetes appears to be well controlled.\par \par Plan:  Records reviewed.\par A1c today.\par ROV six months.  \par Annual eye exam\par \par \par \par Jan 25, 2021    in person\par \par PCP: Dr. Joe Teixeira \par             Card: Patricia Calle (Hx angina, elevated lipids \par             GI: Dr. Damir Lopez (Hx anemia) - \par             Ophthalmology: Santiago Valles p [phone] f [phone]\par             Pod: Dr. KARIME Callahan - available\par             Ortho - Dr. Dee at [facility]\par            .\par            CC: Diabetes since 2007 (, A1c 11.9 %) ***    3/2019  5.9;  10/2019  5.9  7/20  5.6 %\par             (anemia) \par             (L knee replaced - Dr. Lopez at [facility]) \par             (sleep challenges)\par             (ASHD)\par             ( 6/2018: back pain: scoliosis)\par             (6/2018: hepatic cyst)\par \par Because of weight loss, switched from JanuMet to Januvia.   When he ran out of Januvia, that was discontinued, so \par currently the diabetes is being  controlled by diet.\par \par : :\par Constitutional:  Alert, well nourished, healthy appearance, no acute distress \par Eyes:  No proptosis, no stare\par Thyroid:\par Pulmonary:  No respiratory distress, no accessory muscle use; normal rate and effort\par Cardiac:  Normal rate\par Vascular: \par Endocrine:  No stigmata of Cushing’s Syndrome\par Musculoskeletal:  Normal gait, no involuntary movements\par Neurology:  No tremors\par Affect/Mood/Psych:  Oriented x 3; normal affect, normal insight/judgement, normal mood \par .\par  Impression  Diabetes well controlled by attention to diet. \par \par Plan:  Updated labs today.\par \par \par \par \par Aug 31, 2020    in person\par \par PCP: Dr. Joe Teixeira \par             Card: Patricia Calle (Hx anginia, elev lipids \par             GI: Dr. Hayes (Hx anemia) - colonoscopy last week - [place]\par             Ophthalmology: Santiago Solareslovaldemar p [phone] f [phone]\par             Pod: Dr. KARIME Callahan - available\par             Ortho - Dr. Dee at [facility]\par            .\par            CC: Diabetes since 2007 (, A1c 11.9 %) ***    3/2019  5.9;  10/2019  5.9\par             (anemia) \par             (L knee replaced - Dr. Lopez at [facility]) \par             (sleep challenges)\par             (ASHD)\par             ( 6/2018: back pain: scoliosis)\par             (6/2018: hepatic cyst)\par \par Remains on JanuMet  BID\par Concerned about weight loss.\par \par Plan:   Stop JanuMet and start Januvia 50 mg daily \par \par \par Feb 24, 2020\par \par PCP: Dr. Joe Teixeira \par             Card: Patricia Calle (Hx anginia, elev lipids \par             GI: Dr. Hayes (Hx anemia) - colonoscopy last week - [place]\par             Ophthalmology: Santiago Solaresloff p [phone] f [phone]\par             Pod: Dr. KARIME Callahan - available\par             Ortho - Dr. Dee at  -  [facility]\par            .\par            CC: Diabetes since 2007 (, A1c 11.9 %) ***    3/2019  5.9;  10/2019  5.9\par             (anemia) \par             (L knee replaced - Dr. Lopez at [facility]) \par             (sleep challenges)\par             (ASHD)\par             ( 6/2018: back pain: scoliosis)\par             (6/2018: hepatic cyst)\par \par Remains on JanuMet  BID\par Feels well.\par A1c in March and Oct:  5.9 %\par \par Impression:  Doing well.\par \par Plan:  Updated A1c, BMP.\par See GI to follow up on hepatic cyst\par \par \par \par \par Oct 25, 2019\par \par PCP: Dr. Joe Teixeira \par             Card: Patricia Calle (Hx anginia, elev lipids \par             GI: Dr. Hayes (Hx anemia) - colonoscopy last week - [place]\par             Ophthalmology: Santiago Orloff p [phone] f [phone]\par             Pod: Dr. KARIME Callahan - available\par             Ortho - Dr. Dee at  -  [facility]\par            .\par            CC: Diabetes since 2007 (, A1c 11.9 %) ***\par             (anemia) \par             (L knee replaced - Dr. Lopez at [facility]) \par             (sleep challenges)\par             (ASHD)\par             ( 6/2018: back pain: scoliosis)\par             (6/2018: hepatic cyst)\par \par Visits for diabetes for which he takes JanuMet  BID.\par NR\par Last A1c in March 2019 was 5.9 %\par \par Impression:  By history, doing well.  Last saw ophthalmology during the summer.\par \par Plan:  Updated A1c today.\par ROV in March or April with a few records.  \par \par \par \par \par March 18, 2019\par \par  PCP: Dr. Joe Teixeira \par             Card: Patricia Calle (Hx anginia, elev lipids \par             GI: Dr. Hayes (Hx anemia) - colonoscopy last week - [place]\par             Ophthalmology: Santiago Solareslovaldemar p  f [phone]\par             Pod: Dr. KARIME Callahan - available\par             Ortho - Dr. Dee at [facility]\par            .\par            CC: Diabetes since 2007 (, A1c 11.9 %) ***\par             (anemia) \par             (L knee replaced - Dr. Lopez at [facility]) \par             (sleep challenges)\par             (ASHD)\par             ( 6/2018: back pain: scoliosis)\par             (6/2018: hepatic cyst)\par \par At [place] on\par 3/5/2019\par A1c 5.9\par B12 596\par ferritin 30 ()\par WBC 4.18\par Hct 41.3\par serum iron 103\par \par \par \par \par \par Previous notes from [employer] appended below.\par \par    Sept 17, 2018\par            .\par            PCP: Dr. Joe Teixeira \par             Card: Patricia Calle (Hx anginia, elev lipids \par             GI: Dr. Hayes (Hx anemia) - colonoscopy last week - [place]\par             Ophthalmology: Santiago Solareslovaldemar p  f [phone]\par             Pod: Dr. KARIME Callahan - available\par             Ortho - Dr. Dee at [facility]\par            .\par            CC: Diabetes since 2007 (, A1c 11.9 %) ***\par             (anemia) \par             (L knee replaced - Dr. Lopez at [facility]) \par             (sleep challenges)\par             (ASHD)\par             ( 6/2018: back pain: scoliosis)\par             (6/2018: hepatic cyst)\par            .\par            5/16/2018 X-ray R hip: mild bilateral hip arthopathy....arterial vasc. calcifications, \par            .\par            6/20/2018 ER visit for back pain\par            -incidental . hepatic cyist detected\par            .\par            9/7/2018 [facility] included\par            microalbumin - 5\par            Hct 40.3\par            MCV 82.4 \par            A1c 5.8 % \par             m/gdl\par            creatinine 1.12\par            calcium 9.7 *****\par            uric acid 5.3 \par            GGT 24\par            \par            ESR 2\par            PTH 40\par            RF neg \par            immunofix: neg \par            Lyme - neg\par            25 OH vit D 45\par            PSA 2.1 \par            1,25 di OH vit D 47 (18-72)\par            ionized calcium normal\par            .\par            Impression: He is on ferrous gluconate for anemia and he would like to know his ferritin and Fe/TIBC.\par            .\par            Eye exam was excellent. .\par            .\par            ==\par            .\par            May 16, 2018\par            .\par            PCP: Dr. Joe Teixeira \par             Card: Patricia Calle (Hx anginia, elev lipids \par             GI: Dr. Hayes (Hx anemia) - colonoscopy last week - [place]\par             Ophthalmology: Santiago Valles p  f [phone]\par             Pod: Dr. KARIME Callahan - available\par             Ortho - Dr. Dee at  - [facility]\par            .\par            CC: Diabetes since 2007 (, A1c 11.9 %) ***\par             (anemia) \par             (L knee replaced - Dr. Lopez at 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 \par            .\par            Impression: Feeling well.\par            Slightly elevated [facility] calcium. \par            A1c in good range.\par            .\par            Plan: Same Rx. \par            .\par            ==\par            .\par            January 16, 2018\par            .\par            PCP: Dr. Joe Teixeira \par             Card: Patricia Calle (Hx anginia, elev lipids \par             GI: Dr. Hayes (Hx anemia) - colonoscopy last week - [place]\par             Ophthalmology: Santiago Solarespaul p  f [phone]\par             Pod: Dr. KARIME Callahan - available\par             Ortho - Dr. Dee at [facility]\par            .\par            CC: Diabetes since 2007 (, A1c 11.9 %) ***\par             (anemia) \par             (L knee replaced - Dr. Lopez at [facility]) \par             (sleep challenges)\par             (ASHD)\par            .\par            Reports he saw ophthalmology a few months ago.\par            Currently has cough - saw Dr. Teixeira.\par            Has funny feeling in toes. \par            .\par            Impresion: He appears to be doing great !\par            .\par            ==\par            .\par            August 8, 2017\par            .\par            PCP: Dr. Joe Teixeira \par             Card: Patricia Calle (Hx anginia, elev lipids \par             GI: Dr. Hayes (Hx anemia) - colonoscopy last week - [place]\par             Ophthalmology: Santiago Orpaul [facility] tomorrow\par             Pod: Dr. KARIME Callahan - available\par             Ortho - Dr. Dee at [facility]\par            .\par            CC: Diabetes since 2007 (, A1c 11.9 %) ***\par             (anemia) \par             (L knee replaced - Dr. Lopez at [facility]) \par             (sleep challenges)\par             (ASHD)\par            .\par            Had colonoscopy about a year ago by Dr. Hayes.\par            Declined upper endoscopy.\par            Took ferrous gluconate.\par            Recent labs at [facility] on\par            7/25/2017 included\par            BS 98\par            TSH 2.29 \par            Hct 42.7\par            MCV 79.7\par            A1c 5.9\par            .\par            .\par            Impression: Diabetes under excellent control based on his own fingerstick sugars as well as recent A1c of 5.9%\par            .\par            Plan: He will see Dr. Teixeira very soon.\par            ROV in about six months. Thank you. -\par            .\par            ==\par            .\par            Feb 8, 2017\par            .\par            PCP: Dr. Joe Teixeira \par             Card: Patricia Calle (Hx anginia, elev lipids \par             GI: Dr. Hayes (Hx anemia) - colonoscopy last week - [place]\par             Ophthalmology: [facility] tomorrow\par             Pod: Dr. KARIME Callahan - available\par             Ortho - Dr. Dee at [facility]\par            .\par            CC: Diabetes since 2007 (, A1c 11.9 %) ***\par             (anemia) \par             (L knee replaced - Dr. Lopez at [facility]) \par             (sleep challenges)\par             (ASHD)\par            .\par            On Bystolic and\par            JanuMet  BID for diabetes.\par            .\par            Recent [facility] A1c 6.2 %\par            urine microalbu 0.6 \par            .\par            Impression: Doing very well.\par            .\par            Plan: Reviewed fingerstick BS and guidelines.\par            Need for annual eye exam. \par            .\par            ==\par            .\par            August 8, 2016\par            .\par            PCP: Dr. Joe Teixeira \par             Card: Patricia Calle (Hx anginia, elev lipids \par             GI: Dr. Hayes (Hx anemia) - colonoscopy last week - [place]\par             Ophthalmology: [facility] tomorrow\par             Pod: Dr. KARIME Callahan - available\par             Ortho - Dr. Dee at [facility]\par            .\par            CC: Diabetes since 2007 (, A1c 11.9 %) ***\par             (anemia) \par             (L knee replaced - Dr. Lopez at [facility]) \par             (sleep challenges)\par             (ASHD)\par            .\par            Recent labs ([facility]) show\par             mg/dl\par            B12 461 \par            HbA1c 6.3 %\par            .\par            Impression: Doing excellent job of controlling sugars.\par            .\par            Plan: Same Rx: JanuMet, diet, exercise. \par            .\par            ==\par            .\par            February 15, 2016\par            .\par            PCP: Dr. Joe Teixeira \par             Card: Patricia Calle (Hx anginia, elev lipids \par             GI: Dr. aHyes (Hx anemia)\par             Ophthalmology: Santiago Orloff [place] good report\par             Pod: Dr. KARIME Callahan \par            .\par            CC: Diabetes since 2007 (, A1c 11.9 %) ***\par             (anemia) \par             (L knee replaced - Dr. Lopez at [facility]) \par             (sleep challenges)\par             (ASHD)\par            .\par            Had recent evaluation for possible angina.\par            Studies turned out well.\par            Bystolic and isosorbide were prescribe.\par            Plans trip [facility].\par            .\par            Eyes last checked a few months ago and was given a good report. \par            .\par            JanuMet  BID [facility] [place] 60 days at a time\par            .\par            A1c 6.2 % (up from 6.0%)\par            .\par            Impression: Doing very well.\par            .\par            Plan: Restart fingerstick BS monitoring. \par            Updated A1c, B12, lipids in 4 months. \par            .\par            .\par            ==\par            .\par            August 19, 2015\par            .\par            PCP: Dr. Joe Teixeira \par             Card: Patricia Calle (Hx anginia, elev lipids \par             GI: Dr. Hayes (Hx anemia)\par             Ophthalmology: Santiago Orloff [place] good report\par             Pod: Dr. KARIME Callahan \par            .\par            CC: Diabetes since 2007 (, A1c 11.9 %) ***\par             (anemia) (L knee replaced - Dr. Lopez at [facility])\par             (sleep challenges)\par            .\par            Has an autographed copy of text by Bola Tim\par            Recent FBS 78 mg/dl\par            HbA1c 6.0%\par            .\par            Takes JanuMet  BID\par            .\par            Impression: Doing well. Notes some stomach rumbling so I will advise trial of decreasing the JanuMet to daily and repeating labs in about six months.\par            .\par            .\par            Plan: Same Rx and ROV six months. \par            .\par            ==\par            .\par            Feb 6, 2015\par            .\par            PCP: Dr. Joe Teixeira \par             Card: Patricia Calle (Hx anginia, elev lipids \par             GI: Dr. Hayes (Hx anemia)\par             Ophthalmology: Santiago Valles [facility] \par             Pod: Dr. Callahan\par            .\par            CC: Diabetes since 2007 (, A1c 11.9 %)\par             (anemia) (L knee replaced - Dr. Lopez at [facility])\par             (Lyme disease)\par             (sleep challenges)\par            .\par            Now teaching genomics.\par            .\par            Lab Jan 2015\par            FBS 99, \par            Urine microalbumin normal\par            HDL 42\par            LDL 64\par            TSH 1.68 Hct 43.7\par            A1c 6.5%\par            .\par            He admits that he exercises a lot and eats properly.\par            .\par            Meds for DM: JanuMet  BID\par            Atovastin 10 mg\par            Amlodipine 5 mg\par            Hyzaar - HCTZ\par            ASA 81 mg/dl\par            Symbacort\par            .\par            Impression: Diabetes well controlled. \par            Tests fingerstick BS about once a week.\par            .\par            Plan: Same Rx and ROV 6 months.\par            .\par            .\par            .\par            .\par            ====\par            Dec 18, 2013\par            PCP: Dr. Teixeira\par            CC: DM since 2007\par            .\par            Summary of history below.\par            Going to [place] as a friend there is quite ill. \par            Says FBS may be 100 - 115. \par            FBS has drifted up a bit.\par            Will consider adding glipizide ER 2.5 mg in PM and\par            continue Janumet 50/500 BID for now.\par            ..\par            ===\par            May 17, 2013\par            PCP: Dr. Joe Teixeira Card: Patricia Calle GI: Dr. Hayes\par             Ophthalmology: Santiago Valles [facility] Pod: Dr. Callahan\par            CC: Diabetes (anemia) (L knee replaced - Dr. Lopez at [facility])\par            Tick bite Nov 2012 - Doxy from acupuncturist. \par            .\par            DM Dx'd by Dr. Teixeira 2007 after he developed dizziness and BS over 600 with A1c of 11.9%.\par            Last note appended below. Has seen Dr. Calle - has angina, heart murmur. He is supposed to restart his walking, eliptical, etc. \par            .\par            Remains on Janumet 50/500 BID Recent A1c higher at 6.7%. Urine microalbumin within normal limits. Lipids not part of this panel.\par            .\par            .\par            Did not f/u to GI regarding anemia, but it resolved on Fe. \par            He feels that muscle pain may benefit from re-starting HGH. \par            .\par            .\par            +++++++++\par            # Hx angina - Dr. Calle - on Lipitor\par            # Recent anemia - colonoscopy by Dr. Hayes, declined upper endoscopy, Hct now back to normal\par            # Diabetes: on Janumet 50/500 BID. FBS in good range. A1c 6.1% Feels well. Recentlyu went to [place]. February 15, 2016\par            .\par            PCP: Dr. Joe Teixeira \par             Card: Patricia Calle (Hx anginia, elev lipids \par             GI: Dr. Hayes (Hx anemia)\par             Ophthalmology: Santiago Valles [place] good report\par             Pod: Dr. KARIME Callahan \par            .\par            CC: Diabetes since 2007 (, A1c 11.9 %) ***\par             (anemia) \par             (L knee replaced - Dr. Lopez at [facility]) \par             (sleep challenges)\par             (ASHD)\par            .\par            Had recent evaluation for possible angina.\par            Studies turned out well.\par            Bystolic and isosorbide were prescribe.\par            Plans trip to [facility].\par            .\par            Eyes last checked a few months ago and was given a good report. \par            .\par            JanuMet  BID [facility] [place] 60 days at a time\par            .\par            A1c 6.2 % (up from 6.0%)\par            .\par            Impression: Doing very well.\par            .\par            Plan: Restart fingerstick BS monitoring. \par            Updated A1c, B12, lipids in 4 months. \par            .\par            .\par            ==\par            .\par            August 19, 2015\par            .\par            PCP: Dr. Joe Teixeira \par             Card: Patricia Calle (Hx anginia, elev lipids \par             GI: Dr. Hayes (Hx anemia)\par             [facility] [place] good [facility]\par             Pod: Dr. KARIME Callahan \par            .\par            CC: Diabetes since 2007 (, A1c 11.9 %) ***\par             (anemia) (L knee replaced - Dr. Lopez at [facility])\par             (sleep challenges)\par            .\par            Has an autographed copy of text by Bola Tim\par            Recent FBS 78 mg/dl\par            HbA1c 6.0%\par            .\par            Takes JanuMet  BID\par            .\par            Impression: Doing well. Notes some stomach rumbling so I will advise trial of decreasing the JanuMet to daily and repeating labs in about six months.\par            .\par            .\par            Plan: Same Rx and ROV six months. \par            .\par            ==\par            .\par            Feb 6, 2015\par            .\par            PCP: Dr. Joe Teixeira \par             Card: Patricia Calle (Hx anginia, elev lipids \par             GI: Dr. Hayes (Hx anemia)\par             Ophthalmology: Santiago [facility]valdemar [facility] \par             Pod: Dr. Callahan\par            .\par            CC: Diabetes since 2007 (, A1c 11.9 %)\par             (anemia) (L knee replaced - Dr. Lopez at [facility])\par             (Lyme disease)\par             (sleep challenges)\par            .\par            Now teaching genomics.\par            .\par            Lab Jan 2015\par            FBS 99, \par            Urine microalbumin normal\par            HDL 42\par            LDL 64\par            TSH 1.68 Hct 43.7\par            A1c 6.5%\par            .\par            He admits that he exercises a lot and eats properly.\par            .\par            Meds for DM: JanuMet  BID\par            Atovastin 10 mg\par            Amlodipine 5 mg\par            Hyzaar - HCTZ\par            ASA 81 mg/dl\par            Symbacort\par            .\par            Impression: Diabetes well controlled. \par            Tests fingerstick BS about once a week.\par            .\par            Plan: Same Rx and ROV 6 months.\par            .\par            .\par            .\par            .\par            ====\par            Dec 18, 2013\par            PCP: Dr. Teixeira\par            CC: DM since 2007\par            .\par            Summary of history below.\par            Going to [place] as a friend there is quite ill. \par            Says FBS may be 100 - 115. \par            FBS has drifted up a bit.\par            Will consider adding glipizide ER 2.5 mg in PM and\par            continue Janumet 50/500 BID for now.\par            ..\par            ===\par            May 17, 2013\par            PCP: Dr. Joe Teixeira Card: Patricia Calle GI: Dr. Hayes\par             Ophthalmology: [facility] Pod: Dr. Callahan\par            CC: Diabetes (anemia) (L knee replaced - Dr. Lopez at [facility])\par            Tick bite Nov 2012 - Doxy from acupuncturist. \par            .\par            DM Dx'd by Dr. Teixeira 2007 after he developed dizziness and BS over 600 with A1c of 11.9%.\par            Last note appended below. Has seen Dr. Calle - has angina, heart murmur. He is supposed to restart his walking, eliptical, etc. \par            .\par            Remains on Janumet 50/500 BID Recent A1c higher at 6.7%. Urine microalbumin within normal limits. Lipids not part of this panel.\par            .\par            .\par            Did not f/u to GI regarding anemia, but it resolved on Fe. \par            He feels that muscle pain may benefit from re-starting HGH. \par            .\par            .\par            +++++++++\par            # Hx angina - Dr. Calle - on Lipitor\par            # Recent anemia - colonoscopy by Dr. Hayes, declined upper endoscopy, Hct now back to normal\par            # Diabetes: on Janumet 50/500 BID. FBS in good range. A1c 6.1% Feels well. Recentlyu went to [place]. February 15, 2016\par            .\par            PCP: Dr. Joe Teixeira \par             Card: Patricia Calle (Hx anginia, elev lipids \par             GI: Dr. Hayes (Hx anemia)\par             Ophthalmology: Santiago Orloff [place] good report\par             Pod: Dr. KARIME Callahan \par            .\par            CC: Diabetes since 2007 (, A1c 11.9 %) ***\par             (anemia) \par             (L knee replaced - Dr. Lopez at [facility]) \par             (sleep challenges)\par             (ASHD)\par            .\par            Had recent evaluation for possible angina.\par            Studies turned out well.\par            Bystolic and isosorbide were prescribe.\par            Plans trip to [facility].\par            .\par            Eyes last checked a few months ago and was given a good report. \par            .\par            JanuMet  BID [facility] [address] [address] 60 days at a time\par            .\par            A1c 6.2 % (up from 6.0%)\par            .\par            Impression: Doing very well.\par            .\par            Plan: Restart fingerstick BS monitoring. \par            Updated A1c, B12, lipids in 4 months. \par            .\par            .\par            ==\par            .\par            August 19, 2015\par            .\par            PCP: Dr. Joe Teixeira \par             Card: Patricia Calle (Hx anginia, elev lipids \par             GI: Dr. Hayes (Hx anemia)\par             Ophthalmology: Santiago Solaresvaldemar [place] good report\par             Pod: Dr. KARIME Callahan \par            .\par            CC: Diabetes since 2007 (, A1c 11.9 %) ***\par             (anemia) (L knee replaced - Dr. Lopez at [facility])\par             (sleep challenges)\par            .\par            Has an autographed copy of text by Bola Tim\par            Recent FBS 78 mg/dl\par            HbA1c 6.0%\par            .\par            Takes JanuMet  BID\par            .\par            Impression: Doing well. Notes some stomach rumbling so I will advise trial of decreasing the JanuMet to daily and repeating labs in about six months.\par            .\par            .\par            Plan: Same Rx and ROV six months. \par            .\par            ==\par            .\par            Feb 6, 2015\par            .\par            PCP: Dr. Joe Teixeira \par             Card: Patricia Calle (Hx anginia, elev lipids \par             GI: Dr. Hayes (Hx anemia)\par             Ophthalmology: Santiago Solaresvaldemar [facility] \par             Pod: Dr. Callahan\par            .\par            CC: Diabetes since 2007 (, A1c 11.9 %)\par             (anemia) (L knee replaced - Dr. Lopez at [facility])\par             (Lyme disease)\par             (sleep challenges)\par            .\par            Now teaching genomics.\par            .\par            Lab Jan 2015\par            FBS 99, \par            Urine microalbumin normal\par            HDL 42\par            LDL 64\par            TSH 1.68 Hct 43.7\par            A1c 6.5%\par            .\par            He admits that he exercises a lot and eats properly.\par            .\par            Meds for DM: JanuMet  BID\par            Atovastin 10 mg\par            Amlodipine 5 mg\par            Hyzaar - HCTZ\par            ASA 81 mg/dl\par            Symbacort\par            .\par            Impression: Diabetes well controlled. \par            Tests fingerstick BS about once a week.\par            .\par            Plan: Same Rx and ROV 6 months.\par            .\par            .\par            .\par            .\par            ====\par            Dec 18, 2013\par            PCP: Dr. Teixeira\par            CC: DM since 2007\par            .\par            Summary of history below.\par            Going to [place] as a friend there is quite ill. \par            Says FBS may be 100 - 115. \par            FBS has drifted up a bit.\par            Will consider adding glipizide ER 2.5 mg in PM and\par            continue Janumet 50/500 BID for now.\par            ..\par            ===\par            May 17, 2013\par            PCP: Dr. Joe Teixeira Card: Patricia Calle GI: Dr. Hayes\par             Ophthalmology: [facility] Pod: Dr. Callahan\par            CC: Diabetes (anemia) (L knee replaced - Dr. Lopez at [facility])\par            Tick bite Nov 2012 - Doxy from acupuncturist. \par            .\par            DM Dx'd by Dr. Teixeira 2007 after he developed dizziness and BS over 600 with A1c of 11.9%.\par            Last note appended below. Has seen Dr. Calle - has angina, heart murmur. He is supposed to restart his walking, eliptical, etc. \par            .\par            Remains on Janumet 50/500 BID Recent A1c higher at 6.7%. Urine microalbumin within normal limits. Lipids not part of this panel.\par            .\par            .\par            Did not f/u to GI regarding anemia, but it resolved on Fe. \par            He feels that muscle pain may benefit from re-starting HGH. \par            .\par            .\par            +++++++++\par            # Hx angina - Dr. Calle - on Lipitor\par            # Recent anemia - colonoscopy by Dr. Hayes, declined upper endoscopy, Hct now back to normal\par            # Diabetes: on Janumet 50/500 BID. FBS in good range. A1c 6.1% Feels well. Recentlyu went to [place]\par

## 2021-08-11 ENCOUNTER — APPOINTMENT (OUTPATIENT)
Dept: INTERNAL MEDICINE | Facility: CLINIC | Age: 76
End: 2021-08-11
Payer: COMMERCIAL

## 2021-08-11 PROCEDURE — 36415 COLL VENOUS BLD VENIPUNCTURE: CPT

## 2021-08-16 ENCOUNTER — APPOINTMENT (OUTPATIENT)
Dept: INTERNAL MEDICINE | Facility: CLINIC | Age: 76
End: 2021-08-16
Payer: COMMERCIAL

## 2021-08-16 VITALS
BODY MASS INDEX: 21.47 KG/M2 | HEART RATE: 52 BPM | WEIGHT: 150 LBS | TEMPERATURE: 97 F | OXYGEN SATURATION: 98 % | HEIGHT: 70 IN | DIASTOLIC BLOOD PRESSURE: 80 MMHG | SYSTOLIC BLOOD PRESSURE: 146 MMHG

## 2021-08-16 DIAGNOSIS — Z00.00 ENCOUNTER FOR GENERAL ADULT MEDICAL EXAMINATION W/OUT ABNORMAL FINDINGS: ICD-10-CM

## 2021-08-16 LAB
25(OH)D3 SERPL-MCNC: 58.4 NG/ML
ALBUMIN SERPL ELPH-MCNC: 4.7 G/DL
ALP BLD-CCNC: 61 U/L
ALT SERPL-CCNC: 25 U/L
ANION GAP SERPL CALC-SCNC: 16 MMOL/L
AST SERPL-CCNC: 23 U/L
BASOPHILS # BLD AUTO: 0.04 K/UL
BASOPHILS NFR BLD AUTO: 0.7 %
BILIRUB SERPL-MCNC: 0.5 MG/DL
BUN SERPL-MCNC: 21 MG/DL
CALCIUM SERPL-MCNC: 9.9 MG/DL
CHLORIDE SERPL-SCNC: 103 MMOL/L
CHOLEST SERPL-MCNC: 123 MG/DL
CO2 SERPL-SCNC: 23 MMOL/L
CREAT SERPL-MCNC: 1.09 MG/DL
EOSINOPHIL # BLD AUTO: 0.15 K/UL
EOSINOPHIL NFR BLD AUTO: 2.8 %
ESTIMATED AVERAGE GLUCOSE: 134 MG/DL
GLUCOSE SERPL-MCNC: 76 MG/DL
HBA1C MFR BLD HPLC: 6.3 %
HCT VFR BLD CALC: 44.3 %
HDLC SERPL-MCNC: 43 MG/DL
HGB BLD-MCNC: 13.8 G/DL
IMM GRANULOCYTES NFR BLD AUTO: 0.2 %
LDLC SERPL CALC-MCNC: 70 MG/DL
LYMPHOCYTES # BLD AUTO: 1.71 K/UL
LYMPHOCYTES NFR BLD AUTO: 31.5 %
MAN DIFF?: NORMAL
MCHC RBC-ENTMCNC: 25.8 PG
MCHC RBC-ENTMCNC: 31.2 GM/DL
MCV RBC AUTO: 83 FL
MONOCYTES # BLD AUTO: 0.62 K/UL
MONOCYTES NFR BLD AUTO: 11.4 %
NEUTROPHILS # BLD AUTO: 2.89 K/UL
NEUTROPHILS NFR BLD AUTO: 53.4 %
NONHDLC SERPL-MCNC: 80 MG/DL
PLATELET # BLD AUTO: 208 K/UL
POTASSIUM SERPL-SCNC: 3.8 MMOL/L
PROT SERPL-MCNC: 7.2 G/DL
PSA SERPL-MCNC: 2.59 NG/ML
RBC # BLD: 5.34 M/UL
RBC # FLD: 13.8 %
SODIUM SERPL-SCNC: 142 MMOL/L
TRIGL SERPL-MCNC: 50 MG/DL
TSH SERPL-ACNC: 1.96 UIU/ML
WBC # FLD AUTO: 5.42 K/UL

## 2021-08-16 PROCEDURE — 99397 PER PM REEVAL EST PAT 65+ YR: CPT

## 2021-08-16 NOTE — ASSESSMENT
[FreeTextEntry1] : Will order an x-ray of the lumbosacral spine. If this is nonrevealing we'll consider an MRI of the back as the patient has been having low back pain for 3-4 months. The pain is generally in the low back and does not radiate. In terms of his cardiac status the patient will be going for a cardiac CTA. He will follow up with myself and Dr. Calle. Current medications remain unchanged.

## 2021-08-16 NOTE — PLAN
[FreeTextEntry1] : Patient to call for results of the x-rays. Patient is to let me know what the CT of the heart shows.

## 2021-08-16 NOTE — HISTORY OF PRESENT ILLNESS
[FreeTextEntry1] : Routine yearly checkup. [de-identified] : Patient is doing fairly well without chest pain or shortness of breath. He does not exercise much as he used to. His main complaint is low back pain especially when he gets up in the morning. It seems to get better if he leans over. The pain is worse with walking and standing a lot. He recently underwent a stress test with Dr. Calle and was told that he required a CT angiogram of the heart. Colchicine was also added to his regimen. He denies other chronic complaints. Blood pressure 130/80 pulse 48 and regular. Patient is on diastolic.

## 2021-08-18 ENCOUNTER — APPOINTMENT (OUTPATIENT)
Dept: CARDIOLOGY | Facility: CLINIC | Age: 76
End: 2021-08-18

## 2021-09-03 ENCOUNTER — RESULT REVIEW (OUTPATIENT)
Age: 76
End: 2021-09-03

## 2021-09-03 ENCOUNTER — APPOINTMENT (OUTPATIENT)
Dept: CARDIOLOGY | Facility: CLINIC | Age: 76
End: 2021-09-03
Payer: COMMERCIAL

## 2021-09-03 VITALS
RESPIRATION RATE: 20 BRPM | BODY MASS INDEX: 20.9 KG/M2 | WEIGHT: 146 LBS | OXYGEN SATURATION: 96 % | SYSTOLIC BLOOD PRESSURE: 128 MMHG | DIASTOLIC BLOOD PRESSURE: 68 MMHG | HEIGHT: 70 IN | HEART RATE: 49 BPM | TEMPERATURE: 97 F

## 2021-09-03 PROCEDURE — 93000 ELECTROCARDIOGRAM COMPLETE: CPT

## 2021-09-03 PROCEDURE — 99214 OFFICE O/P EST MOD 30 MIN: CPT

## 2021-09-03 NOTE — HISTORY OF PRESENT ILLNESS
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 CTA was denied.\par \par He denies chest pain, dyspnea, palpitations or syncope.He has dyspnea on exertion with biking.\par He has limited his activity greatly since covid, he was walking 2-3 times a week ," but now is not pushing himself.", partly due to his low back pain ( having an xray today).\par He uses an outdoor bike once a week.He is teaching on-line.

## 2021-09-18 ENCOUNTER — RX RENEWAL (OUTPATIENT)
Age: 76
End: 2021-09-18

## 2021-10-12 ENCOUNTER — APPOINTMENT (OUTPATIENT)
Dept: INTERNAL MEDICINE | Facility: CLINIC | Age: 76
End: 2021-10-12
Payer: COMMERCIAL

## 2021-10-12 VITALS
BODY MASS INDEX: 20.9 KG/M2 | TEMPERATURE: 96.6 F | WEIGHT: 146 LBS | HEIGHT: 70 IN | DIASTOLIC BLOOD PRESSURE: 70 MMHG | SYSTOLIC BLOOD PRESSURE: 110 MMHG | HEART RATE: 57 BPM | OXYGEN SATURATION: 97 %

## 2021-10-12 PROCEDURE — 99213 OFFICE O/P EST LOW 20 MIN: CPT

## 2021-10-12 NOTE — HISTORY OF PRESENT ILLNESS
[FreeTextEntry1] : Chronic low back pain. [de-identified] : Patient's low back pain persists. It has been present for more than 6 months. It is diffuse and in the low back region occasionally radiating to the hips. It is much worse in the morning and gets better as the day goes on. There is no shooting pain down the legs. There is no motor weakness or paresthesias. Patient did have an x-ray of the lumbosacral spine which revealed multilevel spondylolisthesis. Patient took a short course of steroids which helped but only for short period of time. Patient would like to start physical therapy. I would also recommend patient get an MRI to better evaluate his chronic pain syndrome.

## 2021-10-12 NOTE — ASSESSMENT
[FreeTextEntry1] : Patient with chronic low back pain likely due to spondylolisthesis. I will ask patient to start physical therapy. I would also like to obtain an MRI for further evaluation. Thank you

## 2022-01-10 ENCOUNTER — APPOINTMENT (OUTPATIENT)
Dept: ENDOCRINOLOGY | Facility: CLINIC | Age: 77
End: 2022-01-10
Payer: COMMERCIAL

## 2022-01-10 ENCOUNTER — NON-APPOINTMENT (OUTPATIENT)
Age: 77
End: 2022-01-10

## 2022-01-10 VITALS
BODY MASS INDEX: 20.62 KG/M2 | HEIGHT: 70 IN | DIASTOLIC BLOOD PRESSURE: 70 MMHG | SYSTOLIC BLOOD PRESSURE: 120 MMHG | HEART RATE: 54 BPM | OXYGEN SATURATION: 97 % | WEIGHT: 144 LBS

## 2022-01-10 PROCEDURE — 99214 OFFICE O/P EST MOD 30 MIN: CPT | Mod: 25

## 2022-01-10 NOTE — HISTORY OF PRESENT ILLNESS
[FreeTextEntry1] : Karsten 10, 2022    in person\par \par PCP: Dr. Joe Teixeira \par             Card: Patricia Calle (Hx angina, elevated lipids) \par             GI: Dr. Damir Lopez (Hx anemia) - \par             Ophthalmology: Santiago Valles p [phone] f [phone]\par             Pod: Dr. KARIME Callahan - available\par             Ortho - Dr. Dee at  -  [facility]\par            .\par            CC: Diabetes since 2007 (, A1c 11.9 %) ***    3/2019  5.9;  10/2019  5.9  7/20  5.6 %\par             (anemia) \par             (L knee replaced - Dr. Lopez at [facility]) \par             (sleep challenges)\par             (ASHD)\par             ( 6/2018: back pain: scoliosis)\par             (6/2018: hepatic cyst)\par \par Diet controlled diabetes.\par August A1c 6.3\par Back bothers him and he found PT helpful.\par Symbicort helps with breathing.\par \par Impression:  Doing excellent job of controlling diabetes \par Plan:  A1c today\par ROv in six months.  \par \par \par : :\par Constitutional:  Alert, well nourished, healthy appearance, no acute distress \par Eyes:  No proptosis, no stare\par Thyroid:\par Pulmonary:  No respiratory distress, no accessory muscle use; normal rate and effort\par Cardiac:  Normal rate\par Vascular: \par Endocrine:  No stigmata of Cushing’s Syndrome\par Musculoskeletal:  Normal gait, no involuntary movements\par Neurology:  No tremors\par Affect/Mood/Psych:  Oriented x 3; normal affect, normal insight/judgement, normal mood \par .\par \par Impression:   Controlling sugars.\par Endocrine contribution to weight loss not detected thus far..\par \par Plan:  A1c today and ROV in 5-6 months.\par \par \par \par Jul 26, 2021     in person\par \par PCP: Dr. Joe Teixeira \par             Card: Patricia Calle (Hx angina, elevated lipids \par             GI: Dr. Damir Lopez (Hx anemia) - \par             Ophthalmology: Santiago Valles p [phone] f [phone]\par             Pod: Dr. KARIME Callahan - available\par             Ortho - Dr. Dee at [facility]\par            .\par            CC: Diabetes since 2007 (, A1c 11.9 %) ***    3/2019  5.9;  10/2019  5.9  7/20  5.6 %\par             (anemia) \par             (L knee replaced - Dr. 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   When he ran out of Januvia, that was discontinued, so \par currently the diabetes is being  controlled by diet.\par \par Had recent Cologuard test.\par Feels well.\par Brings in fingerstick BS, mostly fasting.\par Fasting sugars tend to run in range of 104 mg/dl\par \par \par : :                5 ft 10, 145 lb    (keeping to seafood and veggies)            \par Constitutional:  Alert, well nourished, healthy appearance, no acute distress \par Eyes:  No proptosis, no stare\par Thyroid:\par Pulmonary:  No respiratory distress, no accessory muscle use; normal rate and effort\par Cardiac:  Normal rate\par Vascular: \par Endocrine:  No stigmata of Cushing’s Syndrome\par Musculoskeletal:  Normal gait, no involuntary movements\par Neurology:  No tremors\par Affect/Mood/Psych:  Oriented x 3; normal affect, normal insight/judgement, normal mood \par .\par \par Impression:  Diabetes appears to be well controlled.\par \par Plan:  Records reviewed.\par A1c today.\par ROV six months.  \par Annual eye exam\par \par \par \par Jan 25, 2021    in person\par \par PCP: Dr. Joe Teixeira \par             Card: Patricia Calle (Hx angina, elevated lipids \par             GI: Dr. Damir Lopez (Hx anemia) - \par             Ophthalmology: Santiago Valles p  f [phone]\par             Pod: Dr. KARIME Callahan - available\par             Ortho - Dr. Dee at  - [facility]\par            .\par            CC: Diabetes since 2007 (, A1c 11.9 %) ***    3/2019  5.9;  10/2019  5.9  7/20  5.6 %\par             (anemia) \par             (L knee replaced - Dr. 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   When he ran out of Januvia, that was discontinued, so \par currently the diabetes is being  controlled by diet.\par \par : :\par Constitutional:  Alert, well nourished, healthy appearance, no acute distress \par Eyes:  No proptosis, no stare\par Thyroid:\par Pulmonary:  No respiratory distress, no accessory muscle use; normal rate and effort\par Cardiac:  Normal rate\par Vascular: \par Endocrine:  No stigmata of Cushing’s Syndrome\par Musculoskeletal:  Normal gait, no involuntary movements\par Neurology:  No tremors\par Affect/Mood/Psych:  Oriented x 3; normal affect, normal insight/judgement, normal mood \par .\par  Impression  Diabetes well controlled by attention to diet. \par \par Plan:  Updated labs today.\par \par \par \par \par Aug 31, 2020    in person\par \par PCP: Dr. Joe Teixeira \par             Card: Patricia Calle (Hx anginia, elev lipids \par             GI: Dr. Hayes (Hx anemia) - colonoscopy last week - [place]\par             Ophthalmology: Santiago Valles p  f [phone]\par             Pod: Dr. KARIME Callahan - available\par             Ortho - Dr. Dee at  -  [facility]\par            .\par            CC: Diabetes since 2007 (, A1c 11.9 %) ***    3/2019  5.9;  10/2019  5.9\par             (anemia) \par             (L knee replaced - Dr. 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 Joe Teixeira \par             Card: Patricia Calle (Hx anginia, elev lipids \par             GI: Dr. Hayes (Hx anemia) - colonoscopy last week - [place]\par             Ophthalmology: Santiago Solarespaul p  f [phone]\par             Pod: Dr. KARIME Callahan - available\par             Ortho - Dr. Dee at [facility]\par            .\par            CC: Diabetes since 2007 (, A1c 11.9 %) ***    3/2019  5.9;  10/2019  5.9\par             (anemia) \par             (L knee replaced - Dr. Lopez at [facility]) \par             (sleep challenges)\par             (ASHD)\par             ( 6/2018: back pain: scoliosis)\par             (6/2018: hepatic cyst)\par \par Remains on JanuMet  BID\par Feels well.\par A1c in March and Oct:  5.9 %\par \par Impression:  Doing well.\par \par Plan:  Updated A1c, BMP.\par See GI to follow up on hepatic cyst\par \par \par \par \par Oct 25, 2019\par \par PCP: Dr. Joe Teixeira \par             Card: Patricia Calle (Hx anginia, elev lipids \par             GI: Dr. Hayes (Hx anemia) - colonoscopy last week - [place]\par             Ophthalmology: Santiago Orlovaldemar p [phone] f [phone]\par             Pod: Dr. KARIME Callahan - available\par             Ortho - Dr. Dee at [facility]\par            .\par            CC: Diabetes since 2007 (, A1c 11.9 %) ***\par             (anemia) \par             (L knee replaced - Dr. 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  Last saw ophthalmology during the summer.\par \par Plan:  Updated A1c today.\par ROV in March or April with a few records.  \par \par \par \par \par March 18, 2019\par \par  PCP: Dr. Joe Teixeira \par             Card: Patricia Calle (Hx anginia, elev lipids \par             GI: Dr. Hayes (Hx anemia) - colonoscopy last week - [place]\par             Ophthalmology: Santiago Valles p  f [phone]\par             Pod: Dr. KARIME Callahan - available\par             Ortho - Dr. Dee at  - [facility]\par            .\par            CC: Diabetes since 2007 (, A1c 11.9 %) ***\par             (anemia) \par             (L knee replaced - Dr. Lopez at [facility]) \par             (sleep challenges)\par             (ASHD)\par             ( 6/2018: back pain: scoliosis)\par             (6/2018: hepatic cyst)\par \par At [place] on\par 3/5/2019\par A1c 5.9\par B12 596\par ferritin 30 ()\par WBC 4.18\par Hct 41.3\par serum iron 103\par \par \par \par \par \par Previous notes from [employer] appended below.\par \par    Sept 17, 2018\par            .\par            PCP: Dr. Joe Teixeira \par             Card: Patricia Calle (Hx anginia, elev lipids \par             GI: Dr. Hayes (Hx anemia) - colonoscopy last week - [place]\par             Ophthalmology: Santiago Valles p  f [phone]\par             Pod: Dr. KARIME Callahan - available\par             Ortho - Dr. Dee at  - [facility]\par            .\par            CC: Diabetes since 2007 (, A1c 11.9 %) ***\par             (anemia) \par             (L knee replaced - Dr. Lopez at [facility]) \par             (sleep challenges)\par             (ASHD)\par             ( 6/2018: back pain: scoliosis)\par             (6/2018: hepatic cyst)\par            .\par            5/16/2018 X-ray R hip: mild bilateral hip arthopathy....arterial vasc. calcifications, \par            .\par            6/20/2018 ER visit for back pain\par            -incidental . hepatic cyist detected\par            .\par            9/7/2018 [facility] included\par            microalbumin - 5\par            Hct 40.3\par            MCV 82.4 \par            A1c 5.8 % \par             m/gdl\par            creatinine 1.12\par            calcium 9.7 *****\par            uric acid 5.3 \par            GGT 24\par            \par            ESR 2\par            PTH 40\par            RF neg \par            immunofix: neg \par            Lyme - neg\par            25 OH vit D 45\par            PSA 2.1 \par            1,25 di OH vit D 47 (18-72)\par            ionized calcium normal\par            .\par            Impression: He is on ferrous gluconate for anemia and he would like to know his ferritin and Fe/TIBC.\par            .\par            Eye exam was excellent. .\par            .\par            ==\par            .\par            May 16, 2018\par            .\par            PCP: Dr. Joe Teixeira \par             Card: Patricia Calle (Hx anginia, elev lipids \par             GI: Dr. Hayes (Hx anemia) - colonoscopy last week - [place]\par             Ophthalmology: Santiago Valles p  f [phone]\par             Pod: Dr. KARIME Callahan - available\par             Ortho - Dr. Dee at [facility]\par            .\par            CC: Diabetes since 2007 (, A1c 11.9 %) ***\par             (anemia) \par             (L knee replaced - Dr. 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 \par            .\par            Impression: Feeling well.\par            Slightly elevated [facility] calcium. \par            A1c in good range.\par            .\par            Plan: Same Rx. \par            .\par            ==\par            .\par            January 16, 2018\par            .\par            PCP: Dr. Joe Teixeira \par             Card: Patricia Calle (Hx anginia, elev lipids \par             GI: Dr. Hayes (Hx anemia) - colonoscopy last week - [place]\par             Ophthalmology: Santiago Avinavaldemar p  f [phone]\par             Pod: Dr. KARIME Callahan - available\par             Ortho - Dr. Dee at [facility]\par            .\par            CC: Diabetes since 2007 (, A1c 11.9 %) ***\par             (anemia) \par             (L knee replaced - Dr. Lopez at [facility]) \par             (sleep challenges)\par             (ASHD)\par            .\par            Reports he saw ophthalmology a few months ago.\par            Currently has cough - saw Dr. Teixeira.\par            Has funny feeling in toes. \par            .\par            Impresion: He appears to be doing great !\par            .\par            ==\par            .\par            August 8, 2017\par            .\par            PCP: Dr. Joe Teixeira \par             Card: Patricia Calle (Hx anginia, elev lipids \par             GI: Dr. Hayes (Hx anemia) - colonoscopy last week - [place]\par             Ophthalmology: Santiago Valles [facility] tomorrow\par             Pod: Dr. KARIME Callahan - available\par             Ortho - Dr. Dee at [facility]\par            .\par            CC: Diabetes since 2007 (, A1c 11.9 %) ***\par             (anemia) \par             (L knee replaced - Dr. Lopez at [facility]) \par             (sleep challenges)\par             (ASHD)\par            .\par            Had colonoscopy about a year ago by Dr. Hayes.\par            Declined upper endoscopy.\par            Took ferrous gluconate.\par            Recent labs at [facility] on\par            7/25/2017 included\par            BS 98\par            TSH 2.29 \par            Hct 42.7\par            MCV 79.7\par            A1c 5.9\par            .\par            .\par            Impression: Diabetes under excellent control based on his own fingerstick sugars as well as recent A1c of 5.9%\par            .\par            Plan: He will see Dr. Teixeira very soon.\par            ROV in about six months. Thank you. -jh\par            .\par            ==\par            .\par            Feb 8, 2017\par            .\par            PCP: Dr. Joe Teixeira \par             Card: Patricia Calle (Hx anginia, elev lipids \par             GI: Dr. Hayes (Hx anemia) - colonoscopy last week - [place]\par             Ophthalmology: [facility] tomorrow\par             Pod: Dr. KARIME Callahan - available\par             Ortho - Dr. Dee at [facility]\par            .\par            CC: Diabetes since 2007 (, A1c 11.9 %) ***\par             (anemia) \par             (L knee replaced - Dr. Lopez at [facility]) \par             (sleep challenges)\par             (ASHD)\par            .\par            On Bystolic and\par            JanuMet  BID for diabetes.\par            .\par            Recent [facility] A1c 6.2 %\par            urine microalbu 0.6 \par            .\par            Impression: Doing very well.\par            .\par            Plan: Reviewed fingerstick BS and guidelines.\par            Need for annual eye exam. \par            .\par            ==\par            .\par            August 8, 2016\par            .\par            PCP: Dr. Joe Teixeira \par             Card: Patricia Calle (Hx anginia, elev lipids \par             GI: Dr. Hayes (Hx anemia) - colonoscopy last week - [place]\par             Ophthalmology: [facility] tomorrow\par             Pod: Dr. KARIME Callahan - available\par             Ortho - Dr. Dee at [facility]\par            .\par            CC: Diabetes since 2007 (, A1c 11.9 %) ***\par             (anemia) \par             (L knee replaced - Dr. Lopez at [facility]) \par             (sleep challenges)\par             (ASHD)\par            .\par            Recent labs ([facility]) show\par             mg/dl\par            B12 461 \par            HbA1c 6.3 %\par            .\par            Impression: Doing excellent job of controlling sugars.\par            .\par            Plan: Same Rx: JanuMet, diet, exercise. \par            .\par            ==\par            .\par            February 15, 2016\par            .\par            PCP: Dr. Joe Teixeira \par             Card: Patricia Calle (Hx anginia, elev lipids \par             GI: Dr. Hayes (Hx anemia)\par             Ophthalmology: Santiago Orloff [place] good report\par             Pod: Dr. KARIME Callahan \par            .\par            CC: Diabetes since 2007 (, A1c 11.9 %) ***\par             (anemia) \par             (L knee replaced - Dr. Lopez at [facility]) \par             (sleep challenges)\par             (ASHD)\par            .\par            Had recent evaluation for possible angina.\par            Studies turned out well.\par            Bystolic and isosorbide were prescribe.\par            Plans trip to [facility].\par            .\par            Eyes last checked a few months ago and was given a good report. \par            .\par            JanuMet  BID [facility] 60 days at a time\par            .\par            A1c 6.2 % (up from 6.0%)\par            .\par            Impression: Doing very well.\par            .\par            Plan: Restart fingerstick BS monitoring. \par            Updated A1c, B12, lipids in 4 months. \par            .\par            .\par            ==\par            .\par            August 19, 2015\par            .\par            PCP: Dr. Joe Teixeira \par             Card: Patricia Calle (Hx anginia, elev lipids \par             GI: Dr. Hayes (Hx anemia)\par             Ophthalmology: Santiago Solaresvaldemar [place] good report\par             Pod: Dr. KARIME Callahan \par            .\par            CC: Diabetes since 2007 (, A1c 11.9 %) ***\par             (anemia) (L knee replaced - Dr. Lopez at [facility])\par             (sleep challenges)\par            .\par            Has an autographed copy of text by Bola Tim\par            Recent FBS 78 mg/dl\par            HbA1c 6.0%\par            .\par            Takes JanuMet  BID\par            .\par            Impression: Doing well. Notes some stomach rumbling so I will advise trial of decreasing the JanuMet to daily and repeating labs in about six months.\par            .\par            .\par            Plan: Same Rx and ROV six months. \par            .\par            ==\par            .\par            Feb 6, 2015\par            .\par            PCP: Dr. Joe Teixeira \par             Card: Patricia Calle (Hx anginia, elev lipids \par             GI: Dr. Hayes (Hx anemia)\par             Ophthalmology: Santiago Valles [facility] \par             Pod: Dr. Callahan\par            .\par            CC: Diabetes since 2007 (, A1c 11.9 %)\par             (anemia) (L knee replaced - Dr. Lopez at [facility])\par             (Lyme disease)\par             (sleep challenges)\par            .\par            Now teaching genomics.\par            .\par            Lab Jan 2015\par            FBS 99, \par            Urine microalbumin normal\par            HDL 42\par            LDL 64\par            TSH 1.68 Hct 43.7\par            A1c 6.5%\par            .\par            He admits that he exercises a lot and eats properly.\par            .\par            Meds for DM: JanuMet  BID\par            Atovastin 10 mg\par            Amlodipine 5 mg\par            Hyzaar - HCTZ\par            ASA 81 mg/dl\par            Symbacort\par            .\par            Impression: Diabetes well controlled. \par            Tests fingerstick BS about once a week.\par            .\par            Plan: Same Rx and ROV 6 months.\par            .\par            .\par            .\par            .\par            ====\par            Dec 18, 2013\par            PCP: Dr. Teixeira\par            CC: DM since 2007\par            .\par            Summary of history below.\par            Going to [place] as a friend there is quite ill. \par            Says FBS may be 100 - 115. \par            FBS has drifted up a bit.\par            Will consider adding glipizide ER 2.5 mg in PM and\par            continue Janumet 50/500 BID for now.\par            ..\par            ===\par            May 17, 2013\par            PCP: Dr. Joe Teixeira Card: Patricia Calle GI: Dr. Hayes\par             Ophthalmology: Santiago Valles [facility] Pod: Dr. Callahan\par            CC: Diabetes (anemia) (L knee replaced - Dr. Lopez at [facility])\par            Tick bite Nov 2012 - Doxy from acupuncturist. \par            .\par            DM Dx'd by Dr. Teixeira 2007 after he developed dizziness and BS over 600 with A1c of 11.9%.\par            Last note appended below. Has seen Dr. Calle - has angina, heart murmur. He is supposed to restart his walking, eliptical, etc. \par            .\par            Remains on Janumet 50/500 BID Recent A1c higher at 6.7%. Urine microalbumin within normal limits. Lipids not part of this panel.\par            .\par            .\par            Did not f/u to GI regarding anemia, but it resolved on Fe. \par            He feels that muscle pain may benefit from re-starting HGH. \par            .\par            .\par            +++++++++\par            # Hx angina - Dr. Calle - on Lipitor\par            # Recent anemia - colonoscopy by Dr. Hayes, declined upper endoscopy, Hct now back to normal\par            # Diabetes: on Janumet 50/500 BID. FBS in good range. A1c 6.1% Feels well. Recentlyu went to [place]. February 15, 2016\par            .\par            PCP: Dr. Joe Teixeira \par             Card: Patricia Calle (Hx anginia, elev lipids \par             GI: Dr. Hayes (Hx anemia)\par             [facility] [place] [facility]\par             Pod: Dr. KARIME Callahan \par            .\par            CC: Diabetes since 2007 (, A1c 11.9 %) ***\par             (anemia) \par             (L knee replaced - Dr. Lopez at [facility]) \par             (sleep challenges)\par             (ASHD)\par            .\par            Had recent evaluation for possible angina.\par            Studies turned out well.\par            Bystolic and isosorbide were prescribe.\par            Plans trip to [facility].\par            .\par            Eyes last checked a few months ago and was given a good report. \par            .\par            JanuMet  BID [facility] 60 days at a time\par            .\par            A1c 6.2 % (up from 6.0%)\par            .\par            Impression: Doing very well.\par            .\par            Plan: Restart fingerstick BS monitoring. \par            Updated A1c, B12, lipids in 4 months. \par            .\par            .\par            ==\par            .\par            August 19, 2015\par            .\par            PCP: Dr. Joe Teixeira \par             Card: Patricia Calle (Hx anginia, elev lipids \par             GI: Dr. Hayes (Hx anemia)\par             [facility] [place] [facility]\par             Pod: Dr. KARIME Callahan \par            .\par            CC: Diabetes since 2007 (, A1c 11.9 %) ***\par             (anemia) (L knee replaced - Dr. Lopez at [facility])\par             (sleep challenges)\par            .\par            Has an autographed copy of text by Bola Tim\par            Recent FBS 78 mg/dl\par            HbA1c 6.0%\par            .\par            Takes JanuMet  BID\par            .\par            Impression: Doing well. Notes some stomach rumbling so I will advise trial of decreasing the JanuMet to daily and repeating labs in about six months.\par            .\par            .\par            Plan: Same Rx and ROV six months. \par            .\par            ==\par            .\par            Feb 6, 2015\par            .\par            PCP: Dr. Joe Teixeira \par             Card: Patricia Calle (Hx anginia, elev lipids \par             GI: Dr. Hayes (Hx anemia)\par             Ophthalmology: Santiago Valles [facility] \par             Pod: Dr. Callahan\par            .\par            CC: Diabetes since 2007 (, A1c 11.9 %)\par             (anemia) (L knee replaced - Dr. Lopez at [facility])\par             (Lyme disease)\par             (sleep challenges)\par            .\par            Now teaching genomics.\par            .\par            Lab Jan 2015\par            FBS 99, \par            Urine microalbumin normal\par            HDL 42\par            LDL 64\par            TSH 1.68 Hct 43.7\par            A1c 6.5%\par            .\par            He admits that he exercises a lot and eats properly.\par            .\par            Meds for DM: JanuMet  BID\par            Atovastin 10 mg\par            Amlodipine 5 mg\par            Hyzaar - HCTZ\par            ASA 81 mg/dl\par            Symbacort\par            .\par            Impression: Diabetes well controlled. \par            Tests fingerstick BS about once a week.\par            .\par            Plan: Same Rx and ROV 6 months.\par            .\par            .\par            .\par            .\par            ====\par            Dec 18, 2013\par            PCP: Dr. Teixeira\par            CC: DM since 2007\par            .\par            Summary of history below.\par            Going to [place] as a friend there is quite ill. \par            Says FBS may be 100 - 115. \par            FBS has drifted up a bit.\par            Will consider adding glipizide ER 2.5 mg in PM and\par            continue Janumet 50/500 BID for now.\par            ..\par            ===\par            May 17, 2013\par            PCP: Dr. Joe Teixeira Card: Patricia Calle GI: Dr. Hayes\par             Ophthalmology: [facility] Pod: Dr. Callahan\par            CC: Diabetes (anemia) (L knee replaced - Dr. Lopez at [facility])\par            Tick bite Nov 2012 - Doxy from acupuncturist. \par            .\par            DM Dx'd by Dr. Teixeira 2007 after he developed dizziness and BS over 600 with A1c of 11.9%.\par            Last note appended below. Has seen Dr. Calle - has angina, heart murmur. He is supposed to restart his walking, eliptical, etc. \par            .\par            Remains on Janumet 50/500 BID Recent A1c higher at 6.7%. Urine microalbumin within normal limits. Lipids not part of this panel.\par            .\par            .\par            Did not f/u to GI regarding anemia, but it resolved on Fe. \par            He feels that muscle pain may benefit from re-starting HGH. \par            .\par            .\par            +++++++++\par            # Hx angina - Dr. Calle - on Lipitor\par            # Recent anemia - colonoscopy by Dr. Hayes, declined upper endoscopy, Hct now back to normal\par            # Diabetes: on Janumet 50/500 BID. FBS in good range. A1c 6.1% Feels well. Recentlyu went to [place]. February 15, 2016\par            .\par            PCP: Dr. Joe Teixeira \par             Card: Patricia Calle (Hx anginia, elev lipids \par             GI: Dr. Hayes (Hx anemia)\par             Ophthalmology: [facility] [place] good report\par             Pod: Dr. KARIME Callahan \par            .\par            CC: Diabetes since 2007 (, A1c 11.9 %) ***\par             (anemia) \par             (L knee replaced - Dr. Lopez at [facility]) \par             (sleep challenges)\par             (ASHD)\par            .\par            Had recent evaluation for possible angina.\par            Studies turned out well.\par            Bystolic and isosorbide were prescribe.\par            Plans trip to [facility].\par            .\par            Eyes last checked a few months ago and was given a good report. \par            .\par            JanuMet  BID [facility] 60 days at a time\par            .\par            A1c 6.2 % (up from 6.0%)\par            .\par            Impression: Doing very well.\par            .\par            Plan: Restart fingerstick BS monitoring. \par            Updated A1c, B12, lipids in 4 months. \par            .\par            .\par            ==\par            .\par            August 19, 2015\par            .\par            PCP: Dr. Joe Teixeira \par             Card: Patricia Calle (Hx anginia, elev lipids \par             GI: Dr. Hayes (Hx anemia)\par             Ophthalmology: [facility] [place] good report\par             Pod: Dr. KARIME Callahan \par            .\par            CC: Diabetes since 2007 (, A1c 11.9 %) ***\par             (anemia) (L knee replaced - Dr. Lopez at [facility])\par             (sleep challenges)\par            .\par            Has an autographed copy of text by Bola Tim\par            Recent FBS 78 mg/dl\par            HbA1c 6.0%\par            .\par            Takes JanuMet  BID\par            .\par            Impression: Doing well. Notes some stomach rumbling so I will advise trial of decreasing the JanuMet to daily and repeating labs in about six months.\par            .\par            .\par            Plan: Same Rx and ROV six months. \par            .\par            ==\par            .\par            Feb 6, 2015\par            .\par            PCP: Dr. Joe Teixeira \par             Card: Patricia Calle (Hx anginia, elev lipids \par             GI: Dr. Hayes (Hx anemia)\par             Ophthalmology: [facility] \par             Pod: Dr. Callahan\par            .\par            CC: Diabetes since 2007 (, A1c 11.9 %)\par             (anemia) (L knee replaced - Dr. Lopez at [facility])\par             (Lyme disease)\par             (sleep challenges)\par            .\par            Now teaching genomics.\par            .\par            Lab Jan 2015\par            FBS 99, \par            Urine microalbumin normal\par            HDL 42\par            LDL 64\par            TSH 1.68 Hct 43.7\par            A1c 6.5%\par            .\par            He admits that he exercises a lot and eats properly.\par            .\par            Meds for DM: JanuMet  BID\par            Atovastin 10 mg\par            Amlodipine 5 mg\par            Hyzaar - HCTZ\par            ASA 81 mg/dl\par            Symbacort\par            .\par            Impression: Diabetes well controlled. \par            Tests fingerstick BS about once a week.\par            .\par            Plan: Same Rx and ROV 6 months.\par            .\par            .\par            .\par            .\par            ====\par            Dec 18, 2013\par            PCP: Dr. Teixeira\par            CC: DM since 2007\par            .\par            Summary of history below.\par            Going to [place] as a friend there is quite ill. \par            Says FBS may be 100 - 115. \par            FBS has drifted up a bit.\par            Will consider adding glipizide ER 2.5 mg in PM and\par            continue Janumet 50/500 BID for now.\par            ..\par            ===\par            May 17, 2013\par            PCP: Dr. Joe Teixeira Card: Patricia Calle GI: Dr. Hayes\par             Ophthalmology: [facility] Pod: Dr. Callahan\par            CC: Diabetes (anemia) (L knee replaced - Dr. Lopez at [facility])\par            Tick bite Nov 2012 - Doxy from acupuncturist. \par            .\par            DM Dx'd by Dr. Teixeira 2007 after he developed dizziness and BS over 600 with A1c of 11.9%.\par            Last note appended below. Has seen Dr. Calle - has angina, heart murmur. He is supposed to restart his walking, eliptical, etc. \par            .\par            Remains on Janumet 50/500 BID Recent A1c higher at 6.7%. Urine microalbumin within normal limits. Lipids not part of this panel.\par            .\par            .\par            Did not f/u to GI regarding anemia, but it resolved on Fe. \par            He feels that muscle pain may benefit from re-starting HGH. \par            .\par            .\par            +++++++++\par            # Hx angina - Dr. Calle - on Lipitor\par            # Recent anemia - colonoscopy by Dr. Hayes, declined upper endoscopy, Hct now back to normal\par            # Diabetes: on Janumet 50/500 BID. FBS in good range. A1c 6.1% Feels well. Recentlyu went to [place]\par

## 2022-01-21 LAB
ALBUMIN MFR SERPL ELPH: 59.8 %
ALBUMIN SERPL ELPH-MCNC: 4.1 G/DL
ALBUMIN SERPL-MCNC: 3.7 G/DL
ALBUMIN/GLOB SERPL: 1.5 RATIO
ALP BLD-CCNC: 53 U/L
ALPHA1 GLOB MFR SERPL ELPH: 4.3 %
ALPHA1 GLOB SERPL ELPH-MCNC: 0.3 G/DL
ALPHA2 GLOB MFR SERPL ELPH: 11.6 %
ALPHA2 GLOB SERPL ELPH-MCNC: 0.7 G/DL
ALT SERPL-CCNC: 25 U/L
ANION GAP SERPL CALC-SCNC: 13 MMOL/L
ANION GAP SERPL CALC-SCNC: 13 MMOL/L
AST SERPL-CCNC: 21 U/L
B-GLOBULIN MFR SERPL ELPH: 11.2 %
B-GLOBULIN SERPL ELPH-MCNC: 0.7 G/DL
BILIRUB DIRECT SERPL-MCNC: 0.2 MG/DL
BILIRUB INDIRECT SERPL-MCNC: 0.3 MG/DL
BILIRUB SERPL-MCNC: 0.4 MG/DL
BUN SERPL-MCNC: 18 MG/DL
BUN SERPL-MCNC: 25 MG/DL
C PEPTIDE SERPL-MCNC: 5.9 NG/ML
CALCIUM SERPL-MCNC: 9.3 MG/DL
CALCIUM SERPL-MCNC: 9.6 MG/DL
CHLORIDE SERPL-SCNC: 102 MMOL/L
CHLORIDE SERPL-SCNC: 99 MMOL/L
CK SERPL-CCNC: 165 U/L
CO2 SERPL-SCNC: 26 MMOL/L
CO2 SERPL-SCNC: 28 MMOL/L
CORTIS SERPL-MCNC: 11.3 UG/DL
CREAT SERPL-MCNC: 1.09 MG/DL
CREAT SERPL-MCNC: 1.13 MG/DL
CREAT SPEC-SCNC: 113 MG/DL
DHEA-SULFATE, SERUM: 60 UG/DL
ERYTHROCYTE [SEDIMENTATION RATE] IN BLOOD BY WESTERGREN METHOD: 12 MM/HR
ESTIMATED AVERAGE GLUCOSE: 131 MG/DL
ESTIMATED AVERAGE GLUCOSE: 134 MG/DL
FOLATE SERPL-MCNC: >20 NG/ML
GAMMA GLOB FLD ELPH-MCNC: 0.8 G/DL
GAMMA GLOB MFR SERPL ELPH: 13.1 %
GLUCOSE SERPL-MCNC: 145 MG/DL
GLUCOSE SERPL-MCNC: 174 MG/DL
HBA1C MFR BLD HPLC: 6.2 %
HBA1C MFR BLD HPLC: 6.3 %
INTERPRETATION SERPL IEP-IMP: NORMAL
LDH SERPL-CCNC: 160 U/L
MICROALBUMIN 24H UR DL<=1MG/L-MCNC: <1.2 MG/DL
MICROALBUMIN/CREAT 24H UR-RTO: NORMAL MG/G
POTASSIUM SERPL-SCNC: 3.4 MMOL/L
POTASSIUM SERPL-SCNC: 3.7 MMOL/L
PROLACTIN SERPL-MCNC: 8.5 NG/ML
PROT SERPL-MCNC: 6.2 G/DL
SODIUM SERPL-SCNC: 139 MMOL/L
SODIUM SERPL-SCNC: 141 MMOL/L
T4 SERPL-MCNC: 6.6 UG/DL
TSH SERPL-ACNC: 2.19 UIU/ML
VIT B12 SERPL-MCNC: 667 PG/ML

## 2022-01-23 ENCOUNTER — NON-APPOINTMENT (OUTPATIENT)
Age: 77
End: 2022-01-23

## 2022-01-23 DIAGNOSIS — M43.17 SPONDYLOLISTHESIS, LUMBOSACRAL REGION: ICD-10-CM

## 2022-01-24 ENCOUNTER — APPOINTMENT (OUTPATIENT)
Dept: CARDIOLOGY | Facility: CLINIC | Age: 77
End: 2022-01-24
Payer: COMMERCIAL

## 2022-01-24 VITALS
TEMPERATURE: 96 F | HEIGHT: 70 IN | DIASTOLIC BLOOD PRESSURE: 70 MMHG | BODY MASS INDEX: 21.9 KG/M2 | SYSTOLIC BLOOD PRESSURE: 120 MMHG | WEIGHT: 153 LBS

## 2022-01-24 PROCEDURE — 93000 ELECTROCARDIOGRAM COMPLETE: CPT

## 2022-01-24 PROCEDURE — 99214 OFFICE O/P EST MOD 30 MIN: CPT

## 2022-01-24 PROCEDURE — 36415 COLL VENOUS BLD VENIPUNCTURE: CPT

## 2022-01-24 NOTE — HISTORY OF PRESENT ILLNESS
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 CTA was denied.\par \par He denies chest pain, dyspnea, palpitations or syncope\par \par He goes to  for his back and does some aerobics there twice a week and goes to the gym twice a wee,  without difficulty.

## 2022-02-23 ENCOUNTER — APPOINTMENT (OUTPATIENT)
Dept: CARDIOLOGY | Facility: CLINIC | Age: 77
End: 2022-02-23
Payer: COMMERCIAL

## 2022-02-23 PROCEDURE — 36415 COLL VENOUS BLD VENIPUNCTURE: CPT

## 2022-02-24 ENCOUNTER — TRANSCRIPTION ENCOUNTER (OUTPATIENT)
Age: 77
End: 2022-02-24

## 2022-02-24 LAB
ALBUMIN SERPL ELPH-MCNC: 4.5 G/DL
ALP BLD-CCNC: 54 U/L
ALT SERPL-CCNC: 23 U/L
ANION GAP SERPL CALC-SCNC: 12 MMOL/L
AST SERPL-CCNC: 23 U/L
BILIRUB SERPL-MCNC: 0.2 MG/DL
BUN SERPL-MCNC: 21 MG/DL
CALCIUM SERPL-MCNC: 9.6 MG/DL
CHLORIDE SERPL-SCNC: 109 MMOL/L
CHOLEST SERPL-MCNC: 110 MG/DL
CO2 SERPL-SCNC: 27 MMOL/L
CREAT SERPL-MCNC: 1.08 MG/DL
GLUCOSE SERPL-MCNC: 115 MG/DL
HDLC SERPL-MCNC: 39 MG/DL
LDLC SERPL CALC-MCNC: 64 MG/DL
NONHDLC SERPL-MCNC: 70 MG/DL
POTASSIUM SERPL-SCNC: 3.7 MMOL/L
PROT SERPL-MCNC: 6.8 G/DL
SODIUM SERPL-SCNC: 148 MMOL/L
TRIGL SERPL-MCNC: 32 MG/DL

## 2022-06-07 ENCOUNTER — RX RENEWAL (OUTPATIENT)
Age: 77
End: 2022-06-07

## 2022-07-08 ENCOUNTER — APPOINTMENT (OUTPATIENT)
Dept: INTERNAL MEDICINE | Facility: CLINIC | Age: 77
End: 2022-07-08

## 2022-07-08 VITALS
OXYGEN SATURATION: 98 % | SYSTOLIC BLOOD PRESSURE: 120 MMHG | HEART RATE: 54 BPM | TEMPERATURE: 96.4 F | WEIGHT: 144 LBS | BODY MASS INDEX: 20.62 KG/M2 | HEIGHT: 70 IN | DIASTOLIC BLOOD PRESSURE: 80 MMHG

## 2022-07-08 PROCEDURE — 99213 OFFICE O/P EST LOW 20 MIN: CPT

## 2022-07-08 NOTE — HISTORY OF PRESENT ILLNESS
[FreeTextEntry1] : Left eye irritation x2 weeks appear [de-identified] : Left eye appears itchy and irritated for the past 2 weeks. There is no drainage in the eye. There is no history of allergic disease.

## 2022-07-08 NOTE — ASSESSMENT
[FreeTextEntry1] : I don't see any abnormality on examination of the eye. I will refer  to ophthalmology.

## 2022-07-12 ENCOUNTER — NON-APPOINTMENT (OUTPATIENT)
Age: 77
End: 2022-07-12

## 2022-07-13 ENCOUNTER — APPOINTMENT (OUTPATIENT)
Dept: CARDIOLOGY | Facility: CLINIC | Age: 77
End: 2022-07-13

## 2022-07-13 ENCOUNTER — NON-APPOINTMENT (OUTPATIENT)
Age: 77
End: 2022-07-13

## 2022-07-13 VITALS
OXYGEN SATURATION: 94 % | TEMPERATURE: 98 F | BODY MASS INDEX: 21.33 KG/M2 | WEIGHT: 149 LBS | HEIGHT: 70 IN | HEART RATE: 56 BPM | SYSTOLIC BLOOD PRESSURE: 115 MMHG | DIASTOLIC BLOOD PRESSURE: 60 MMHG

## 2022-07-13 PROCEDURE — 93000 ELECTROCARDIOGRAM COMPLETE: CPT

## 2022-07-13 PROCEDURE — 99214 OFFICE O/P EST MOD 30 MIN: CPT

## 2022-07-13 RX ORDER — OMEPRAZOLE MAGNESIUM 20 MG
45 CAPSULE,DELAYED RELEASE (ENTERIC COATED) ORAL
Refills: 0 | Status: DISCONTINUED | COMMUNITY
Start: 2022-01-24 | End: 2022-07-13

## 2022-07-13 NOTE — HISTORY OF PRESENT ILLNESS
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 CTA was denied.\par \par There have been no hospitalizations since last visit.\par He was taken off his diabetic medications by Dr Hellerman, now on diet and exercise alone. The opthalmologist has asked him to readdress this. \par \par \par He denies chest pain, dyspnea, palpitations or syncope\par He does his own PT at the gym 2 -3 times a week and an elliptical.\par He has had LBP.

## 2022-08-15 ENCOUNTER — TRANSCRIPTION ENCOUNTER (OUTPATIENT)
Age: 77
End: 2022-08-15

## 2022-08-15 ENCOUNTER — APPOINTMENT (OUTPATIENT)
Dept: CARDIOLOGY | Facility: CLINIC | Age: 77
End: 2022-08-15

## 2022-08-15 PROCEDURE — 93306 TTE W/DOPPLER COMPLETE: CPT

## 2022-08-25 ENCOUNTER — APPOINTMENT (OUTPATIENT)
Dept: INTERNAL MEDICINE | Facility: CLINIC | Age: 77
End: 2022-08-25

## 2022-08-25 VITALS
BODY MASS INDEX: 20.62 KG/M2 | OXYGEN SATURATION: 98 % | DIASTOLIC BLOOD PRESSURE: 70 MMHG | HEIGHT: 70 IN | HEART RATE: 60 BPM | TEMPERATURE: 96.7 F | WEIGHT: 144 LBS | SYSTOLIC BLOOD PRESSURE: 120 MMHG

## 2022-08-25 PROCEDURE — 99214 OFFICE O/P EST MOD 30 MIN: CPT | Mod: 25

## 2022-08-25 PROCEDURE — 36415 COLL VENOUS BLD VENIPUNCTURE: CPT

## 2022-08-25 RX ORDER — SILDENAFIL 50 MG/1
50 TABLET ORAL
Qty: 18 | Refills: 3 | Status: ACTIVE | COMMUNITY
Start: 2022-08-25 | End: 1900-01-01

## 2022-08-25 NOTE — HISTORY OF PRESENT ILLNESS
[FreeTextEntry1] : Medical followup. [de-identified] : Patient is generally feeling well without chest pain shortness of breath. He is concerned about loud snoring. He has a long history of sleep apnea and is unable to use CPAP. He had a UPPP procedure more than 20 years ago. His last sleep study was at least 5 years ago. We do not have the result. He asked if he would be a candidate for inspire. His diabetes medication was stopped several months ago. He has not had followup labs. He is followed by Dr. Calle of cardiology. He wakes up twice per night during the night and may take a while to go back to sleep. He does not have excessive daytime sleepiness.

## 2022-08-25 NOTE — PHYSICAL EXAM
[No Acute Distress] : no acute distress [Well Developed] : well developed [Well-Appearing] : well-appearing [Normal Sclera/Conjunctiva] : normal sclera/conjunctiva [PERRL] : pupils equal round and reactive to light [EOMI] : extraocular movements intact [Normal Outer Ear/Nose] : the outer ears and nose were normal in appearance [Normal Oropharynx] : the oropharynx was normal [No JVD] : no jugular venous distention [No Lymphadenopathy] : no lymphadenopathy [Supple] : supple [Thyroid Normal, No Nodules] : the thyroid was normal and there were no nodules present [No Respiratory Distress] : no respiratory distress  [No Accessory Muscle Use] : no accessory muscle use [Clear to Auscultation] : lungs were clear to auscultation bilaterally [Normal Rate] : normal rate  [Regular Rhythm] : with a regular rhythm [Normal S1, S2] : normal S1 and S2 [No Murmur] : no murmur heard [No Carotid Bruits] : no carotid bruits [No Abdominal Bruit] : a ~M bruit was not heard ~T in the abdomen [No Varicosities] : no varicosities [Pedal Pulses Present] : the pedal pulses are present [No Edema] : there was no peripheral edema [No Palpable Aorta] : no palpable aorta [No Extremity Clubbing/Cyanosis] : no extremity clubbing/cyanosis [Soft] : abdomen soft [Non Tender] : non-tender [Non-distended] : non-distended [No Masses] : no abdominal mass palpated [No HSM] : no HSM [Normal Bowel Sounds] : normal bowel sounds [Normal Posterior Cervical Nodes] : no posterior cervical lymphadenopathy [Normal Anterior Cervical Nodes] : no anterior cervical lymphadenopathy [No Focal Deficits] : no focal deficits [Normal Gait] : normal gait [Normal Affect] : the affect was normal [Normal Insight/Judgement] : insight and judgment were intact [de-identified] : thin

## 2022-08-26 LAB
25(OH)D3 SERPL-MCNC: 51 NG/ML
ALBUMIN SERPL ELPH-MCNC: 4.6 G/DL
ALP BLD-CCNC: 59 U/L
ALT SERPL-CCNC: 33 U/L
ANION GAP SERPL CALC-SCNC: 13 MMOL/L
AST SERPL-CCNC: 25 U/L
BASOPHILS # BLD AUTO: 0.03 K/UL
BASOPHILS NFR BLD AUTO: 0.6 %
BILIRUB SERPL-MCNC: 0.4 MG/DL
BUN SERPL-MCNC: 22 MG/DL
CALCIUM SERPL-MCNC: 10.3 MG/DL
CHLORIDE SERPL-SCNC: 102 MMOL/L
CHOLEST SERPL-MCNC: 108 MG/DL
CO2 SERPL-SCNC: 30 MMOL/L
CREAT SERPL-MCNC: 1.12 MG/DL
EGFR: 67 ML/MIN/1.73M2
EOSINOPHIL # BLD AUTO: 0.18 K/UL
EOSINOPHIL NFR BLD AUTO: 3.3 %
ESTIMATED AVERAGE GLUCOSE: 146 MG/DL
GLUCOSE SERPL-MCNC: 151 MG/DL
HBA1C MFR BLD HPLC: 6.7 %
HCT VFR BLD CALC: 43.9 %
HDLC SERPL-MCNC: 41 MG/DL
HGB BLD-MCNC: 13.7 G/DL
IMM GRANULOCYTES NFR BLD AUTO: 0.2 %
LDLC SERPL CALC-MCNC: 46 MG/DL
LYMPHOCYTES # BLD AUTO: 1.2 K/UL
LYMPHOCYTES NFR BLD AUTO: 22.3 %
MAN DIFF?: NORMAL
MCHC RBC-ENTMCNC: 25.8 PG
MCHC RBC-ENTMCNC: 31.2 GM/DL
MCV RBC AUTO: 82.8 FL
MONOCYTES # BLD AUTO: 0.46 K/UL
MONOCYTES NFR BLD AUTO: 8.6 %
NEUTROPHILS # BLD AUTO: 3.5 K/UL
NEUTROPHILS NFR BLD AUTO: 65 %
NONHDLC SERPL-MCNC: 67 MG/DL
PLATELET # BLD AUTO: 162 K/UL
POTASSIUM SERPL-SCNC: 3.6 MMOL/L
PROT SERPL-MCNC: 6.9 G/DL
PSA SERPL-MCNC: 2.03 NG/ML
RBC # BLD: 5.3 M/UL
RBC # FLD: 14.6 %
SODIUM SERPL-SCNC: 145 MMOL/L
TRIGL SERPL-MCNC: 103 MG/DL
TSH SERPL-ACNC: 1.64 UIU/ML
WBC # FLD AUTO: 5.38 K/UL

## 2022-09-21 ENCOUNTER — APPOINTMENT (OUTPATIENT)
Dept: ENDOCRINOLOGY | Facility: CLINIC | Age: 77
End: 2022-09-21

## 2022-09-21 VITALS
DIASTOLIC BLOOD PRESSURE: 62 MMHG | WEIGHT: 144 LBS | SYSTOLIC BLOOD PRESSURE: 112 MMHG | OXYGEN SATURATION: 99 % | HEIGHT: 70 IN | BODY MASS INDEX: 20.62 KG/M2 | HEART RATE: 55 BPM

## 2022-09-21 PROCEDURE — 99214 OFFICE O/P EST MOD 30 MIN: CPT

## 2022-09-22 NOTE — HISTORY OF PRESENT ILLNESS
[FreeTextEntry1] : Sep 21, 2022     in person\par \par PCP: Dr. Joe Teixeira \par             Card: Patricia Calle (Hx angina, elevated lipids) \par             GI: Dr. Damir Lopez (Hx anemia) - \par             Ophthalmology: Santiago Valles p [phone] f [phone]\par             Pod: Dr. KARIME Callahan - available\par             Ortho - Dr. Dee at [facility]\par            .\par            CC: Diabetes since 2007 (, A1c 11.9 %) ***    3/2019  5.9;  10/2019  5.9  7/20  5.6 %\par             (anemia) \par             (L knee replaced - Dr. 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)\par \par Diet controlled diabetes.\par August A1c 6.3\par \par : :\par Constitutional:  Alert, well nourished, healthy appearance, no acute distress \par Eyes:  No proptosis, no stare\par Thyroid:  normal to palpation\par Pulmonary:  No respiratory distress, no accessory muscle use; normal rate and effort\par Cardiac:  Normal rate\par Vascular: \par Endocrine:  No stigmata of Cushing’s Syndrome\par Musculoskeletal:  Normal gait, no involuntary movements\par Neurology:  No tremors\par Affect/Mood/Psych:  Oriented x 3; normal affect, normal insight/judgement, normal mood \par .\par \par \par Impression:  He would find CGM educational.\par Needs password to install kae and he is not enthusiatic about a trial.  \par \par \par Karsten 10, 2022    in person\par \par PCP: Dr. Joe Teixeira \par             Card: Patricia Calle (Hx angina, elevated lipids) \par             GI: Dr. Damir Lopez (Hx anemia) - \par             Ophthalmology: Santiago Valles p [phone] f [phone]\par             Pod: Dr. KARIME Callahan - available\par             Ortho - Dr. Dee at [facility]\par            .\par            CC: Diabetes since 2007 (, A1c 11.9 %) ***    3/2019  5.9;  10/2019  5.9  7/20  5.6 %\par             (anemia) \par             (L knee replaced - Dr. 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  \par \par \par : :\par Constitutional:  Alert, well nourished, healthy appearance, no acute distress \par Eyes:  No proptosis, no stare\par Thyroid:\par Pulmonary:  No respiratory distress, no accessory muscle use; normal rate and effort\par Cardiac:  Normal rate\par Vascular: \par Endocrine:  No stigmata of Cushing’s Syndrome\par Musculoskeletal:  Normal gait, no involuntary movements\par Neurology:  No tremors\par Affect/Mood/Psych:  Oriented x 3; normal affect, normal insight/judgement, normal mood \par .\par \par Impression:   Controlling sugars.\par Endocrine contribution to weight loss not detected thus far..\par \par Plan:  A1c today and ROV in 5-6 months.\par \par \par \par Jul 26, 2021     in person\par \par PCP: Dr. Joe Teixeira \par             Card: Patricia Calle (Hx angina, elevated lipids \par             GI: Dr. Damir Lopez (Hx anemia) - \par             Ophthalmology: Santiago Valles p  f [phone]\par             Pod: Dr. KARIME Callahan - available\par             Ortho - Dr. Dee at  - [facility]\par            .\par            CC: Diabetes since 2007 (, A1c 11.9 %) ***    3/2019  5.9;  10/2019  5.9  7/20  5.6 %\par             (anemia) \par             (L knee replaced - Dr. 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   When he ran out of Januvia, that was discontinued, so \par currently the diabetes is being  controlled by diet.\par \par Had recent Cologuard test.\par Feels well.\par Brings in fingerstick BS, mostly fasting.\par Fasting sugars tend to run in range of 104 mg/dl\par \par \par : :                5 ft 10, 145 lb    (keeping to seafood and veggies)            \par Constitutional:  Alert, well nourished, healthy appearance, no acute distress \par Eyes:  No proptosis, no stare\par Thyroid:\par Pulmonary:  No respiratory distress, no accessory muscle use; normal rate and effort\par Cardiac:  Normal rate\par Vascular: \par Endocrine:  No stigmata of Cushing’s Syndrome\par Musculoskeletal:  Normal gait, no involuntary movements\par Neurology:  No tremors\par Affect/Mood/Psych:  Oriented x 3; normal affect, normal insight/judgement, normal mood \par .\par \par Impression:  Diabetes appears to be well controlled.\par \par Plan:  Records reviewed.\par A1c today.\par ROV six months.  \par Annual eye exam\par \par \par \par Jan 25, 2021    in person\par \par PCP: Dr. Joe Teixeira \par             Card: Patricia Calle (Hx angina, elevated lipids \par             GI: Dr. Damir Lopez (Hx anemia) - \par             Ophthalmology: Santiago Valles p  f [phone]\par             Pod: Dr. KARIME Callahan - available\par             Ortho - Dr. Dee at  -  [facility]\par            .\par            CC: Diabetes since 2007 (, A1c 11.9 %) ***    3/2019  5.9;  10/2019  5.9  7/20  5.6 %\par             (anemia) \par             (L knee replaced - Dr. 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   When he ran out of Januvia, that was discontinued, so \par currently the diabetes is being  controlled by diet.\par \par : :\par Constitutional:  Alert, well nourished, healthy appearance, no acute distress \par Eyes:  No proptosis, no stare\par Thyroid:\par Pulmonary:  No respiratory distress, no accessory muscle use; normal rate and effort\par Cardiac:  Normal rate\par Vascular: \par Endocrine:  No stigmata of Cushing’s Syndrome\par Musculoskeletal:  Normal gait, no involuntary movements\par Neurology:  No tremors\par Affect/Mood/Psych:  Oriented x 3; normal affect, normal insight/judgement, normal mood \par .\par  Impression  Diabetes well controlled by attention to diet. \par \par Plan:  Updated labs today.\par \par \par \par \par Aug 31, 2020    in person\par \par PCP: Dr. Joe Teixeira \par             Card: Patricia Calle (Hx anginia, elev lipids \par             GI: Dr. Hayes (Hx anemia) - colonoscopy last week - [place]\par             Ophthalmology: Santiago Valles p [phone] f [phone]\par             Pod: Dr. KARIME Callahan - available\par             Ortho - Dr. Dee at [facility]\par            .\par            CC: Diabetes since 2007 (, A1c 11.9 %) ***    3/2019  5.9;  10/2019  5.9\par             (anemia) \par             (L knee replaced - Dr. 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 Joe Teixeira \par             Card: Patricia Calle (Hx anginia, elev lipids \par             GI: Dr. Hayes (Hx anemia) - colonoscopy last week - [place]\par             Ophthalmology: Santiago Valles p  f [phone]\par             Pod: Dr. KARIME Callahan - available\par             Ortho - Dr. Dee at [facility]\par            .\par            CC: Diabetes since 2007 (, A1c 11.9 %) ***    3/2019  5.9;  10/2019  5.9\par             (anemia) \par             (L knee replaced - Dr. 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 Joe Teixeira \par             Card: Patricia Calle (Hx anginia, elev lipids \par             GI: Dr. Hayes (Hx anemia) - colonoscopy last week - [place]\par             Ophthalmology: Santiago Valles p  f [phone]\par             Pod: Dr. KARIME Callahan - available\par             Ortho - Dr. Dee at [facility]\par            .\par            CC: Diabetes since 2007 (, A1c 11.9 %) ***\par             (anemia) \par             (L knee replaced - Dr. 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  Last saw ophthalmology during the summer.\par \par Plan:  Updated A1c today.\par ROV in March or April with a few records.  \par \par \par \par \par March 18, 2019\par \par  PCP: Dr. Joe Teixeira \par             Card: Patricia Calle (Hx anginia, elev lipids \par             GI: Dr. Hayes (Hx anemia) - colonoscopy last week - [place]\par             Ophthalmology: Santiago Valles p  f [phone]\par             Pod: Dr. KARIME Callahan - available\par             Ortho - Dr. Dee at [facility]\par            .\par            CC: Diabetes since 2007 (, A1c 11.9 %) ***\par             (anemia) \par             (L knee replaced - Dr. 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 Joe Teixeira \par             Card: Patricia Calle (Hx anginia, elev lipids \par             GI: Dr. Hayes (Hx anemia) - colonoscopy last week - [place]\par             Ophthalmology: Santiago Valles p  f [phone]\par             Pod: Dr. KARIME Callahan - available\par             Ortho - Dr. Dee at [facility]\par            .\par            CC: Diabetes since 2007 (, A1c 11.9 %) ***\par             (anemia) \par             (L knee replaced - Dr. 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 calcifications, \par            .\par            6/20/2018 ER visit for back pain\par            -incidental . hepatic cyist detected\par            .\par            9/7/2018 [facility] included\par            microalbumin - 5\par            Hct 40.3\par            MCV 82.4 \par            A1c 5.8 % \par             m/gdl\par            creatinine 1.12\par            calcium 9.7 *****\par            uric acid 5.3 \par            GGT 24\par            \par            ESR 2\par            PTH 40\par            RF neg \par            immunofix: neg \par            Lyme - neg\par            25 OH vit D 45\par            PSA 2.1 \par            1,25 di OH vit D 47 (18-72)\par            ionized calcium normal\par            .\par            Impression: He is on ferrous gluconate for anemia and he would like to know his ferritin and Fe/TIBC.\par            .\par            Eye exam was excellent. .\par            .\par            ==\par            .\par            May 16, 2018\par            .\par            PCP: Dr. Joe Teixeira \par             Card: Patricia Calle (Hx anginia, elev lipids \par             GI: Dr. Hayes (Hx anemia) - colonoscopy last week - [place]\par             Ophthalmology: Santiago Orloff p  f [phone]\par             Pod: Dr. KARIME Callahan - available\par             Ortho - Dr. Dee at [facility]\par            .\par            CC: Diabetes since 2007 (, A1c 11.9 %) ***\par             (anemia) \par             (L knee replaced - Dr. 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 \par            .\par            Impression: Feeling well.\par            Slightly elevated [facility] calcium. \par            A1c in good range.\par            .\par            Plan: Same Rx. \par            .\par            ==\par            .\par            January 16, 2018\par            .\par            PCP: Dr. Joe Teixeira \par             Card: Patricia Calle (Hx anginia, elev lipids \par             GI: Dr. Hayes (Hx anemia) - colonoscopy last week - [place]\par             Ophthalmology: Santiago Orloff p  f [phone]\par             Pod: Dr. KARIME Callahan - available\par             Ortho - Dr. Dee at [facility]\par            .\par            CC: Diabetes since 2007 (, A1c 11.9 %) ***\par             (anemia) \par             (L knee replaced - Dr. Lopez at [facility]) \par             (sleep challenges)\par             (ASHD)\par            .\par            Reports he saw ophthalmology a few months ago.\par            Currently has cough - saw Dr. Teixeira.\par            Has funny feeling in toes. \par            .\par            Impresion: He appears to be doing great !\par            .\par            ==\par            .\par            August 8, 2017\par            .\par            PCP: Dr. Joe Teixeira \par             Card: Patricia Calle (Hx anginia, elev lipids \par             GI: Dr. Hayes (Hx anemia) - colonoscopy last week - [place]\par             Ophthalmology: [facility] tomorrow\par             Pod: Dr. KARIME Callahan - available\par             Ortho - Dr. Dee at [facility]\par            .\par            CC: Diabetes since 2007 (, A1c 11.9 %) ***\par             (anemia) \par             (L knee replaced - Dr. Lopez at [facility]) \par             (sleep challenges)\par             (ASHD)\par            .\par            Had colonoscopy about a year ago by Dr. Hayes.\par            Declined upper endoscopy.\par            Took ferrous gluconate.\par            Recent labs at [facility] on\par            7/25/2017 included\par            BS 98\par            TSH 2.29 \par            Hct 42.7\par            MCV 79.7\par            A1c 5.9\par            .\par            .\par            Impression: Diabetes under excellent control based on his own fingerstick sugars as well as recent A1c of 5.9%\par            .\par            Plan: He will see Dr. Teixeira very soon.\par            ROV in about six months. Thank you. -jh\par            .\par            ==\par            .\par            Feb 8, 2017\par            .\par            PCP: Dr. Joe Teixeira \par             Card: Patricia Calle (Hx anginia, elev lipids \par             GI: Dr. Hayes (Hx anemia) - colonoscopy last week - [place]\par             Ophthalmology: [facility] tomorrow\par             Pod: Dr. KARIME Callahan - available\par             Ortho - Dr. Dee at [facility]\par            .\par            CC: Diabetes since 2007 (, A1c 11.9 %) ***\par             (anemia) \par             (L knee replaced - Dr. Lopez at [facility]) \par             (sleep challenges)\par             (ASHD)\par            .\par            On Bystolic and\par            JanuMet  BID for diabetes.\par            .\par            Recent [facility] A1c 6.2 %\par            urine microalbu 0.6 \par            .\par            Impression: Doing very well.\par            .\par            Plan: Reviewed fingerstick BS and guidelines.\par            Need for annual eye exam. \par            .\par            ==\par            .\par            August 8, 2016\par            .\par            PCP: Dr. Joe Teixeira \par             Card: Patricia Calle (Hx anginia, elev lipids \par             GI: Dr. Hayes (Hx anemia) - colonoscopy last week - [place]\par             Ophthalmology: [facility] tomorrow\par             Pod: Dr. KARIME Callahan - available\par             Ortho - Dr. Dee at  -  [facility]\par            .\par            CC: Diabetes since 2007 (, A1c 11.9 %) ***\par             (anemia) \par             (L knee replaced - Dr. Lopez at [facility]) \par             (sleep challenges)\par             (ASHD)\par            .\par            Recent labs ([facility]) show\par             mg/dl\par            B12 461 \par            HbA1c 6.3 %\par            .\par            Impression: Doing excellent job of controlling sugars.\par            .\par            Plan: Same Rx: JanuMet, diet, exercise. \par            .\par            ==\par            .\par            February 15, 2016\par            .\par            PCP: Dr. Joe Teixeira \par             Card: Patricia Calle (Hx anginia, elev lipids \par             GI: Dr. Hayes (Hx anemia)\par             Ophthalmology: Santiago Orloff [place] good report\par             Pod: Dr. KARIME Callahan \par            .\par            CC: Diabetes since 2007 (, A1c 11.9 %) ***\par             (anemia) \par             (L knee replaced - Dr. Lopez at [facility]) \par             (sleep challenges)\par             (ASHD)\par            .\par            Had recent evaluation for possible angina.\par            Studies turned out well.\par            Bystolic and isosorbide were prescribe.\par            Plans trip to [facility].\par            .\par            Eyes last checked a few months ago and was given a good report. \par            .\par            JanuMet  BID [facility] 60 days at a time\par            .\par            A1c 6.2 % (up from 6.0%)\par            .\par            Impression: Doing very well.\par            .\par            Plan: Restart fingerstick BS monitoring. \par            Updated A1c, B12, lipids in 4 months. \par            .\par            .\par            ==\par            .\par            August 19, 2015\par            .\par            PCP: Dr. Joe Teixeira \par             Card: Patricia Calle (Hx anginia, elev lipids \par             GI: Dr. Hayes (Hx anemia)\par             Ophthalmology: Santiago Orloff [place] good report\par             Pod: Dr. KARIME Callahan \par            .\par            CC: Diabetes since 2007 (, A1c 11.9 %) ***\par             (anemia) (L knee replaced - Dr. Lopez at [facility])\par             (sleep challenges)\par            .\par            Has an autographed copy of text by [employer]ola [employer]\par            Recent FBS 78 mg/dl\par            HbA1c 6.0%\par            .\par            Takes JanuMet  BID\par            .\par            Impression: Doing well. Notes some stomach rumbling so I will advise trial of decreasing the JanuMet to daily and repeating labs in about six months.\par            .\par            .\par            Plan: Same Rx and ROV six months. \par            .\par            ==\par            .\par            Feb 6, 2015\par            .\par            PCP: Dr. Joe Teixeira \par             Card: Patricia Calle (Hx anginia, elev lipids \par             GI: Dr. Hayes (Hx anemia)\par             Ophthalmology: Santiago Solaresvaldemar [facility] \par             Pod: Dr. Callahan\par            .\par            CC: Diabetes since 2007 (, A1c 11.9 %)\par             (anemia) (L knee replaced - Dr. Lopez at [facility])\par             (Lyme disease)\par             (sleep challenges)\par            .\par            Now teaching genomics.\par            .\par            Lab Jan 2015\par            FBS 99, \par            Urine microalbumin normal\par            HDL 42\par            LDL 64\par            TSH 1.68 Hct 43.7\par            A1c 6.5%\par            .\par            He admits that he exercises a lot and eats properly.\par            .\par            Meds for DM: JanuMet  BID\par            Atovastin 10 mg\par            Amlodipine 5 mg\par            Hyzaar - HCTZ\par            ASA 81 mg/dl\par            Symbacort\par            .\par            Impression: Diabetes well controlled. \par            Tests fingerstick BS about once a week.\par            .\par            Plan: Same Rx and ROV 6 months.\par            .\par            .\par            .\par            .\par            ====\par            Dec 18, 2013\par            PCP: Dr. Teixeira\par            CC: DM since 2007\par            .\par            Summary of history below.\par            Going to [place] as a friend there is quite ill. \par            Says FBS may be 100 - 115. \par            FBS has drifted up a bit.\par            Will consider adding glipizide ER 2.5 mg in PM and\par            continue Janumet 50/500 BID for now.\par            ..\par            ===\par            May 17, 2013\par            PCP: Dr. Joe Teixeira Card: Patricia Calle GI: Dr. Haeys\par             Ophthalmology: Santiago Valles [facility] Pod: Dr. Callahan\par            CC: Diabetes (anemia) (L knee replaced - Dr. Lopez at [facility])\par            Tick bite Nov 2012 - Doxy from acupuncturist. \par            .\par            DM Dx'd by Dr. Teixeira 2007 after he developed dizziness and BS over 600 with A1c of 11.9%.\par            Last note appended below. Has seen Dr. Calle - has angina, heart murmur. He is supposed to restart his walking, eliptical, etc. \par            .\par            Remains on Janumet 50/500 BID Recent A1c higher at 6.7%. Urine microalbumin within normal limits. Lipids not part of this panel.\par            .\par            .\par            Did not f/u to GI regarding anemia, but it resolved on Fe. \par            He feels that muscle pain may benefit from re-starting HGH. \par            .\par            .\par            +++++++++\par            # Hx angina - Dr. Calle - on Lipitor\par            # Recent anemia - colonoscopy by Dr. Hayes, declined upper endoscopy, Hct now back to normal\par            # Diabetes: on Janumet 50/500 BID. FBS in good range. A1c 6.1% Feels well. Recentlyu went to [place]. February 15, 2016\par            .\par            PCP: Dr. Joe Teixeira \par             Card: Patricia Calle (Hx anginia, elev lipids \par             GI: Dr. Hayes (Hx anemia)\par             Ophthalmology: Santiago Orloff [place] good report\par             Pod: Dr. KARIME Callahan \par            .\par            CC: Diabetes since 2007 (, A1c 11.9 %) ***\par             (anemia) \par             (L knee replaced - Dr. Lopez at [facility]) \par             (sleep challenges)\par             (ASHD)\par            .\par            Had recent evaluation for possible angina.\par            Studies turned out well.\par            Bystolic and isosorbide were prescribe.\par            Plans trip to [facility].\par            .\par            Eyes last checked a few months ago and was given a good report. \par            .\par            JanuMet  BID [facility] 60 days at a time\par            .\par            A1c 6.2 % (up from 6.0%)\par            .\par            Impression: Doing very well.\par            .\par            Plan: Restart fingerstick BS monitoring. \par            Updated A1c, B12, lipids in 4 months. \par            .\par            .\par            ==\par            .\par            August 19, 2015\par            .\par            PCP: Dr. Joe Teixeira \par             Card: Patricia Calle (Hx anginia, elev lipids \par             GI: Dr. Hayes (Hx anemia)\par             Ophthalmology: Santiago Orloff [place] good report\par             Pod: Dr. KARIME Callahan \par            .\par            CC: Diabetes since 2007 (, A1c 11.9 %) ***\par             (anemia) (L knee replaced - Dr. Lopez at [facility])\par             (sleep challenges)\par            .\par            Has an autographed copy of text by Bola Tim\par            Recent FBS 78 mg/dl\par            HbA1c 6.0%\par            .\par            Takes JanuMet  BID\par            .\par            Impression: Doing well. Notes some stomach rumbling so I will advise trial of decreasing the JanuMet to daily and repeating labs in about six months.\par            .\par            .\par            Plan: Same Rx and ROV six months. \par            .\par            ==\par            .\par            Feb 6, 2015\par            .\par            PCP: Dr. Joe Teixeira \par             Card: Patricia Calle (Hx anginia, elev lipids \par             GI: Dr. Hayes (Hx anemia)\par             Ophthalmology: Santiago Valles [facility] \par             Pod: Dr. Callahan\par            .\par            CC: Diabetes since 2007 (, A1c 11.9 %)\par             (anemia) (L knee replaced - Dr. Lopez at [facility])\par             (Lyme disease)\par             (sleep challenges)\par            .\par            Now teaching genomics.\par            .\par            Lab Jan 2015\par            FBS 99, \par            Urine microalbumin normal\par            HDL 42\par            LDL 64\par            TSH 1.68 Hct 43.7\par            A1c 6.5%\par            .\par            He admits that he exercises a lot and eats properly.\par            .\par            Meds for DM: JanuMet  BID\par            Atovastin 10 mg\par            Amlodipine 5 mg\par            Hyzaar - HCTZ\par            ASA 81 mg/dl\par            Symbacort\par            .\par            Impression: Diabetes well controlled. \par            Tests fingerstick BS about once a week.\par            .\par            Plan: Same Rx and ROV 6 months.\par            .\par            .\par            .\par            .\par            ====\par            Dec 18, 2013\par            PCP: Dr. Teixeira\par            CC: DM since 2007\par            .\par            Summary of history below.\par            Going to [place] as a friend there is quite ill. \par            Says FBS may be 100 - 115. \par            FBS has drifted up a bit.\par            Will consider adding glipizide ER 2.5 mg in PM and\par            continue Janumet 50/500 BID for now.\par            ..\par            ===\par            May 17, 2013\par            PCP: Dr. Joe Teixeira Card: Patricia Calle GI: Dr. Hayes\par             Ophthalmology: Santiago Valles [facility] Pod: Dr. Callahan\par            CC: Diabetes (anemia) (L knee replaced - Dr. Lopez at [facility])\par            Tick bite Nov 2012 - Doxy from acupuncturist. \par            .\par            DM Dx'd by Dr. Teixeira 2007 after he developed dizziness and BS over 600 with A1c of 11.9%.\par            Last note appended below. Has seen Dr. Calle - has angina, heart murmur. He is supposed to restart his walking, eliptical, etc. \par            .\par            Remains on Janumet 50/500 BID Recent A1c higher at 6.7%. Urine microalbumin within normal limits. Lipids not part of this panel.\par            .\par            .\par            Did not f/u to GI regarding anemia, but it resolved on Fe. \par            He feels that muscle pain may benefit from re-starting HGH. \par            .\par            .\par            +++++++++\par            # Hx angina - Dr. Calle - on Lipitor\par            # Recent anemia - colonoscopy by Dr. Hyaes, declined upper endoscopy, Hct now back to normal\par            # Diabetes: on Janumet 50/500 BID. FBS in good range. A1c 6.1% Feels well. Recentlyu went to [place]. February 15, 2016\par            .\par            PCP: Dr. Joe Teixeira \par             Card: Patricia Calle (Hx anginia, elev lipids \par             GI: Dr. Hayes (Hx anemia)\par             Ophthalmology: Santiago Valles [place] good report\par             Pod: Dr. KARIME Callahan \par            .\par            CC: Diabetes since 2007 (, A1c 11.9 %) ***\par             (anemia) \par             (L knee replaced - Dr. Lopez at [facility]) \par             (sleep challenges)\par             (ASHD)\par            .\par            Had recent evaluation for possible angina.\par            Studies turned out well.\par            Bystolic and isosorbide were prescribe.\par            Plans trip to [facility].\par            .\par            Eyes last checked a few months ago and was given a good report. \par            .\par            JanuMet  BID [facility] 60 days at a time\par            .\par            A1c 6.2 % (up from 6.0%)\par            .\par            Impression: Doing very well.\par            .\par            Plan: Restart fingerstick BS monitoring. \par            Updated A1c, B12, lipids in 4 months. \par            .\par            .\par            ==\par            .\par            August 19, 2015\par            .\par            PCP: Dr. Joe Teixeira \par             Card: Patricia Calle (Hx anginia, elev lipids \par             GI: Dr. Hayes (Hx anemia)\par             Ophthalmology: Santiago Orloff [place] good report\par             Pod: Dr. KARIME Callahan \par            .\par            CC: Diabetes since 2007 (, A1c 11.9 %) ***\par             (anemia) (L knee replaced - Dr. Lopez at [facility])\par             (sleep challenges)\par            .\par            Has an autographed copy of text by Bola Tim\par            Recent FBS 78 mg/dl\par            HbA1c 6.0%\par            .\par            Takes JanuMet  BID\par            .\par            Impression: Doing well. Notes some stomach rumbling so I will advise trial of decreasing the JanuMet to daily and repeating labs in about six months.\par            .\par            .\par            Plan: Same Rx and ROV six months. \par            .\par            ==\par            .\par            Feb 6, 2015\par            .\par            PCP: Dr. Joe Teixeira \par             Card: Patricia Calle (Hx anginia, elev lipids \par             GI: Dr. Hayes (Hx anemia)\par             Ophthalmology: [facility] \par             Pod: Dr. Callahan\par            .\par            CC: Diabetes since 2007 (, A1c 11.9 %)\par             (anemia) (L knee replaced - Dr. Lopez at [facility])\par             (Lyme disease)\par             (sleep challenges)\par            .\par            Now teaching genomics.\par            .\par            Lab Jan 2015\par            FBS 99, \par            Urine microalbumin normal\par            HDL 42\par            LDL 64\par            TSH 1.68 Hct 43.7\par            A1c 6.5%\par            .\par            He admits that he exercises a lot and eats properly.\par            .\par            Meds for DM: JanuMet  BID\par            Atovastin 10 mg\par            Amlodipine 5 mg\par            Hyzaar - HCTZ\par            ASA 81 mg/dl\par            Symbacort\par            .\par            Impression: Diabetes well controlled. \par            Tests fingerstick BS about once a week.\par            .\par            Plan: Same Rx and ROV 6 months.\par            .\par            .\par            .\par            .\par            ====\par            Dec 18, 2013\par            PCP: Dr. Teixeira\par            CC: DM since 2007\par            .\par            Summary of history below.\par            Going to [place] as a friend there is quite ill. \par            Says FBS may be 100 - 115. \par            FBS has drifted up a bit.\par            Will consider adding glipizide ER 2.5 mg in PM and\par            continue Janumet 50/500 BID for now.\par            ..\par            ===\par            May 17, 2013\par            PCP: Dr. Joe Teixeira Card: Patricia Calle GI: Dr. Hayes\par             Ophthalmology: Santiago Valles [facility] Pod: Dr. Callahan\par            CC: Diabetes (anemia) (L knee replaced - Dr. Lopez at [facility])\par            Tick bite Nov 2012 - Doxy from acupuncturist. \par            .\par            DM Dx'd by Dr. Teixeira 2007 after he developed dizziness and BS over 600 with A1c of 11.9%.\par            Last note appended below. Has seen Dr. Calle - has angina, heart murmur. He is supposed to restart his walking, eliptical, etc. \par            .\par            Remains on Janumet 50/500 BID Recent A1c higher at 6.7%. Urine microalbumin within normal limits. Lipids not part of this panel.\par            .\par            .\par            Did not f/u to GI regarding anemia, but it resolved on Fe. \par            He feels that muscle pain may benefit from re-starting HGH. \par            .\par            .\par            +++++++++\par            # Hx angina - Dr. Calle - on Lipitor\par            # Recent anemia - colonoscopy by Dr. Hayes, declined upper endoscopy, Hct now back to normal\par            # Diabetes: on Janumet 50/500 BID. FBS in good range. A1c 6.1% Feels well. Recentlyu went to [place]\par

## 2022-12-01 ENCOUNTER — APPOINTMENT (OUTPATIENT)
Dept: INTERNAL MEDICINE | Facility: CLINIC | Age: 77
End: 2022-12-01

## 2022-12-01 VITALS
SYSTOLIC BLOOD PRESSURE: 140 MMHG | WEIGHT: 144 LBS | TEMPERATURE: 96.4 F | BODY MASS INDEX: 20.62 KG/M2 | OXYGEN SATURATION: 93 % | HEIGHT: 70 IN | DIASTOLIC BLOOD PRESSURE: 70 MMHG | HEART RATE: 66 BPM

## 2022-12-01 PROCEDURE — 99213 OFFICE O/P EST LOW 20 MIN: CPT

## 2022-12-01 NOTE — PHYSICAL EXAM
[No Acute Distress] : no acute distress [Well Developed] : well developed [Well-Appearing] : well-appearing [Normal Sclera/Conjunctiva] : normal sclera/conjunctiva [PERRL] : pupils equal round and reactive to light [EOMI] : extraocular movements intact [Normal Outer Ear/Nose] : the outer ears and nose were normal in appearance [Normal Oropharynx] : the oropharynx was normal [No JVD] : no jugular venous distention [No Lymphadenopathy] : no lymphadenopathy [Supple] : supple [Thyroid Normal, No Nodules] : the thyroid was normal and there were no nodules present [No Respiratory Distress] : no respiratory distress  [No Accessory Muscle Use] : no accessory muscle use [Clear to Auscultation] : lungs were clear to auscultation bilaterally [Normal Rate] : normal rate  [Regular Rhythm] : with a regular rhythm [Normal S1, S2] : normal S1 and S2 [No Murmur] : no murmur heard [No Carotid Bruits] : no carotid bruits [No Abdominal Bruit] : a ~M bruit was not heard ~T in the abdomen [No Varicosities] : no varicosities [Pedal Pulses Present] : the pedal pulses are present [No Edema] : there was no peripheral edema [No Palpable Aorta] : no palpable aorta [No Extremity Clubbing/Cyanosis] : no extremity clubbing/cyanosis [Soft] : abdomen soft [Non Tender] : non-tender [Non-distended] : non-distended [No Masses] : no abdominal mass palpated [No HSM] : no HSM [Normal Bowel Sounds] : normal bowel sounds [Normal Posterior Cervical Nodes] : no posterior cervical lymphadenopathy [Normal Anterior Cervical Nodes] : no anterior cervical lymphadenopathy [No CVA Tenderness] : no CVA  tenderness [No Spinal Tenderness] : no spinal tenderness [No Focal Deficits] : no focal deficits [Normal Gait] : normal gait [Normal Affect] : the affect was normal [Normal Insight/Judgement] : insight and judgment were intact [de-identified] : thin

## 2022-12-01 NOTE — ASSESSMENT
[FreeTextEntry1] : I believe the pain is likely due to muscular strain. Doubt kidney stone. Patient is advised to take Tylenol 2 every 8 hours. Will check urinalysis. Patient is to call me on Monday.

## 2022-12-01 NOTE — HISTORY OF PRESENT ILLNESS
[FreeTextEntry1] : Acute low back pain [de-identified] : Patient with a one week history of severe right mid to low back pain. The pain is sharp and worse with twisting. The pain was fairly severe but appears slightly better this morning. There is no urinary symptoms. There is no history of kidney stones. He has a history of osteoarthritis of the lumbosacral spine.

## 2022-12-02 LAB
APPEARANCE: CLEAR
BACTERIA: NEGATIVE
BILIRUBIN URINE: NEGATIVE
BLOOD URINE: NEGATIVE
COLOR: YELLOW
GLUCOSE QUALITATIVE U: NEGATIVE
HYALINE CASTS: 1 /LPF
KETONES URINE: NEGATIVE
LEUKOCYTE ESTERASE URINE: NEGATIVE
MICROSCOPIC-UA: NORMAL
NITRITE URINE: NEGATIVE
PH URINE: 6
PROTEIN URINE: NORMAL
RED BLOOD CELLS URINE: 2 /HPF
SPECIFIC GRAVITY URINE: 1.02
SQUAMOUS EPITHELIAL CELLS: 1 /HPF
UROBILINOGEN URINE: NORMAL
WHITE BLOOD CELLS URINE: 1 /HPF

## 2023-01-29 ENCOUNTER — NON-APPOINTMENT (OUTPATIENT)
Age: 78
End: 2023-01-29

## 2023-01-30 ENCOUNTER — APPOINTMENT (OUTPATIENT)
Dept: CARDIOLOGY | Facility: CLINIC | Age: 78
End: 2023-01-30
Payer: MEDICARE

## 2023-01-30 ENCOUNTER — NON-APPOINTMENT (OUTPATIENT)
Age: 78
End: 2023-01-30

## 2023-01-30 VITALS
HEIGHT: 70 IN | BODY MASS INDEX: 21.9 KG/M2 | WEIGHT: 153 LBS | DIASTOLIC BLOOD PRESSURE: 80 MMHG | SYSTOLIC BLOOD PRESSURE: 126 MMHG | OXYGEN SATURATION: 97 % | HEART RATE: 56 BPM

## 2023-01-30 DIAGNOSIS — G89.29 LOW BACK PAIN, UNSPECIFIED: ICD-10-CM

## 2023-01-30 DIAGNOSIS — M54.50 LOW BACK PAIN, UNSPECIFIED: ICD-10-CM

## 2023-01-30 PROCEDURE — 93000 ELECTROCARDIOGRAM COMPLETE: CPT

## 2023-01-30 PROCEDURE — 99214 OFFICE O/P EST MOD 30 MIN: CPT

## 2023-01-30 NOTE — HISTORY OF PRESENT ILLNESS
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 CTA was denied.\par \par There have been no hospitalizations since last visit.\par He was taken off his diabetic medications by Dr Hellerman, now on diet and exercise alone. \par \par He denies chest pain, dyspnea, palpitations or syncope\par He does his own PT at the gym 2 -3 times a week and an elliptical.\par Continues to have low back pain.

## 2023-02-03 ENCOUNTER — RX RENEWAL (OUTPATIENT)
Age: 78
End: 2023-02-03

## 2023-02-06 ENCOUNTER — APPOINTMENT (OUTPATIENT)
Dept: ENDOCRINOLOGY | Facility: CLINIC | Age: 78
End: 2023-02-06
Payer: MEDICARE

## 2023-02-06 VITALS
SYSTOLIC BLOOD PRESSURE: 125 MMHG | DIASTOLIC BLOOD PRESSURE: 83 MMHG | HEART RATE: 55 BPM | HEIGHT: 70 IN | BODY MASS INDEX: 20.76 KG/M2 | WEIGHT: 145 LBS | OXYGEN SATURATION: 97 %

## 2023-02-06 PROCEDURE — 99214 OFFICE O/P EST MOD 30 MIN: CPT | Mod: 25

## 2023-02-06 PROCEDURE — 36415 COLL VENOUS BLD VENIPUNCTURE: CPT

## 2023-02-07 LAB
ALBUMIN SERPL ELPH-MCNC: 4.4 G/DL
ALP BLD-CCNC: 58 U/L
ALT SERPL-CCNC: 27 U/L
ANION GAP SERPL CALC-SCNC: 17 MMOL/L
AST SERPL-CCNC: 22 U/L
BASOPHILS # BLD AUTO: 0.04 K/UL
BASOPHILS NFR BLD AUTO: 1 %
BILIRUB DIRECT SERPL-MCNC: 0.1 MG/DL
BILIRUB INDIRECT SERPL-MCNC: 0.2 MG/DL
BILIRUB SERPL-MCNC: 0.4 MG/DL
BUN SERPL-MCNC: 18 MG/DL
CALCIUM SERPL-MCNC: 9.5 MG/DL
CHLORIDE SERPL-SCNC: 104 MMOL/L
CHOLEST SERPL-MCNC: 104 MG/DL
CO2 SERPL-SCNC: 26 MMOL/L
CREAT SERPL-MCNC: 1.14 MG/DL
EGFR: 65 ML/MIN/1.73M2
EOSINOPHIL # BLD AUTO: 0.1 K/UL
EOSINOPHIL NFR BLD AUTO: 2.4 %
ESTIMATED AVERAGE GLUCOSE: 146 MG/DL
GLUCOSE SERPL-MCNC: 189 MG/DL
HBA1C MFR BLD HPLC: 6.7 %
HCT VFR BLD CALC: 45.2 %
HDLC SERPL-MCNC: 40 MG/DL
HGB BLD-MCNC: 13.4 G/DL
IMM GRANULOCYTES NFR BLD AUTO: 0.2 %
LDLC SERPL CALC-MCNC: 46 MG/DL
LYMPHOCYTES # BLD AUTO: 1 K/UL
LYMPHOCYTES NFR BLD AUTO: 24.3 %
MAN DIFF?: NORMAL
MCHC RBC-ENTMCNC: 25.5 PG
MCHC RBC-ENTMCNC: 29.6 GM/DL
MCV RBC AUTO: 85.9 FL
MONOCYTES # BLD AUTO: 0.35 K/UL
MONOCYTES NFR BLD AUTO: 8.5 %
NEUTROPHILS # BLD AUTO: 2.62 K/UL
NEUTROPHILS NFR BLD AUTO: 63.6 %
NONHDLC SERPL-MCNC: 64 MG/DL
PLATELET # BLD AUTO: 172 K/UL
POTASSIUM SERPL-SCNC: 3.4 MMOL/L
PROT SERPL-MCNC: 6.5 G/DL
RBC # BLD: 5.26 M/UL
RBC # FLD: 14 %
SODIUM SERPL-SCNC: 147 MMOL/L
T4 SERPL-MCNC: 7.2 UG/DL
TRIGL SERPL-MCNC: 91 MG/DL
TSH SERPL-ACNC: 1.31 UIU/ML
VIT B12 SERPL-MCNC: 745 PG/ML
WBC # FLD AUTO: 4.12 K/UL

## 2023-02-10 NOTE — HISTORY OF PRESENT ILLNESS
[FreeTextEntry1] : Feb 06, 2023     in person\par \par PCP: Dr. Joe Teixeira \par             Card: Patricia Calle (Hx angina, elevated lipids) \par             GI: Dr. Damir Lopez (Hx anemia) - \par             Ophthalmology: Santiago Valles p [phone] f [phone]\par             Pod: Dr. KARIME Callahan - available\par             Ortho - Dr. Dee at [facility]\par            .\par            CC: Diabetes since 2007 (, A1c 11.9 %) ***    3/2019  5.9;  10/2019  5.9  7/20  5.6 %\par             (anemia) \par             (L knee replaced - Dr. 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   Recent A1c in August 6.7  \par Diet controlled diabetes.\par August A1c 6.7.\par \par His fingerstick BS checked regularly, but not available today. \par Acquiesces to a trial of Ruthy 2\par \par : :\par Constitutional:  Alert, well nourished, healthy appearance, no acute distress \par Eyes:  No proptosis, no stare\par Thyroid:\par Pulmonary:  No respiratory distress, no accessory muscle use; normal rate and effort\par Cardiac:  Normal rate\par Vascular: \par Endocrine:  No stigmata of Cushing’s Syndrome\par Musculoskeletal:  Normal gait, no involuntary movements\par Neurology:  No tremors\par Affect/Mood/Psych:  Oriented x 3; normal affect, normal insight/judgement, normal mood \par .\par \par \par \par \par \par \par Sep 21, 2022     in person\par \par PCP: Dr. Joe Teixeira \par             Card: Patricia Calle (Hx angina, elevated lipids) \par             GI: Dr. Damir Lopez (Hx anemia) - \par             Ophthalmology: Santiago Valles p [phone] f [phone]\par             Pod: Dr. KARIME Callahan - available\par             Ortho - Dr. Dee at [facility]\par            .\par            CC: Diabetes since 2007 (, A1c 11.9 %) ***    3/2019  5.9;  10/2019  5.9  7/20  5.6 %\par             (anemia) \par             (L knee replaced - Dr. Lopez at [facility]) \par             (sleep challenges)\par             (ASHD)\par             ( 6/2018: back pain: scoliosis)\par             (6/2018: hepatic cyst)\par \par Diet controlled diabetes.\par August A1c 6.3\par \par : :\par Constitutional:  Alert, well nourished, healthy appearance, no acute distress \par Eyes:  No proptosis, no stare\par Thyroid:  normal to palpation\par Pulmonary:  No respiratory distress, no accessory muscle use; normal rate and effort\par Cardiac:  Normal rate\par Vascular: \par Endocrine:  No stigmata of Cushing’s Syndrome\par Musculoskeletal:  Normal gait, no involuntary movements\par Neurology:  No tremors\par Affect/Mood/Psych:  Oriented x 3; normal affect, normal insight/judgement, normal mood \par .\par \par \par Impression:  He would find CGM educational.\par Needs password to install kae and he is not enthusiatic about a trial.  \par \par \par Karsten 10, 2022    in person\par \par PCP: Dr. Joe Teixeira \par             Card: Patricia Calle (Hx angina, elevated lipids) \par             GI: Dr. Damir Lopez (Hx anemia) - \par             Ophthalmology: Santiago Valles p  f [phone]\par             Pod: Dr. KARIME Callahan - available\par             Ortho - Dr. Dee at  -  [facility]\par            .\par            CC: Diabetes since 2007 (, A1c 11.9 %) ***    3/2019  5.9;  10/2019  5.9  7/20  5.6 %\par             (anemia) \par             (L knee replaced - Dr. 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  \par \par \par : :\par Constitutional:  Alert, well nourished, healthy appearance, no acute distress \par Eyes:  No proptosis, no stare\par Thyroid:\par Pulmonary:  No respiratory distress, no accessory muscle use; normal rate and effort\par Cardiac:  Normal rate\par Vascular: \par Endocrine:  No stigmata of Cushing’s Syndrome\par Musculoskeletal:  Normal gait, no involuntary movements\par Neurology:  No tremors\par Affect/Mood/Psych:  Oriented x 3; normal affect, normal insight/judgement, normal mood \par .\par \par Impression:   Controlling sugars.\par Endocrine contribution to weight loss not detected thus far..\par \par Plan:  A1c today and ROV in 5-6 months.\par \par \par \par Jul 26, 2021     in person\par \par PCP: Dr. Joe Teixeira \par             Card: Patricia Calle (Hx angina, elevated lipids \par             GI: Dr. Damir Lopez (Hx anemia) - \par             Ophthalmology: Santiago Valles p  f [phone]\par             Pod: Dr. KARIME Callahan - available\par             Ortho - Dr. Dee at  -  [facility]\par            .\par            CC: Diabetes since 2007 (, A1c 11.9 %) ***    3/2019  5.9;  10/2019  5.9  7/20  5.6 %\par             (anemia) \par             (L knee replaced - Dr. Lopez at [facility]) \par             (sleep challenges)\par             (ASHD)\par             ( 6/2018: back pain: scoliosis)\par             (6/2018: hepatic cyst)\par \par Because of weight loss, switched from JanuMet to Januvia.   When he ran out of Januvia, that was discontinued, so \par currently the diabetes is being  controlled by diet.\par \par Had recent Cologuard test.\par Feels well.\par Brings in fingerstick BS, mostly fasting.\par Fasting sugars tend to run in range of 104 mg/dl\par \par \par : :                5 ft 10, 145 lb    (keeping to seafood and veggies)            \par Constitutional:  Alert, well nourished, healthy appearance, no acute distress \par Eyes:  No proptosis, no stare\par Thyroid:\par Pulmonary:  No respiratory distress, no accessory muscle use; normal rate and effort\par Cardiac:  Normal rate\par Vascular: \par Endocrine:  No stigmata of Cushing’s Syndrome\par Musculoskeletal:  Normal gait, no involuntary movements\par Neurology:  No tremors\par Affect/Mood/Psych:  Oriented x 3; normal affect, normal insight/judgement, normal mood \par .\par \par Impression:  Diabetes appears to be well controlled.\par \par Plan:  Records reviewed.\par A1c today.\par ROV six months.  \par Annual eye exam\par \par \par \par Jan 25, 2021    in person\par \par PCP: Dr. Joe Teixeira \par             Card: Patricia Calle (Hx angina, elevated lipids \par             GI: Dr. Damir Lopez (Hx anemia) - \par             Ophthalmology: Santiago Valles p  f [phone]\par             Pod: Dr. KARIME Callahan - available\par             Ortho - Dr. Dee at  -  [facility]\par            .\par            CC: Diabetes since 2007 (, A1c 11.9 %) ***    3/2019  5.9;  10/2019  5.9  7/20  5.6 %\par             (anemia) \par             (L knee replaced - Dr. Lopez at [facility]) \par             (sleep challenges)\par             (ASHD)\par             ( 6/2018: back pain: scoliosis)\par             (6/2018: hepatic cyst)\par \par Because of weight loss, switched from JanuMet to Januvia.   When he ran out of Januvia, that was discontinued, so \par currently the diabetes is being  controlled by diet.\par \par : :\par Constitutional:  Alert, well nourished, healthy appearance, no acute distress \par Eyes:  No proptosis, no stare\par Thyroid:\par Pulmonary:  No respiratory distress, no accessory muscle use; normal rate and effort\par Cardiac:  Normal rate\par Vascular: \par Endocrine:  No stigmata of Cushing’s Syndrome\par Musculoskeletal:  Normal gait, no involuntary movements\par Neurology:  No tremors\par Affect/Mood/Psych:  Oriented x 3; normal affect, normal insight/judgement, normal mood \par .\par  Impression  Diabetes well controlled by attention to diet. \par \par Plan:  Updated labs today.\par \par \par \par \par Aug 31, 2020    in person\par \par PCP: Dr. Joe Teixeira \par             Card: Patricia Calle (Hx anginia, elev lipids \par             GI: Dr. Hayes (Hx anemia) - colonoscopy last week - [place]\par             Ophthalmology: Santiago Orpaul p [phone] f [phone]\par             Pod: Dr. KARIME Callahan - available\par             Ortho - Dr. Dee at [facility]\par            .\par            CC: Diabetes since 2007 (, A1c 11.9 %) ***    3/2019  5.9;  10/2019  5.9\par             (anemia) \par             (L knee replaced - Dr. Lopez at [facility]) \par             (sleep challenges)\par             (ASHD)\par             ( 6/2018: back pain: scoliosis)\par             (6/2018: hepatic cyst)\par \par Remains on JanuMet  BID\par Concerned about weight loss.\par \par Plan:   Stop JanuMet and start Januvia 50 mg daily \par \par \par Feb 24, 2020\par \par PCP: Dr. Joe Teixeira \par             Card: Patricia Calle (Hx anginia, elev lipids \par             GI: Dr. Hayes (Hx anemia) - colonoscopy last week - [place]\par             Ophthalmology: Santiago Valles p [phone] f [phone]\par             Pod: Dr. KARIME Callahan - available\par             Ortho - Dr. Dee at [facility]\par            .\par            CC: Diabetes since 2007 (, A1c 11.9 %) ***    3/2019  5.9;  10/2019  5.9\par             (anemia) \par             (L knee replaced - Dr. Lopez at [facility]) \par             (sleep challenges)\par             (ASHD)\par             ( 6/2018: back pain: scoliosis)\par             (6/2018: hepatic cyst)\par \par Remains on JanuMet  BID\par Feels well.\par A1c in March and Oct:  5.9 %\par \par Impression:  Doing well.\par \par Plan:  Updated A1c, BMP.\par See GI to follow up on hepatic cyst\par \par \par \par \par Oct 25, 2019\par \par PCP: Dr. Joe Teixeira \par             Card: Patricia Calle (Hx anginia, elev lipids \par             GI: Dr. Hayes (Hx anemia) - colonoscopy last week - [place]\par             Ophthalmology: Santiago Solaresloff p  f [phone]\par             Pod: Dr. KARIME Calalhan - available\par             Ortho - Dr. Dee at [facility]\par            .\par            CC: Diabetes since 2007 (, A1c 11.9 %) ***\par             (anemia) \par             (L knee replaced - Dr. Lopez at [facility]) \par             (sleep challenges)\par             (ASHD)\par             ( 6/2018: back pain: scoliosis)\par             (6/2018: hepatic cyst)\par \par Visits for diabetes for which he takes JanuMet  BID.\par NR\par Last A1c in March 2019 was 5.9 %\par \par Impression:  By history, doing well.  Last saw ophthalmology during the summer.\par \par Plan:  Updated A1c today.\par ROV in March or April with a few records.  \par \par \par \par \par March 18, 2019\par \par  PCP: Dr. Joe Teixeira \par             Card: Patricia Calle (Hx anginia, elev lipids \par             GI: Dr. Hayes (Hx anemia) - colonoscopy last week - [place]\par             Ophthalmology: Santiago Orloff p  f [phone]\par             Pod: Dr. KARIME Callahan - available\par             Ortho - Dr. Dee at [facility]\par            .\par            CC: Diabetes since 2007 (, A1c 11.9 %) ***\par             (anemia) \par             (L knee replaced - Dr. Lopez at [facility]) \par             (sleep challenges)\par             (ASHD)\par             ( 6/2018: back pain: scoliosis)\par             (6/2018: hepatic cyst)\par \par At [place] on\par 3/5/2019\par A1c 5.9\par B12 596\par ferritin 30 ()\par WBC 4.18\par Hct 41.3\par serum iron 103\par \par \par \par \par \par Previous notes from [employer] appended below.\par \par    Sept 17, 2018\par            .\par            PCP: Dr. Joe Teixeira \par             Card: Patricia Calle (Hx anginia, elev lipids \par             GI: Dr. Hayes (Hx anemia) - colonoscopy last week - [place]\par             Ophthalmology: Santiago Valles p  f [phone]\par             Pod: Dr. KARIME Callahan - available\par             Ortho - Dr. Dee at  - [facility]\par            .\par            CC: Diabetes since 2007 (, A1c 11.9 %) ***\par             (anemia) \par             (L knee replaced - Dr. Lopez at [facility]) \par             (sleep challenges)\par             (ASHD)\par             ( 6/2018: back pain: scoliosis)\par             (6/2018: hepatic cyst)\par            .\par            5/16/2018 X-ray R hip: mild bilateral hip arthopathy....arterial vasc. calcifications, \par            .\par            6/20/2018 ER visit for back pain\par            -incidental . hepatic cyist detected\par            .\par            9/7/2018 [facility] included\par            microalbumin - 5\par            Hct 40.3\par            MCV 82.4 \par            A1c 5.8 % \par             m/gdl\par            creatinine 1.12\par            calcium 9.7 *****\par            uric acid 5.3 \par            GGT 24\par            \par            ESR 2\par            PTH 40\par            RF neg \par            immunofix: neg \par            Lyme - neg\par            25 OH vit D 45\par            PSA 2.1 \par            1,25 di OH vit D 47 (18-72)\par            ionized calcium normal\par            .\par            Impression: He is on ferrous gluconate for anemia and he would like to know his ferritin and Fe/TIBC.\par            .\par            Eye exam was excellent. .\par            .\par            ==\par            .\par            May 16, 2018\par            .\par            PCP: Dr. Joe Teixeira \par             Card: Patricia Calle (Hx anginia, elev lipids \par             GI: Dr. Hayes (Hx anemia) - colonoscopy last week - [place]\par             Ophthalmology: Santiago Valles p  f [phone]\par             Pod: Dr. KARIME Callahan - available\par             Ortho - Dr. Dee at [facility]\par            .\par            CC: Diabetes since 2007 (, A1c 11.9 %) ***\par             (anemia) \par             (L knee replaced - Dr. 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 \par            .\par            Impression: Feeling well.\par            Slightly elevated [facility] calcium. \par            A1c in good range.\par            .\par            Plan: Same Rx. \par            .\par            ==\par            .\par            January 16, 2018\par            .\par            PCP: Dr. Joe Teixeira \par             Card: Patricia Calle (Hx anginia, elev lipids \par             GI: Dr. Hayes (Hx anemia) - colonoscopy last week - [place]\par             Ophthalmology: Santiago Valles p  f [phone]\par             Pod: Dr. KARIME Callahan - available\par             Ortho - Dr. Dee at [facility]\par            .\par            CC: Diabetes since 2007 (, A1c 11.9 %) ***\par             (anemia) \par             (L knee replaced - Dr. Lopez at [facility]) \par             (sleep challenges)\par             (ASHD)\par            .\par            Reports he saw ophthalmology a few months ago.\par            Currently has cough - saw Dr. Teixeira.\par            Has funny feeling in toes. \par            .\par            Impresion: He appears to be doing great !\par            .\par            ==\par            .\par            August 8, 2017\par            .\par            PCP: Dr. Joe Teixeira \par             Card: Patricia Calle (Hx anginia, elev lipids \par             GI: Dr. Hayes (Hx anemia) - colonoscopy last week - [place]\par             Ophthalmology: Santiago Valles [facility] tomorrow\par             Pod: Dr. KARIME Callahan - available\par             Ortho - Dr. Dee at [facility]\par            .\par            CC: Diabetes since 2007 (, A1c 11.9 %) ***\par             (anemia) \par             (L knee replaced - Dr. Lopez at [facility]) \par             (sleep challenges)\par             (ASHD)\par            .\par            Had colonoscopy about a year ago by Dr. Hayes.\par            Declined upper endoscopy.\par            Took ferrous gluconate.\par            Recent labs at [facility] on\par            7/25/2017 included\par            BS 98\par            TSH 2.29 \par            Hct 42.7\par            MCV 79.7\par            A1c 5.9\par            .\par            .\par            Impression: Diabetes under excellent control based on his own fingerstick sugars as well as recent A1c of 5.9%\par            .\par            Plan: He will see Dr. Teixeira very soon.\par            ROV in about six months. Thank you. -\par            .\par            ==\par            .\par            Feb 8, 2017\par            .\par            PCP: Dr. Joe Teixeira \par             Card: Patricia Calle (Hx anginia, elev lipids \par             GI: Dr. Hayes (Hx anemia) - colonoscopy last week - [place]\par             Ophthalmology: Santiago Valles [facility] tomorrow\par             Pod: Dr. KARIME Callahan - available\par             Ortho - Dr. Dee at [facility]\par            .\par            CC: Diabetes since 2007 (, A1c 11.9 %) ***\par             (anemia) \par             (L knee replaced - Dr. Lopez at [facility]) \par             (sleep challenges)\par             (ASHD)\par            .\par            On Bystolic and\par            JanuMet  BID for diabetes.\par            .\par            Recent [facility] A1c 6.2 %\par            urine microalbu 0.6 \par            .\par            Impression: Doing very well.\par            .\par            Plan: Reviewed fingerstick BS and guidelines.\par            Need for annual eye exam. \par            .\par            ==\par            .\par            August 8, 2016\par            .\par            PCP: Dr. Joe Teixeira \par             Card: Patricia Calle (Hx anginia, elev lipids \par             GI: Dr. Hayes (Hx anemia) - colonoscopy last week - [place]\par             Ophthalmology: [facility] tomorrow\par             Pod: Dr. KARIME Callahan - available\par             Ortho - Dr. Dee at  - [facility]\par            .\par            CC: Diabetes since 2007 (, A1c 11.9 %) ***\par             (anemia) \par             (L knee replaced - Dr. Lopez at [facility]) \par             (sleep challenges)\par             (ASHD)\par            .\par            Recent labs ([facility]) show\par             mg/dl\par            B12 461 \par            HbA1c 6.3 %\par            .\par            Impression: Doing excellent job of controlling sugars.\par            .\par            Plan: Same Rx: JanuMet, diet, exercise. \par            .\par            ==\par            .\par            February 15, 2016\par            .\par            PCP: Dr. Joe Teixeira \par             Card: Patricia Calle (Hx anginia, elev lipids \par             GI: Dr. Hayes (Hx anemia)\par             Ophthalmology: Santiago Orloff [place] good report\par             Pod: Dr. KARIME Callahan \par            .\par            CC: Diabetes since 2007 (, A1c 11.9 %) ***\par             (anemia) \par             (L knee replaced - Dr. Lopez at [facility]) \par             (sleep challenges)\par             (ASHD)\par            .\par            Had recent evaluation for possible angina.\par            Studies turned out well.\par            Bystolic and isosorbide were prescribe.\par            Plans trip to [facility].\par            .\par            Eyes last checked a few months ago and was given a good report. \par            .\par            JanuMet  BID [facility] 60 days at a time\par            .\par            A1c 6.2 % (up from 6.0%)\par            .\par            Impression: Doing very well.\par            .\par            Plan: Restart fingerstick BS monitoring. \par            Updated A1c, B12, lipids in 4 months. \par            .\par            .\par            ==\par            .\par            August 19, 2015\par            .\par            PCP: Dr. Joe Teixeira \par             Card: Ptaricia Calle (Hx anginia, elev lipids \par             GI: Dr. Hayes (Hx anemia)\par             Ophthalmology: Santiago Orloff [place] good report\par             Pod: Dr. KARIME Callahan \par            .\par            CC: Diabetes since 2007 (, A1c 11.9 %) ***\par             (anemia) (L knee replaced - Dr. Lopez at [facility])\par             (sleep challenges)\par            .\par            Has an autographed copy of text by Bola [employer]\par            Recent FBS 78 mg/dl\par            HbA1c 6.0%\par            .\par            Takes JanuMet  BID\par            .\par            Impression: Doing well. Notes some stomach rumbling so I will advise trial of decreasing the JanuMet to daily and repeating labs in about six months.\par            .\par            .\par            Plan: Same Rx and ROV six months. \par            .\par            ==\par            .\par            Feb 6, 2015\par            .\par            PCP: Dr. Joe Teixeira \par             Card: Patricia Calle (Hx anginia, elev lipids \par             GI: Dr. Hayes (Hx anemia)\par             Ophthalmology: [facility] \par             Pod: Dr. Callahan\par            .\par            CC: Diabetes since 2007 (, A1c 11.9 %)\par             (anemia) (L knee replaced - Dr. Lopez at [facility])\par             (Lyme disease)\par             (sleep challenges)\par            .\par            Now teaching genomics.\par            .\par            Lab Jan 2015\par            FBS 99, \par            Urine microalbumin normal\par            HDL 42\par            LDL 64\par            TSH 1.68 Hct 43.7\par            A1c 6.5%\par            .\par            He admits that he exercises a lot and eats properly.\par            .\par            Meds for DM: JanuMet  BID\par            Atovastin 10 mg\par            Amlodipine 5 mg\par            Hyzaar - HCTZ\par            ASA 81 mg/dl\par            Symbacort\par            .\par            Impression: Diabetes well controlled. \par            Tests fingerstick BS about once a week.\par            .\par            Plan: Same Rx and ROV 6 months.\par            .\par            .\par            .\par            .\par            ====\par            Dec 18, 2013\par            PCP: Dr. Teixeira\par            CC: DM since 2007\par            .\par            Summary of history below.\par            Going to [place] as a friend there is quite ill. \par            Says FBS may be 100 - 115. \par            FBS has drifted up a bit.\par            Will consider adding glipizide ER 2.5 mg in PM and\par            continue Janumet 50/500 BID for now.\par            ..\par            ===\par            May 17, 2013\par            PCP: Dr. Joe Teixeira Card: Patricia Calle GI: Dr. Hayes\par             Ophthalmology: [facility] Pod: Dr. Callahan\par            CC: Diabetes (anemia) (L knee replaced - Dr. Lopez at [facility])\par            Tick bite Nov 2012 - Doxy from acupuncturist. \par            .\par            DM Dx'd by Dr. Teixeira 2007 after he developed dizziness and BS over 600 with A1c of 11.9%.\par            Last note appended below. Has seen Dr. Calle - has angina, heart murmur. He is supposed to restart his walking, eliptical, etc. \par            .\par            Remains on Janumet 50/500 BID Recent A1c higher at 6.7%. Urine microalbumin within normal limits. Lipids not part of this panel.\par            .\par            .\par            Did not f/u to GI regarding anemia, but it resolved on Fe. \par            He feels that muscle pain may benefit from re-starting HGH. \par            .\par            .\par            +++++++++\par            # Hx angina - Dr. Calle - on Lipitor\par            # Recent anemia - colonoscopy by Dr. Hayes, declined upper endoscopy, Hct now back to normal\par            # Diabetes: on Janumet 50/500 BID. FBS in good range. A1c 6.1% Feels well. Recentlyu went to [place]. February 15, 2016\par            .\par            PCP: Dr. Joe Teixeira \par             Card: Patricia Calle (Hx anginia, elev lipids \par             GI: Dr. Hayes (Hx anemia)\par             Ophthalmology: Santiago Orloff [place] good report\par             Pod: Dr. KARIME Callahan \par            .\par            CC: Diabetes since 2007 (, A1c 11.9 %) ***\par             (anemia) \par             (L knee replaced - Dr. Lopez at [facility]) \par             (sleep challenges)\par             (ASHD)\par            .\par            Had recent evaluation for possible angina.\par            Studies turned out well.\par            Bystolic and isosorbide were prescribe.\par            Plans trip to [facility].\par            .\par            Eyes last checked a few months ago and was given a good report. \par            .\par            JanuMet  BID [facility] 60 days at a time\par            .\par            A1c 6.2 % (up from 6.0%)\par            .\par            Impression: Doing very well.\par            .\par            Plan: Restart fingerstick BS monitoring. \par            Updated A1c, B12, lipids in 4 months. \par            .\par            .\par            ==\par            .\par            August 19, 2015\par            .\par            PCP: Dr. Joe Teixeira \par             Card: Patricia Calle (Hx anginia, elev lipids \par             GI: Dr. Hayes (Hx anemia)\par             Ophthalmology: Santiago Valles [place] good report\par             Pod: Dr. KARIME Callahan \par            .\par            CC: Diabetes since 2007 (, A1c 11.9 %) ***\par             (anemia) (L knee replaced - Dr. Lopez at [facility])\par             (sleep challenges)\par            .\par            Has an autographed copy of text by Bola Tim\par            Recent FBS 78 mg/dl\par            HbA1c 6.0%\par            .\par            Takes JanuMet  BID\par            .\par            Impression: Doing well. Notes some stomach rumbling so I will advise trial of decreasing the JanuMet to daily and repeating labs in about six months.\par            .\par            .\par            Plan: Same Rx and ROV six months. \par            .\par            ==\par            .\par            Feb 6, 2015\par            .\par            PCP: Dr. Joe Teixeira \par             Card: Patricia Calle (Hx anginia, elev lipids \par             GI: Dr. Hayes (Hx anemia)\par             [facility] \par             Pod: Dr. Callahan\par            .\par            CC: Diabetes since 2007 (, A1c 11.9 %)\par             (anemia) (L knee replaced - Dr. Lopez at [facility])\par             (Lyme disease)\par             (sleep challenges)\par            .\par            Now teaching genomics.\par            .\par            Lab Jan 2015\par            FBS 99, \par            Urine microalbumin normal\par            HDL 42\par            LDL 64\par            TSH 1.68 Hct 43.7\par            A1c 6.5%\par            .\par            He admits that he exercises a lot and eats properly.\par            .\par            Meds for DM: JanuMet  BID\par            Atovastin 10 mg\par            Amlodipine 5 mg\par            Hyzaar - HCTZ\par            ASA 81 mg/dl\par            Symbacort\par            .\par            Impression: Diabetes well controlled. \par            Tests fingerstick BS about once a week.\par            .\par            Plan: Same Rx and ROV 6 months.\par            .\par            .\par            .\par            .\par            ====\par            Dec 18, 2013\par            PCP: Dr. Teiexira\par            CC: DM since 2007\par            .\par            Summary of history below.\par            Going to [place] as a friend there is quite ill. \par            Says FBS may be 100 - 115. \par            FBS has drifted up a bit.\par            Will consider adding glipizide ER 2.5 mg in PM and\par            continue Janumet 50/500 BID for now.\par            ..\par            ===\par            May 17, 2013\par            PCP: Dr. Joe Teixeira Card: Patricia Calle GI: Dr. Hayes\par             Ophthalmology: Santiago Valels [facility] Pod: Dr. Callahan\par            CC: Diabetes (anemia) (L knee replaced - Dr. Lopez at [facility])\par            Tick bite Nov 2012 - Doxy from acupuncturist. \par            .\par            DM Dx'd by Dr. Teixeira 2007 after he developed dizziness and BS over 600 with A1c of 11.9%.\par            Last note appended below. Has seen Dr. Calle - has angina, heart murmur. He is supposed to restart his walking, eliptical, etc. \par            .\par            Remains on Janumet 50/500 BID Recent A1c higher at 6.7%. Urine microalbumin within normal limits. Lipids not part of this panel.\par            .\par            .\par            Did not f/u to GI regarding anemia, but it resolved on Fe. \par            He feels that muscle pain may benefit from re-starting HGH. \par            .\par            .\par            +++++++++\par            # Hx angina - Dr. Calle - on Lipitor\par            # Recent anemia - colonoscopy by Dr. Hayes, declined upper endoscopy, Hct now back to normal\par            # Diabetes: on Janumet 50/500 BID. FBS in good range. A1c 6.1% Feels well. Recentlyu went to [place]. February 15, 2016\par            .\par            PCP: Dr. Joe Teixeira \par             Card: Patricia Calle (Hx anginia, elev lipids \par             GI: Dr. Hayes (Hx anemia)\par             Ophthalmology: Santiago Orloff [place] good report\par             Pod: Dr. KARIME Callahan \par            .\par            CC: Diabetes since 2007 (, A1c 11.9 %) ***\par             (anemia) \par             (L knee replaced - Dr. Lopez at [facility]) \par             (sleep challenges)\par             (ASHD)\par            .\par            Had recent evaluation for possible angina.\par            Studies turned out well.\par            Bystolic and isosorbide were prescribe.\par            Plans trip to [facility].\par            .\par            Eyes last checked a few months ago and was given a good report. \par            .\par            JanuMet  BID [facility] 60 days at a time\par            .\par            A1c 6.2 % (up from 6.0%)\par            .\par            Impression: Doing very well.\par            .\par            Plan: Restart fingerstick BS monitoring. \par            Updated A1c, B12, lipids in 4 months. \par            .\par            .\par            ==\par            .\par            August 19, 2015\par            .\par            PCP: Dr. Joe Teixeira \par             Card: Patricia Calle (Hx anginia, elev lipids \par             GI: Dr. Hayes (Hx anemia)\par             Ophthalmology: [facility] [place] good report\par             Pod: Dr. KARIME Callahan \par            .\par            CC: Diabetes since 2007 (, A1c 11.9 %) ***\par             (anemia) (L knee replaced - Dr. Lopez at [facility])\par             (sleep challenges)\par            .\par            Has an autographed copy of text by Bola Tim\par            Recent FBS 78 mg/dl\par            HbA1c 6.0%\par            .\par            Takes JanuMet  BID\par            .\par            Impression: Doing well. Notes some stomach rumbling so I will advise trial of decreasing the JanuMet to daily and repeating labs in about six months.\par            .\par            .\par            Plan: Same Rx and ROV six months. \par            .\par            ==\par            .\par            Feb 6, 2015\par            .\par            PCP: Dr. Joe Teixeira \par             Card: Patricia Calle (Hx anginia, elev lipids \par             GI: Dr. Hayes (Hx anemia)\par             Ophthalmology: Santiago Valles [facility] \par             Pod: Dr. Callahan\par            .\par            CC: Diabetes since 2007 (, A1c 11.9 %)\par             (anemia) (L knee replaced - Dr. Lopez at [facility])\par             (Lyme disease)\par             (sleep challenges)\par            .\par            Now teaching genomics.\par            .\par            Lab Jan 2015\par            FBS 99, \par            Urine microalbumin normal\par            HDL 42\par            LDL 64\par            TSH 1.68 Hct 43.7\par            A1c 6.5%\par            .\par            He admits that he exercises a lot and eats properly.\par            .\par            Meds for DM: JanuMet  BID\par            Atovastin 10 mg\par            Amlodipine 5 mg\par            Hyzaar - HCTZ\par            ASA 81 mg/dl\par            Symbacort\par            .\par            Impression: Diabetes well controlled. \par            Tests fingerstick BS about once a week.\par            .\par            Plan: Same Rx and ROV 6 months.\par            .\par            .\par            .\par            .\par            ====\par            Dec 18, 2013\par            PCP: Dr. Teixeira\par            CC: DM since 2007\par            .\par            Summary of history below.\par            Going to [place] as a friend there is quite ill. \par            Says FBS may be 100 - 115. \par            FBS has drifted up a bit.\par            Will consider adding glipizide ER 2.5 mg in PM and\par            continue Janumet 50/500 BID for now.\par            ..\par            ===\par            May 17, 2013\par            PCP: Dr. Joe Teixeira Card: Patricia Calle GI: Dr. Hayes\par             Ophthalmology: Santiago Valles [facility] Pod: Dr. Callahan\par            CC: Diabetes (anemia) (L knee replaced - Dr. Lopez at [facility])\par            Tick bite Nov 2012 - Doxy from acupuncturist. \par            .\par            DM Dx'd by Dr. Teixeira 2007 after he developed dizziness and BS over 600 with A1c of 11.9%.\par            Last note appended below. Has seen Dr. Calle - has angina, heart murmur. He is supposed to restart his walking, eliptical, etc. \par            .\par            Remains on Janumet 50/500 BID Recent A1c higher at 6.7%. Urine microalbumin within normal limits. Lipids not part of this panel.\par            .\par            .\par            Did not f/u to GI regarding anemia, but it resolved on Fe. \par            He feels that muscle pain may benefit from re-starting HGH. \par            .\par            .\par            +++++++++\par            # Hx angina - Dr. Calle - on Lipitor\par            # Recent anemia - colonoscopy by Dr. Hayes, declined upper endoscopy, Hct now back to normal\par            # Diabetes: on Janumet 50/500 BID. FBS in good range. A1c 6.1% Feels well. Recentlyu went to [place]\par

## 2023-02-15 ENCOUNTER — APPOINTMENT (OUTPATIENT)
Dept: INTERNAL MEDICINE | Facility: CLINIC | Age: 78
End: 2023-02-15
Payer: MEDICARE

## 2023-02-15 VITALS
HEART RATE: 53 BPM | RESPIRATION RATE: 16 BRPM | WEIGHT: 145 LBS | BODY MASS INDEX: 20.76 KG/M2 | HEIGHT: 70 IN | DIASTOLIC BLOOD PRESSURE: 68 MMHG | SYSTOLIC BLOOD PRESSURE: 122 MMHG | TEMPERATURE: 97 F | OXYGEN SATURATION: 97 %

## 2023-02-15 PROCEDURE — 99203 OFFICE O/P NEW LOW 30 MIN: CPT | Mod: 25

## 2023-02-15 PROCEDURE — G0444 DEPRESSION SCREEN ANNUAL: CPT | Mod: 59

## 2023-02-15 PROCEDURE — 36415 COLL VENOUS BLD VENIPUNCTURE: CPT

## 2023-02-16 ENCOUNTER — TRANSCRIPTION ENCOUNTER (OUTPATIENT)
Age: 78
End: 2023-02-16

## 2023-02-17 ENCOUNTER — TRANSCRIPTION ENCOUNTER (OUTPATIENT)
Age: 78
End: 2023-02-17

## 2023-02-21 ENCOUNTER — TRANSCRIPTION ENCOUNTER (OUTPATIENT)
Age: 78
End: 2023-02-21

## 2023-02-21 RX ORDER — ATORVASTATIN CALCIUM 20 MG/1
20 TABLET, FILM COATED ORAL DAILY
Qty: 90 | Refills: 3 | Status: DISCONTINUED | COMMUNITY
Start: 2020-11-12 | End: 2023-02-21

## 2023-02-26 ENCOUNTER — TRANSCRIPTION ENCOUNTER (OUTPATIENT)
Age: 78
End: 2023-02-26

## 2023-03-08 ENCOUNTER — TRANSCRIPTION ENCOUNTER (OUTPATIENT)
Age: 78
End: 2023-03-08

## 2023-03-08 LAB
ANION GAP SERPL CALC-SCNC: 13 MMOL/L
BUN SERPL-MCNC: 18 MG/DL
CALCIUM SERPL-MCNC: 9.8 MG/DL
CHLORIDE SERPL-SCNC: 102 MMOL/L
CO2 SERPL-SCNC: 25 MMOL/L
CREAT SERPL-MCNC: 1.06 MG/DL
EGFR: 71 ML/MIN/1.73M2
GLUCOSE SERPL-MCNC: 85 MG/DL
POTASSIUM SERPL-SCNC: 3.8 MMOL/L
SODIUM SERPL-SCNC: 141 MMOL/L

## 2023-03-09 ENCOUNTER — TRANSCRIPTION ENCOUNTER (OUTPATIENT)
Age: 78
End: 2023-03-09

## 2023-04-13 ENCOUNTER — APPOINTMENT (OUTPATIENT)
Dept: PAIN MANAGEMENT | Facility: CLINIC | Age: 78
End: 2023-04-13
Payer: MEDICARE

## 2023-04-13 VITALS
HEIGHT: 70 IN | BODY MASS INDEX: 20.76 KG/M2 | TEMPERATURE: 97.6 F | WEIGHT: 145 LBS | SYSTOLIC BLOOD PRESSURE: 128 MMHG | DIASTOLIC BLOOD PRESSURE: 78 MMHG

## 2023-04-13 PROCEDURE — 99204 OFFICE O/P NEW MOD 45 MIN: CPT

## 2023-04-13 NOTE — HISTORY OF PRESENT ILLNESS
[5] : 3. What number best describes how, during the past week, pain has interfered with your general activity? 5/10 pain [Back Pain] : back pain [___ mths] : [unfilled] month(s) ago [Constant] : constant [3] : a current pain level of 3/10 [4] : a minimum pain level of 4/10 [6] : a maximum pain level of 6/10 [Aching] : aching [Shooting] : shooting [Laying] : laying [Standing] : standing [Walking] : walking [Rest] : rest [Other: ___] : [unfilled] [FreeTextEntry1] : HPI\par \par  \par \par Mr. FREDY CORONADO is a 77 year M on baby ASA with pmhx of CAD, DM, Hyperlipidemia, ADRIÁN, Asthma, and HTN. C/o worsening chronic left low back pain, worst overnight and on rising. Gets better throughout the day. Pain so severe it impacts his ability to sleep. Radiates to right back at times. Pain does not radiate to the legs. Denies any additional weakness, numbness, bowel/bladder dysfunction.\par \par \par Previous and current pain medications/doses/effects: \par \par tylenol \par \par Previous Pain Treatments:\par \par  exercises\par \par Previous Pain Injections:\par \par  \par \par Previous Diagnostic Studies/Images:\par \par 9/3/21  XR lumbar spine, multiple views\par \par INDICATION: Lumbar back pain\par \par TECHNIQUE: AP, bilateral oblique and lateral radiographs of lumbar spine obtained\par \par COMPARISON: June 11, 2021 lumbar spine radiograph\par \par FINDINGS:\par \par 5 nonrib-bearing lumbar-type vertebral bodies are noted. For purposes of report, inferior-most\par well-formed intervertebral disc space will be designated as L5-S1. No radiographic evidence for\par transitional lumbosacral anatomy.\par \par  Diffuse osteopenia. Mild S-shaped curvature of the lumbar spine. Lumbar vertebral body heights are\par maintained. Mild L4-L5 anterolisthesis, L3-4 retrolisthesis, L2-L3 retrolisthesis, L5-S1\par retrolisthesis there are moderate multilevel degenerative endplate changes with osteophyte\par formation, intervertebral disc space narrowing and endplate irregularity. Atherosclerotic\par calcifications of abdominal aorta. Sacrum are obscured by bowel gas.\par \par \par \par  IMPRESSION:\par \par  No radiographic evidence for lumbar compression fracture.\par  Degenerative changes as above. Diffuse osteopenia. Multilevel spondylolistheses as above.\par  Please note that, relative to radiography, MR spine imaging is a more sensitive imaging modality\par for detection of canal/foraminal stenosis and may be obtained as clinically warranted.\par \par \par \par --------------------------------------------------------------------------------------\par \par 06/20/18 MRI OF THE LUMBAR SPINE WITHOUT CONTRAST\par INDICATION: Back pain\par TECHNIQUE: Noncontrast axial T1 and T2, sagittal T1, T2, and STIR, and sagittal T2 volumetric with axial and coronal reformats.\par CONTRAST: None\par COMPARISON: No prior studies are available for comparison\par FINDINGS:\par There is a moderate dextroscoliosis of the lumbar spine. The lumbar lordosis is grossly maintained. There is mild degenerative grade 1 anterolisthesis of L4 on\par L5. The marrow signal is normal with no focal marrow replacing lesion identified. The vertebral body heights are maintained and there is no evidence of acute\par fracture or subluxation. There is no abnormal vertebral or paraspinal edema. The visualized paraspinal soft tissues demonstrate no acute abnormalities. There is\par a partially imaged cystic structure in the right upper abdomen measuring up to 7.9 cm, likely related to a large exophytic renal cyst.\par The conus medullaris terminates at L1-2 and the thecal sac terminates at S2. No abnormal signal is identified in the visualized spinal cord.\par L1-2: Moderate disc bulge. Mild facet/ligamentous hypertrophy. No significant central canal, subarticular, or foraminal stenosis.\par L2-3: Mild disc bulge. Moderate facet/ligament hypertrophy. No significant central canal, subarticular, or foraminal stenosis.\par L3-4: Mild disc bulge. Moderate facet/ligament hypertrophy. A synovial cyst projects into the posterior soft tissues from the left facet joint. Mild central\par canal stenosis. Mild bilateral subarticular zone stenosis. Moderate left foraminal stenosis and no significant right foraminal stenosis.\par L4-5: Grade 1 anterolisthesis of L4 on L5. Moderate disc bulge. Marked facet/minus hypertrophy. Moderate central canal and bilateral subarticular zone stenosis\par with some encroachment upon the cauda equina. Moderate bilateral foraminal stenosis.\par L5-S1: Moderate disc bulge. Mild facet/ligamentous hypertrophy. No significant central canal stenosis. Mild bilateral subarticular zone stenosis. No\par significant foraminal stenosis.\par IMPRESSION:\par Lumbar scoliosis and spondylosis. Moderate central canal stenosis at L4-5 with mild encroachment upon the cauda equina. Multilevel foraminal stenosis as above,\par most pronounced at L3-4 on the left and L4-5 bilaterally. No acute fracture or subluxation.\par \par \par  [FreeTextEntry2] : 15 [FreeTextEntry7] : Lower back pain left side  [FreeTextEntry3] : Pain is only in the morning [FreeTextEntry4] : stretches

## 2023-04-13 NOTE — PHYSICAL EXAM
[Facet Tenderness] : facet tenderness [Spine: Flexion to ___ degrees, without pain] : spine: flexion to [unfilled] degrees, without pain [de-identified] : Constitutional: Normal, well developed, no acute distress\par Eyes: Symmetric, External structures \par Oropharynx: Lips normal, symmetric, no external lesions appreciated\par Respiratory: Non-labored breathing, no audible wheezes\par Cardiac: Pulse palpated, no tachycardia\par Vascular: No cyanosis appreciated, no edema in bilateral lower extremities\par GI: Nondistended, no jaundice appreciated\par Neurovascular: CN2-12 grossly intact, Alert and oriented\par MSK: Normal muscle bulk, 5/5 Motor strength B/L in LE\par \par

## 2023-04-13 NOTE — ASSESSMENT
[FreeTextEntry1] : >> Imaging and Other Studies\par \par I personally reviewed the relevant imaging.  Discussed and explained to patient the likely source of pathology and pain.  Questions answered. MRI XR\par \par back and leg pain likely secondary to lumbar radiculopathy and discogenic pain refractory to conservative treatments including 6 consecutive weeks of home exercises/PT, will obtain MRI LS to evaluate for pathology\par \par may consider PT vs intervention pending eval\par \par >> Therapy and Other Modalities\par \par home exercises\par \par >> Medications\par \par acetaminophen 650mg q8h prn pain (caution <3g daily)\par  \par >> Interventions\par \par na\par \par >> Consults\par \par na\par \par >> Discussion of Risks/Benefits/Alternatives\par \par 	>Regarding any scheduled procedures:\par \par I have discussed in detail with the patient that any interventional pain procedure is associated with potential risks.  The procedure may include an injection of steroids and potentially other medications (local anesthetic and normal saline) into the epidural space or surrounding tissue of the spine.  There are significant risks of this procedure which include and are not limited to infection, bleeding, worsening pain, dural puncture leading to postdural puncture headache, nerve damage, spinal cord injury, paralysis, stroke, and death.  \par \par There is a chance that the procedure does not improve their pain.  \par \par There are risks associated with the steroid being absorbed into the body systemically.  These include dysphoria, difficulty sleeping, mood swings and personality changes.  Premenopausal women may notice an irregularity in her menstrual cycle for 2-3 months following the injection.  Steroids can specifically affect patients with hypertension, diabetes, and peptic ulcers.  The procedure may cause a temporary increase in blood pressure and blood pressure, and may adversely affect a peptic ulcer.  Other, more rare complications, include avascular necrosis of joints, glaucoma and worsening of osteoporosis. \par \par I have discussed the risks of the procedure at length with the patient, and the potential benefits of pain relief.  I have offered alternatives to the procedure.  All questions were answered.  \par \par The patient expressed understanding and wishes to proceed with the procedure.\par \par 	>Regarding COVID19 Pandemic: \par \par Any planned interventional pain procedure are scheduled because further delay may cause harm or negative outcome to patient.  The goal in performing this procedure is to avoid deterioration of function, emergency room visits (which increases exposure) and reliance on opioids.  \par \par r/b/a discussed with patient, lack of evidence to conclusively determine whether pain management procedures have any positive or negative impact on the possibility of ender the virus and/or development of any sequelae. \par \par Patient counselled regarding timing steroid based intervention 2 weeks before or after COVID-19 vaccine administration to avoid any interaction or affect on efficacy of vaccination\par \par Patient demonstrates understanding\par \par Informed patient that risks associated with the COVID-19 infection.  Informed patient steps taken to limit the risks.  We are implementing safety precautions and following protocols consistent with the CDC and state recommendations. All patients and staff will be checked for fever or signs of illness upon entry to the facility. We will limit our steroid dose to the lowest effective therapeutic dose or in some cases steroids will not be injected at all. \par \par Patient agrees to proceed\par \par >> Conclusion\par \par The above diagnosis and treatment plan is medically reasonable and necessary based on the patient encounter \par There were no barriers to communication.\par Informed patient that I would be available for any additional questions.\par Patient was instructed to call with any worsening symptoms including severe pain, new numbness/weakness, or changes in the bowel/bladder function. \par Discussed role of nsaids in pain management and all relevant risks, if patient is continuing to require after 4 weeks the patient should f/u for alternative treatment. \par Instructed patient to maintain pain diary to monitor pain level, mobility, and function.\par \par The referring provider was informed of the above diagnosis and treatment plan.\par

## 2023-04-13 NOTE — CONSULT LETTER
[Dear  ___] : Dear  [unfilled], [Consult Letter:] : I had the pleasure of evaluating your patient, [unfilled]. [Please see my note below.] : Please see my note below. [Consult Closing:] : Thank you very much for allowing me to participate in the care of this patient.  If you have any questions, please do not hesitate to contact me. [Sincerely,] : Sincerely, [FreeTextEntry3] : \par Mariah Murguia DO, MBA\par Director, Pain Management Center\par  of Anesthesiology\par [facility] [employer] [facility]\par \par \par

## 2023-04-13 NOTE — REASON FOR VISIT
[Initial Consultation] : an initial pain management consultation [FreeTextEntry2] : Referred by Dr. See

## 2023-04-22 ENCOUNTER — RESULT REVIEW (OUTPATIENT)
Age: 78
End: 2023-04-22

## 2023-04-27 NOTE — REVIEW OF SYSTEMS
Bed: 16  Expected date:   Expected time:   Means of arrival:   Comments:  [place] - ETOH   [All other systems negative] : All other systems negative

## 2023-05-10 ENCOUNTER — APPOINTMENT (OUTPATIENT)
Dept: PAIN MANAGEMENT | Facility: CLINIC | Age: 78
End: 2023-05-10
Payer: MEDICARE

## 2023-05-10 VITALS
BODY MASS INDEX: 20.76 KG/M2 | HEIGHT: 70 IN | WEIGHT: 145 LBS | SYSTOLIC BLOOD PRESSURE: 124 MMHG | DIASTOLIC BLOOD PRESSURE: 73 MMHG

## 2023-05-10 PROCEDURE — 99214 OFFICE O/P EST MOD 30 MIN: CPT

## 2023-05-10 NOTE — ASSESSMENT
[FreeTextEntry1] : >> Imaging and Other Studies\par \par I personally reviewed the relevant imaging.  Discussed and explained to patient the likely source of pathology and pain.  Questions answered. MRI XR\par \par >> Therapy and Other Modalities\par \par home exercises\par \par >> Medications\par \par acetaminophen 650mg q8h prn pain (caution <3g daily)\par  \par >> Interventions\par \par Significant component of back and leg pain likely secondary to lumbar spinal stenosis demonstrated on MRI LS.  Will schedule L5-S1 interlaminar epidural steroid injection r/b/a discussed\par \par Progressive right knee pain secondary to osteoarthritis refractory to conservative treatments, will schedule right knee steroid injection r/b/a discussed\par \par >> Consults\par \par na\par \par >> Discussion of Risks/Benefits/Alternatives\par \par 	>Regarding any scheduled procedures:\par \par I have discussed in detail with the patient that any interventional pain procedure is associated with potential risks.  The procedure may include an injection of steroids and potentially other medications (local anesthetic and normal saline) into the epidural space or surrounding tissue of the spine.  There are significant risks of this procedure which include and are not limited to infection, bleeding, worsening pain, dural puncture leading to postdural puncture headache, nerve damage, spinal cord injury, paralysis, stroke, and death.  \par \par There is a chance that the procedure does not improve their pain.  \par \par There are risks associated with the steroid being absorbed into the body systemically.  These include dysphoria, difficulty sleeping, mood swings and personality changes.  Premenopausal women may notice an irregularity in her menstrual cycle for 2-3 months following the injection.  Steroids can specifically affect patients with hypertension, diabetes, and peptic ulcers.  The procedure may cause a temporary increase in blood pressure and blood pressure, and may adversely affect a peptic ulcer.  Other, more rare complications, include avascular necrosis of joints, glaucoma and worsening of osteoporosis. \par \par I have discussed the risks of the procedure at length with the patient, and the potential benefits of pain relief.  I have offered alternatives to the procedure.  All questions were answered.  \par \par The patient expressed understanding and wishes to proceed with the procedure.\par \par 	>Regarding COVID19 Pandemic: \par \par Any planned interventional pain procedure are scheduled because further delay may cause harm or negative outcome to patient.  The goal in performing this procedure is to avoid deterioration of function, emergency room visits (which increases exposure) and reliance on opioids.  \par \par r/b/a discussed with patient, lack of evidence to conclusively determine whether pain management procedures have any positive or negative impact on the possibility of ender the virus and/or development of any sequelae. \par \par Patient counselled regarding timing steroid based intervention 2 weeks before or after COVID-19 vaccine administration to avoid any interaction or affect on efficacy of vaccination\par \par Patient demonstrates understanding\par \par Informed patient that risks associated with the COVID-19 infection.  Informed patient steps taken to limit the risks.  We are implementing safety precautions and following protocols consistent with the CDC and state recommendations. All patients and staff will be checked for fever or signs of illness upon entry to the facility. We will limit our steroid dose to the lowest effective therapeutic dose or in some cases steroids will not be injected at all. \par \par Patient agrees to proceed\par \par >> Conclusion\par \par The above diagnosis and treatment plan is medically reasonable and necessary based on the patient encounter \par There were no barriers to communication.\par Informed patient that I would be available for any additional questions.\par Patient was instructed to call with any worsening symptoms including severe pain, new numbness/weakness, or changes in the bowel/bladder function. \par Discussed role of nsaids in pain management and all relevant risks, if patient is continuing to require after 4 weeks the patient should f/u for alternative treatment. \par Instructed patient to maintain pain diary to monitor pain level, mobility, and function.\par \par The referring provider was informed of the above diagnosis and treatment plan.\par

## 2023-05-10 NOTE — HISTORY OF PRESENT ILLNESS
[Back Pain] : back pain [___ mths] : [unfilled] month(s) ago [Constant] : constant [3] : a current pain level of 3/10 [4] : a minimum pain level of 4/10 [6] : a maximum pain level of 6/10 [Aching] : aching [Shooting] : shooting [Laying] : laying [Standing] : standing [Walking] : walking [Rest] : rest [Other: ___] : [unfilled] [FreeTextEntry1] : Interval Note:\par \par Since last visit the pain is not improved.  Continues to have back and buttock pain L>R as well as right knee pain.  Pain is so bad that patient finds it difficult to perform adls and ambulate.  Worried that it will affect his ability to enjoy trip to [place]. Denies any additional weakness, numbness, bowel/bladder dysfunction.\par \par \par \par HPI\par \par  \par \par Mr. FREDY CORONADO is a 77 year M on baby ASA with pmhx of CAD, DM, Hyperlipidemia, ADRIÁN, Asthma, and HTN. C/o worsening chronic left low back pain, worst overnight and on rising. Gets better throughout the day. Pain so severe it impacts his ability to sleep. Radiates to right back at times. Pain does not radiate to the legs. Denies any additional weakness, numbness, bowel/bladder dysfunction.\par \par \par Previous and current pain medications/doses/effects: \par \par tylenol \par \par Previous Pain Treatments:\par \par  exercises\par \par Previous Pain Injections:\par \par  \par \par Previous Diagnostic Studies/Images:\par \par MRI LS 4/23\par \par LOCALIZER: No additional findings.\par  BONES: There is no fracture or bone marrow edema.\par  ALIGNMENT: There is moderate dextrocurvature of the thoracolumbar junction. There is grade 1\par anterolisthesis at L4-L5.\par  SACROILIAC JOINTS/SACRUM: There is no sacral fracture. The SI joints are partially visualized but\par are intact.\par  CONUS AND CAUDA EQUINA: The distal cord and conus are normal in signal. Conus terminates at L1.\par  VISUALIZED INTRAPELVIC/INTRA-ABDOMINAL SOFT TISSUES: No significant change in multiple hepatic and\par bilateral cysts\par  PARASPINAL SOFT TISSUES: Normal.\par \par \par  INDIVIDUAL LEVELS:\par \par  LOWER THORACIC SPINE: No spinal canal or neuroforaminal stenosis.\par \par  L1-L2: Broad-based posterior disc bulge and mild to moderate bilateral facet arthropathy result in\par moderate to severe left neural foraminal narrowing, mild to moderate right neural foraminal\par narrowing but no significant central canal narrowing.\par  L2-L3: Broad-based posterior disc bulge with superimposed left intraforaminal protrusion in\par conjunction with moderate to severe bilateral facet arthropathy (left greater than right) results in\par mild right neural foraminal narrowing, severe left neural foraminal narrowing and severe central\par canal narrowing. There is impingement of the bilateral descending [id number] roots within the right and\par left lateral recesses.\par  L3-L4: Broad-based posterior disc bulge with superimposed left intraforaminal protrusion in\par conjunction with severe bilateral facet arthropathy (left greater than right) result in severe\par central canal narrowing, moderate right neural foraminal narrowing and severe left neural foraminal\par narrowing.There is impingement of the bilateral descending [id number] roots within the right and left\par lateral recesses.\par  L4-L5: Grade 1 anterolisthesis. Broad-based posterior disc bulge and severe bilateral facet\par arthropathy (left greater than right) result in moderate left to severe bilateral neural foraminal\par narrowing and severe central canal narrowing. 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 There is also moderate to severe bilateral neural\par foraminal narrowing and severe central canal narrowing at L4-L5. Additional varying degrees of\par central canal and neural foraminal narrowing, as above. These findings are progressed as compared to\par prior examination dated 6/20/2018.\par \par 9/3/21  XR lumbar spine, multiple views\par \par INDICATION: Lumbar back pain\par \par TECHNIQUE: AP, bilateral oblique and lateral radiographs of lumbar spine obtained\par \par COMPARISON: June 11, 2021 lumbar spine radiograph\par \par FINDINGS:\par \par 5 nonrib-bearing lumbar-type vertebral bodies are noted. For purposes of report, inferior-most\par well-formed intervertebral disc space will be designated as L5-S1. No radiographic evidence for\par transitional lumbosacral anatomy.\par \par  Diffuse osteopenia. Mild S-shaped curvature of the lumbar spine. Lumbar vertebral body heights are\par maintained. Mild L4-L5 anterolisthesis, L3-4 retrolisthesis, L2-L3 retrolisthesis, L5-S1\par retrolisthesis there are moderate multilevel degenerative endplate changes with osteophyte\par formation, intervertebral disc space narrowing and endplate irregularity. Atherosclerotic\par calcifications of abdominal aorta. Sacrum are obscured by bowel gas.\par \par \par \par  IMPRESSION:\par \par  No radiographic evidence for lumbar compression fracture.\par  Degenerative changes as above. Diffuse osteopenia. Multilevel spondylolistheses as above.\par  Please note that, relative to radiography, MR spine imaging is a more sensitive imaging modality\par for detection of canal/foraminal stenosis and may be obtained as clinically warranted.\par \par \par \par --------------------------------------------------------------------------------------\par \par 06/20/18 MRI OF THE LUMBAR SPINE WITHOUT CONTRAST\par INDICATION: Back pain\par TECHNIQUE: Noncontrast axial T1 and T2, sagittal T1, T2, and STIR, and sagittal T2 volumetric with axial and coronal reformats.\par CONTRAST: None\par COMPARISON: No prior studies are available for comparison\par FINDINGS:\par There is a moderate dextroscoliosis of the lumbar spine. The lumbar lordosis is grossly maintained. There is mild degenerative grade 1 anterolisthesis of L4 on\par L5. The marrow signal is normal with no focal marrow replacing lesion identified. The vertebral body heights are maintained and there is no evidence of acute\par fracture or subluxation. There is no abnormal vertebral or paraspinal edema. The visualized paraspinal soft tissues demonstrate no acute abnormalities. There is\par a partially imaged cystic structure in the right upper abdomen measuring up to 7.9 cm, likely related to a large exophytic renal cyst.\par The conus medullaris terminates at L1-2 and the thecal sac terminates at S2. No abnormal signal is identified in the visualized spinal cord.\par L1-2: Moderate disc bulge. Mild facet/ligamentous hypertrophy. No significant central canal, subarticular, or foraminal stenosis.\par L2-3: Mild disc bulge. Moderate facet/ligament hypertrophy. No significant central canal, subarticular, or foraminal stenosis.\par L3-4: Mild disc bulge. Moderate facet/ligament hypertrophy. A synovial cyst projects into the posterior soft tissues from the left facet joint. Mild central\par canal stenosis. Mild bilateral subarticular zone stenosis. Moderate left foraminal stenosis and no significant right foraminal stenosis.\par L4-5: Grade 1 anterolisthesis of L4 on L5. Moderate disc bulge. Marked facet/minus hypertrophy. Moderate central canal and bilateral subarticular zone stenosis\par with some encroachment upon the cauda equina. Moderate bilateral foraminal stenosis.\par L5-S1: Moderate disc bulge. Mild facet/ligamentous hypertrophy. No significant central canal stenosis. Mild bilateral subarticular zone stenosis. No\par significant foraminal stenosis.\par IMPRESSION:\par Lumbar scoliosis and spondylosis. Moderate central canal stenosis at L4-5 with mild encroachment upon the cauda equina. Multilevel foraminal stenosis as above,\par most pronounced at L3-4 on the left and L4-5 bilaterally. No acute fracture or subluxation.\par \par \par  [FreeTextEntry2] : 16 [FreeTextEntry7] : Lower back pain left side  [FreeTextEntry3] : Pain is only in the morning [FreeTextEntry4] : stretches

## 2023-05-17 ENCOUNTER — APPOINTMENT (OUTPATIENT)
Dept: CARDIOLOGY | Facility: CLINIC | Age: 78
End: 2023-05-17
Payer: MEDICARE

## 2023-05-17 PROCEDURE — 36415 COLL VENOUS BLD VENIPUNCTURE: CPT

## 2023-05-18 ENCOUNTER — APPOINTMENT (OUTPATIENT)
Dept: PAIN MANAGEMENT | Facility: CLINIC | Age: 78
End: 2023-05-18
Payer: MEDICARE

## 2023-05-18 LAB
ALBUMIN SERPL ELPH-MCNC: 4.4 G/DL
ALP BLD-CCNC: 60 U/L
ALT SERPL-CCNC: 27 U/L
ANION GAP SERPL CALC-SCNC: 12 MMOL/L
AST SERPL-CCNC: 22 U/L
BILIRUB SERPL-MCNC: 0.3 MG/DL
BUN SERPL-MCNC: 20 MG/DL
CALCIUM SERPL-MCNC: 9.5 MG/DL
CHLORIDE SERPL-SCNC: 103 MMOL/L
CHOLEST SERPL-MCNC: 112 MG/DL
CO2 SERPL-SCNC: 27 MMOL/L
CREAT SERPL-MCNC: 1.04 MG/DL
EGFR: 73 ML/MIN/1.73M2
GLUCOSE SERPL-MCNC: 124 MG/DL
HDLC SERPL-MCNC: 48 MG/DL
LDLC SERPL CALC-MCNC: 57 MG/DL
NONHDLC SERPL-MCNC: 64 MG/DL
POTASSIUM SERPL-SCNC: 3.7 MMOL/L
PROT SERPL-MCNC: 6.8 G/DL
SODIUM SERPL-SCNC: 142 MMOL/L
TRIGL SERPL-MCNC: 36 MG/DL

## 2023-05-18 PROCEDURE — 20611 DRAIN/INJ JOINT/BURSA W/US: CPT | Mod: RT

## 2023-05-18 RX ADMIN — TRIAMCINOLONE ACETONIDE MG/ML: 10 INJECTION, SUSPENSION INTRA-ARTICULAR; INTRALESIONAL at 00:00

## 2023-05-19 ENCOUNTER — APPOINTMENT (OUTPATIENT)
Dept: PAIN MANAGEMENT | Facility: CLINIC | Age: 78
End: 2023-05-19
Payer: MEDICARE

## 2023-05-19 VITALS
SYSTOLIC BLOOD PRESSURE: 137 MMHG | HEART RATE: 61 BPM | DIASTOLIC BLOOD PRESSURE: 74 MMHG | BODY MASS INDEX: 20.76 KG/M2 | WEIGHT: 145 LBS | HEIGHT: 70 IN | TEMPERATURE: 98 F | OXYGEN SATURATION: 97 % | RESPIRATION RATE: 16 BRPM

## 2023-05-19 PROCEDURE — 62323 NJX INTERLAMINAR LMBR/SAC: CPT

## 2023-05-19 RX ADMIN — TRIAMCINOLONE ACETONIDE 0 MG/ML: 80 INJECTION, SUSPENSION INTRA-ARTICULAR; INTRAMUSCULAR at 00:00

## 2023-05-19 NOTE — PROCEDURE
[FreeTextEntry1] : INTERLAMINAR EPIDURAL STEROID INJECTION\par \par The patient was placed in the prone position on the fluoroscopic table with a pillow under the abdomen.  Routine monitors were applied.  A surgical timeout was performed.  The back was prepped and draped in the usual sterile fashion and sterile technique was adhered to throughout the entire procedure.\par \par The [L5-S1] was identified under fluroscopic guidance.  The vertebral body end plates were aligned and the spinous process was midline between the lateral processes.  The skin and subcutaneous tissues were infiltrated with [1% Lidocaine].  After adequate local anesthesia a ["20g Tuohy"] needle was inserted at [L5-S1]   and aimed towards RIGHT side.  \par \par Using a loss of resistance to air technique the epidural space was identified.  After negative aspiration for heme and CSF, 1cc Omnipaque N180 contrast dye was injected.  Epidural spread of contrast was confirmed in the AP and lateral views.  The patient was injected with a mixture of 80mg kenalog with 1cc lidocaine 1% and 2cc of PF normal saline in incremental amounts.\par \par The patient tolerated the procedure well.  There was no neurological deficit post procedure.  The patient went to recovery room in stable condition.  Discharge instructions were given to the patient.\par

## 2023-06-02 ENCOUNTER — APPOINTMENT (OUTPATIENT)
Dept: PAIN MANAGEMENT | Facility: CLINIC | Age: 78
End: 2023-06-02
Payer: MEDICARE

## 2023-06-02 VITALS
WEIGHT: 145 LBS | DIASTOLIC BLOOD PRESSURE: 72 MMHG | BODY MASS INDEX: 20.76 KG/M2 | SYSTOLIC BLOOD PRESSURE: 145 MMHG | HEIGHT: 70 IN

## 2023-06-02 PROCEDURE — 99214 OFFICE O/P EST MOD 30 MIN: CPT

## 2023-06-02 NOTE — ASSESSMENT
[FreeTextEntry1] : >> Imaging and Other Studies\par \par I personally reviewed the relevant imaging.  Discussed and explained to patient the likely source of pathology and pain.  Questions answered. MRI XR\par \par >> Therapy and Other Modalities\par \par start PT\par \par >> Medications\par \par acetaminophen 650mg q8h prn pain (caution <3g daily)\par  \par >> Interventions\par \par Significant component of back and leg pain likely secondary to lumbar spinal stenosis demonstrated on MRI LS.  sp  L5-S1 interlaminar epidural steroid injection \par \par Progressive right knee pain secondary to osteoarthritis refractory to conservative treatments, sp right knee steroid injection \par \par >> Consults\par \par na\par \par >> Discussion of Risks/Benefits/Alternatives\par \par 	>Regarding any scheduled procedures:\par \par I have discussed in detail with the patient that any interventional pain procedure is associated with potential risks.  The procedure may include an injection of steroids and potentially other medications (local anesthetic and normal saline) into the epidural space or surrounding tissue of the spine.  There are significant risks of this procedure which include and are not limited to infection, bleeding, worsening pain, dural puncture leading to postdural puncture headache, nerve damage, spinal cord injury, paralysis, stroke, and death.  \par \par There is a chance that the procedure does not improve their pain.  \par \par There are risks associated with the steroid being absorbed into the body systemically.  These include dysphoria, difficulty sleeping, mood swings and personality changes.  Premenopausal women may notice an irregularity in her menstrual cycle for 2-3 months following the injection.  Steroids can specifically affect patients with hypertension, diabetes, and peptic ulcers.  The procedure may cause a temporary increase in blood pressure and blood pressure, and may adversely affect a peptic ulcer.  Other, more rare complications, include avascular necrosis of joints, glaucoma and worsening of osteoporosis. \par \par I have discussed the risks of the procedure at length with the patient, and the potential benefits of pain relief.  I have offered alternatives to the procedure.  All questions were answered.  \par \par The patient expressed understanding and wishes to proceed with the procedure.\par \par 	>Regarding COVID19 Pandemic: \par \par Any planned interventional pain procedure are scheduled because further delay may cause harm or negative outcome to patient.  The goal in performing this procedure is to avoid deterioration of function, emergency room visits (which increases exposure) and reliance on opioids.  \par \par r/b/a discussed with patient, lack of evidence to conclusively determine whether pain management procedures have any positive or negative impact on the possibility of ender the virus and/or development of any sequelae. \par \par Patient counselled regarding timing steroid based intervention 2 weeks before or after COVID-19 vaccine administration to avoid any interaction or affect on efficacy of vaccination\par \par Patient demonstrates understanding\par \par Informed patient that risks associated with the COVID-19 infection.  Informed patient steps taken to limit the risks.  We are implementing safety precautions and following protocols consistent with the CDC and state recommendations. All patients and staff will be checked for fever or signs of illness upon entry to the facility. We will limit our steroid dose to the lowest effective therapeutic dose or in some cases steroids will not be injected at all. \par \par Patient agrees to proceed\par \par >> Conclusion\par \par The above diagnosis and treatment plan is medically reasonable and necessary based on the patient encounter \par There were no barriers to communication.\par Informed patient that I would be available for any additional questions.\par Patient was instructed to call with any worsening symptoms including severe pain, new numbness/weakness, or changes in the bowel/bladder function. \par Discussed role of nsaids in pain management and all relevant risks, if patient is continuing to require after 4 weeks the patient should f/u for alternative treatment. \par Instructed patient to maintain pain diary to monitor pain level, mobility, and function.\par \par \par

## 2023-06-02 NOTE — HISTORY OF PRESENT ILLNESS
[Back Pain] : back pain [___ mths] : [unfilled] month(s) ago [Constant] : constant [3] : a current pain level of 3/10 [4] : a minimum pain level of 4/10 [6] : a maximum pain level of 6/10 [Aching] : aching [Shooting] : shooting [Laying] : laying [Standing] : standing [Walking] : walking [Rest] : rest [Other: ___] : [unfilled] [FreeTextEntry1] : Interval Note:\par \par sp  L5-S1 interlaminar epidural steroid injection 5/19/23 and right knee steroid injection 5/18/23 with significant improvement in back and leg pain.  patient able to enjoy trip to [place], perform adls without pain. \par Denies any additional weakness, numbness, bowel/bladder dysfunction.\par \par \par \par HPI\par \par  \par \par Mr. FREDY CORONADO is a 78 year M on baby ASA with pmhx of CAD, DM, Hyperlipidemia, ADRIÁN, Asthma, and HTN. C/o worsening chronic left low back pain, worst overnight and on rising. Gets better throughout the day. Pain so severe it impacts his ability to sleep. Radiates to right back at times. Pain does not radiate to the legs. Denies any additional weakness, numbness, bowel/bladder dysfunction.\par \par \par Previous and current pain medications/doses/effects: \par \par tylenol \par \par Previous Pain Treatments:\par \par  exercises\par \par Previous Pain Injections:\par  L5-S1 interlaminar epidural steroid injection 5/19/23\par  right knee steroid injection 5/18/23\par \par Previous Diagnostic Studies/Images:\par \par MRI LS 4/23\par \par LOCALIZER: No additional findings.\par  BONES: There is no fracture or bone marrow edema.\par  ALIGNMENT: There is moderate dextrocurvature of the thoracolumbar junction. There is grade 1\par anterolisthesis at L4-L5.\par  SACROILIAC JOINTS/SACRUM: There is no sacral fracture. The SI joints are partially visualized but\par are intact.\par  CONUS AND CAUDA EQUINA: The distal cord and conus are normal in signal. Conus terminates at L1.\par  VISUALIZED INTRAPELVIC/INTRA-ABDOMINAL SOFT TISSUES: No significant change in multiple hepatic and\par bilateral cysts\par  PARASPINAL SOFT TISSUES: Normal.\par \par \par  INDIVIDUAL LEVELS:\par \par  LOWER THORACIC SPINE: No spinal canal or neuroforaminal stenosis.\par \par  L1-L2: Broad-based posterior disc bulge and mild to moderate bilateral facet arthropathy result in\par moderate to severe left neural foraminal narrowing, mild to moderate right neural foraminal\par narrowing but no significant central canal narrowing.\par  L2-L3: Broad-based posterior disc bulge with superimposed left intraforaminal protrusion in\par conjunction with moderate to severe bilateral facet arthropathy (left greater than right) results in\par mild right neural foraminal narrowing, severe left neural foraminal narrowing and severe central\par canal narrowing. There is impingement of the bilateral descending [id number] roots within the right and\par left lateral recesses.\par  L3-L4: Broad-based posterior disc bulge with superimposed left intraforaminal protrusion in\par conjunction with severe bilateral facet arthropathy (left greater than right) result in severe\par central canal narrowing, moderate right neural foraminal narrowing and severe left neural foraminal\par narrowing.There is impingement of the bilateral descending [id number] roots within the right and left\par lateral recesses.\par  L4-L5: Grade 1 anterolisthesis. Broad-based posterior disc bulge and severe bilateral facet\par arthropathy (left greater than right) result in moderate left to severe bilateral neural foraminal\par narrowing and severe central canal narrowing. 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 There is also moderate to severe bilateral neural\par foraminal narrowing and severe central canal narrowing at L4-L5. Additional varying degrees of\par central canal and neural foraminal narrowing, as above. These findings are progressed as compared to\par prior examination dated 6/20/2018.\par \par 9/3/21  XR lumbar spine, multiple views\par \par INDICATION: Lumbar back pain\par \par TECHNIQUE: AP, bilateral oblique and lateral radiographs of lumbar spine obtained\par \par COMPARISON: June 11, 2021 lumbar spine radiograph\par \par FINDINGS:\par \par 5 nonrib-bearing lumbar-type vertebral bodies are noted. For purposes of report, inferior-most\par well-formed intervertebral disc space will be designated as L5-S1. No radiographic evidence for\par transitional lumbosacral anatomy.\par \par  Diffuse osteopenia. Mild S-shaped curvature of the lumbar spine. Lumbar vertebral body heights are\par maintained. Mild L4-L5 anterolisthesis, L3-4 retrolisthesis, L2-L3 retrolisthesis, L5-S1\par retrolisthesis there are moderate multilevel degenerative endplate changes with osteophyte\par formation, intervertebral disc space narrowing and endplate irregularity. Atherosclerotic\par calcifications of abdominal aorta. Sacrum are obscured by bowel gas.\par \par \par \par  IMPRESSION:\par \par  No radiographic evidence for lumbar compression fracture.\par  Degenerative changes as above. Diffuse osteopenia. Multilevel spondylolistheses as above.\par  Please note that, relative to radiography, MR spine imaging is a more sensitive imaging modality\par for detection of canal/foraminal stenosis and may be obtained as clinically warranted.\par \par \par \par --------------------------------------------------------------------------------------\par \par 06/20/18 MRI OF THE LUMBAR SPINE WITHOUT CONTRAST\par INDICATION: Back pain\par TECHNIQUE: Noncontrast axial T1 and T2, sagittal T1, T2, and STIR, and sagittal T2 volumetric with axial and coronal reformats.\par CONTRAST: None\par COMPARISON: No prior studies are available for comparison\par FINDINGS:\par There is a moderate dextroscoliosis of the lumbar spine. The lumbar lordosis is grossly maintained. There is mild degenerative grade 1 anterolisthesis of L4 on\par L5. The marrow signal is normal with no focal marrow replacing lesion identified. The vertebral body heights are maintained and there is no evidence of acute\par fracture or subluxation. There is no abnormal vertebral or paraspinal edema. The visualized paraspinal soft tissues demonstrate no acute abnormalities. There is\par a partially imaged cystic structure in the right upper abdomen measuring up to 7.9 cm, likely related to a large exophytic renal cyst.\par The conus medullaris terminates at L1-2 and the thecal sac terminates at S2. No abnormal signal is identified in the visualized spinal cord.\par L1-2: Moderate disc bulge. Mild facet/ligamentous hypertrophy. No significant central canal, subarticular, or foraminal stenosis.\par L2-3: Mild disc bulge. Moderate facet/ligament hypertrophy. No significant central canal, subarticular, or foraminal stenosis.\par L3-4: Mild disc bulge. Moderate facet/ligament hypertrophy. A synovial cyst projects into the posterior soft tissues from the left facet joint. Mild central\par canal stenosis. Mild bilateral subarticular zone stenosis. Moderate left foraminal stenosis and no significant right foraminal stenosis.\par L4-5: Grade 1 anterolisthesis of L4 on L5. Moderate disc bulge. Marked facet/minus hypertrophy. Moderate central canal and bilateral subarticular zone stenosis\par with some encroachment upon the cauda equina. Moderate bilateral foraminal stenosis.\par L5-S1: Moderate disc bulge. Mild facet/ligamentous hypertrophy. No significant central canal stenosis. Mild bilateral subarticular zone stenosis. No\par significant foraminal stenosis.\par IMPRESSION:\par Lumbar scoliosis and spondylosis. Moderate central canal stenosis at L4-5 with mild encroachment upon the cauda equina. Multilevel foraminal stenosis as above,\par most pronounced at L3-4 on the left and L4-5 bilaterally. No acute fracture or subluxation.\par \par \par  [FreeTextEntry2] : 10 [FreeTextEntry7] : Lower back pain left side  [FreeTextEntry3] : Pain is only in the morning [FreeTextEntry4] : stretches

## 2023-06-02 NOTE — REASON FOR VISIT
[Initial Consultation] : an initial pain management consultation [FreeTextEntry2] : back and knee pain

## 2023-07-06 RX ORDER — NEBIVOLOL 5 MG/1
5 TABLET ORAL
Qty: 90 | Refills: 3 | Status: ACTIVE | COMMUNITY
Start: 2020-07-09 | End: 1900-01-01

## 2023-07-07 ENCOUNTER — APPOINTMENT (OUTPATIENT)
Dept: PAIN MANAGEMENT | Facility: CLINIC | Age: 78
End: 2023-07-07
Payer: MEDICARE

## 2023-07-07 VITALS
SYSTOLIC BLOOD PRESSURE: 122 MMHG | WEIGHT: 145 LBS | DIASTOLIC BLOOD PRESSURE: 60 MMHG | BODY MASS INDEX: 20.76 KG/M2 | HEIGHT: 70 IN

## 2023-07-07 PROCEDURE — 99214 OFFICE O/P EST MOD 30 MIN: CPT

## 2023-07-07 NOTE — PHYSICAL EXAM
[Normal] : Well developed, in no acute distress, alert and oriented to person, place and time [Normal muscle bulk without asymmetry] : normal muscle bulk without asymmetry [FADIR Test] : positive FADIR test

## 2023-07-07 NOTE — ASSESSMENT
[FreeTextEntry1] : >> Imaging and Other Studies\par \par I personally reviewed the relevant imaging.  Discussed and explained to patient the likely source of pathology and pain.  Questions answered. MRI XR\par \par XR L Hip \par \par >> Therapy and Other Modalities\par \par start PT\par \par >> Medications\par \par acetaminophen 650mg q8h prn pain (caution <3g daily)\par  \par >> Interventions\par \par Significant component of back and leg pain likely secondary to lumbar spinal stenosis demonstrated on MRI LS.  sp  L5-S1 interlaminar epidural steroid injection \par \par Progressive right knee pain secondary to osteoarthritis refractory to conservative treatments, sp right knee steroid injection \par \par Right knee pain secondary to arthritis refractory to conservative treatments and steroid injection.  Significantly debilitating to patient.  Will schedule right knee Viscosupplementation injection x 3 \par \par r/b/a discussed\par \par \par \par \par >> Consults\par \par na\par \par >> Discussion of Risks/Benefits/Alternatives\par \par 	>Regarding any scheduled procedures:\par \par I have discussed in detail with the patient that any interventional pain procedure is associated with potential risks.  The procedure may include an injection of steroids and potentially other medications (local anesthetic and normal saline) into the epidural space or surrounding tissue of the spine.  There are significant risks of this procedure which include and are not limited to infection, bleeding, worsening pain, dural puncture leading to postdural puncture headache, nerve damage, spinal cord injury, paralysis, stroke, and death.  \par \par There is a chance that the procedure does not improve their pain.  \par \par There are risks associated with the steroid being absorbed into the body systemically.  These include dysphoria, difficulty sleeping, mood swings and personality changes.  Premenopausal women may notice an irregularity in her menstrual cycle for 2-3 months following the injection.  Steroids can specifically affect patients with hypertension, diabetes, and peptic ulcers.  The procedure may cause a temporary increase in blood pressure and blood pressure, and may adversely affect a peptic ulcer.  Other, more rare complications, include avascular necrosis of joints, glaucoma and worsening of osteoporosis. \par \par I have discussed the risks of the procedure at length with the patient, and the potential benefits of pain relief.  I have offered alternatives to the procedure.  All questions were answered.  \par \par The patient expressed understanding and wishes to proceed with the procedure.\par \par 	>Regarding COVID19 Pandemic: \par \par Any planned interventional pain procedure are scheduled because further delay may cause harm or negative outcome to patient.  The goal in performing this procedure is to avoid deterioration of function, emergency room visits (which increases exposure) and reliance on opioids.  \par \par r/b/a discussed with patient, lack of evidence to conclusively determine whether pain management procedures have any positive or negative impact on the possibility of ender the virus and/or development of any sequelae. \par \par Patient counselled regarding timing steroid based intervention 2 weeks before or after COVID-19 vaccine administration to avoid any interaction or affect on efficacy of vaccination\par \par Patient demonstrates understanding\par \par Informed patient that risks associated with the COVID-19 infection.  Informed patient steps taken to limit the risks.  We are implementing safety precautions and following protocols consistent with the CDC and state recommendations. All patients and staff will be checked for fever or signs of illness upon entry to the facility. We will limit our steroid dose to the lowest effective therapeutic dose or in some cases steroids will not be injected at all. \par \par Patient agrees to proceed\par \par >> Conclusion\par \par The above diagnosis and treatment plan is medically reasonable and necessary based on the patient encounter \par There were no barriers to communication.\par Informed patient that I would be available for any additional questions.\par Patient was instructed to call with any worsening symptoms including severe pain, new numbness/weakness, or changes in the bowel/bladder function. \par Discussed role of nsaids in pain management and all relevant risks, if patient is continuing to require after 4 weeks the patient should f/u for alternative treatment. \par Instructed patient to maintain pain diary to monitor pain level, mobility, and function.\par \par \par

## 2023-07-07 NOTE — HISTORY OF PRESENT ILLNESS
[Back Pain] : back pain [___ mths] : [unfilled] month(s) ago [Constant] : constant [3] : a current pain level of 3/10 [4] : a minimum pain level of 4/10 [6] : a maximum pain level of 6/10 [Aching] : aching [Shooting] : shooting [Laying] : laying [Standing] : standing [Walking] : walking [Rest] : rest [Other: ___] : [unfilled] [FreeTextEntry1] : Interval Note:\par \par sp  L5-S1 interlaminar epidural steroid injection 5/19/23 and right knee steroid injection 5/18/23 with significant improvement in back and leg pain.  patient able to enjoy trip to [place], perform adls without pain. \par Denies any additional weakness, numbness, bowel/bladder dysfunction. Patient reports right knee pain restarting and affecting adls at this point.  In addition he complains of left groin pain with ambulation. \par \par \par \par HPI\par \par  \par \par Mr. FREDY CORONADO is a 78 year M on baby ASA with pmhx of CAD, DM, Hyperlipidemia, ADRIÁN, Asthma, and HTN. C/o worsening chronic left low back pain, worst overnight and on rising. Gets better throughout the day. Pain so severe it impacts his ability to sleep. Radiates to right back at times. Pain does not radiate to the legs. Denies any additional weakness, numbness, bowel/bladder dysfunction.\par \par \par Previous and current pain medications/doses/effects: \par \par tylenol \par \par Previous Pain Treatments:\par \par  exercises\par \par Previous Pain Injections:\par  L5-S1 interlaminar epidural steroid injection 5/19/23\par  right knee steroid injection 5/18/23\par \par Previous Diagnostic Studies/Images:\par \par MRI LS 4/23\par \par LOCALIZER: No additional findings.\par  BONES: There is no fracture or bone marrow edema.\par  ALIGNMENT: There is moderate dextrocurvature of the thoracolumbar junction. There is grade 1\par anterolisthesis at L4-L5.\par  SACROILIAC JOINTS/SACRUM: There is no sacral fracture. The SI joints are partially visualized but\par are intact.\par  CONUS AND CAUDA EQUINA: The distal cord and conus are normal in signal. Conus terminates at L1.\par  VISUALIZED INTRAPELVIC/INTRA-ABDOMINAL SOFT TISSUES: No significant change in multiple hepatic and\par bilateral cysts\par  PARASPINAL SOFT TISSUES: Normal.\par \par \par  INDIVIDUAL LEVELS:\par \par  LOWER THORACIC SPINE: No spinal canal or neuroforaminal stenosis.\par \par  L1-L2: Broad-based posterior disc bulge and mild to moderate bilateral facet arthropathy result in\par moderate to severe left neural foraminal narrowing, mild to moderate right neural foraminal\par narrowing but no significant central canal narrowing.\par  L2-L3: Broad-based posterior disc bulge with superimposed left intraforaminal protrusion in\par conjunction with moderate to severe bilateral facet arthropathy (left greater than right) results in\par mild right neural foraminal narrowing, severe left neural foraminal narrowing and severe central\par canal narrowing. There is impingement of the bilateral descending [id number] roots within the right and\par left lateral recesses.\par  L3-L4: Broad-based posterior disc bulge with superimposed left intraforaminal protrusion in\par conjunction with severe bilateral facet arthropathy (left greater than right) result in severe\par central canal narrowing, moderate right neural foraminal narrowing and severe left neural foraminal\par narrowing.There is impingement of the bilateral descending [id number] roots within the right and left\par lateral recesses.\par  L4-L5: Grade 1 anterolisthesis. Broad-based posterior disc bulge and severe bilateral facet\par arthropathy (left greater than right) result in moderate left to severe bilateral neural foraminal\par narrowing and severe central canal narrowing. 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 There is also moderate to severe bilateral neural\par foraminal narrowing and severe central canal narrowing at L4-L5. Additional varying degrees of\par central canal and neural foraminal narrowing, as above. These findings are progressed as compared to\par prior examination dated 6/20/2018.\par \par 9/3/21  XR lumbar spine, multiple views\par \par INDICATION: Lumbar back pain\par \par TECHNIQUE: AP, bilateral oblique and lateral radiographs of lumbar spine obtained\par \par COMPARISON: June 11, 2021 lumbar spine radiograph\par \par FINDINGS:\par \par 5 nonrib-bearing lumbar-type vertebral bodies are noted. For purposes of report, inferior-most\par well-formed intervertebral disc space will be designated as L5-S1. No radiographic evidence for\par transitional lumbosacral anatomy.\par \par  Diffuse osteopenia. Mild S-shaped curvature of the lumbar spine. Lumbar vertebral body heights are\par maintained. Mild L4-L5 anterolisthesis, L3-4 retrolisthesis, L2-L3 retrolisthesis, L5-S1\par retrolisthesis there are moderate multilevel degenerative endplate changes with osteophyte\par formation, intervertebral disc space narrowing and endplate irregularity. Atherosclerotic\par calcifications of abdominal aorta. Sacrum are obscured by bowel gas.\par \par \par \par  IMPRESSION:\par \par  No radiographic evidence for lumbar compression fracture.\par  Degenerative changes as above. Diffuse osteopenia. Multilevel spondylolistheses as above.\par  Please note that, relative to radiography, MR spine imaging is a more sensitive imaging modality\par for detection of canal/foraminal stenosis and may be obtained as clinically warranted.\par \par \par \par --------------------------------------------------------------------------------------\par \par 06/20/18 MRI OF THE LUMBAR SPINE WITHOUT CONTRAST\par INDICATION: Back pain\par TECHNIQUE: Noncontrast axial T1 and T2, sagittal T1, T2, and STIR, and sagittal T2 volumetric with axial and coronal reformats.\par CONTRAST: None\par COMPARISON: No prior studies are available for comparison\par FINDINGS:\par There is a moderate dextroscoliosis of the lumbar spine. The lumbar lordosis is grossly maintained. There is mild degenerative grade 1 anterolisthesis of L4 on\par L5. The marrow signal is normal with no focal marrow replacing lesion identified. The vertebral body heights are maintained and there is no evidence of acute\par fracture or subluxation. There is no abnormal vertebral or paraspinal edema. The visualized paraspinal soft tissues demonstrate no acute abnormalities. There is\par a partially imaged cystic structure in the right upper abdomen measuring up to 7.9 cm, likely related to a large exophytic renal cyst.\par The conus medullaris terminates at L1-2 and the thecal sac terminates at S2. No abnormal signal is identified in the visualized spinal cord.\par L1-2: Moderate disc bulge. Mild facet/ligamentous hypertrophy. No significant central canal, subarticular, or foraminal stenosis.\par L2-3: Mild disc bulge. Moderate facet/ligament hypertrophy. No significant central canal, subarticular, or foraminal stenosis.\par L3-4: Mild disc bulge. Moderate facet/ligament hypertrophy. A synovial cyst projects into the posterior soft tissues from the left facet joint. Mild central\par canal stenosis. Mild bilateral subarticular zone stenosis. Moderate left foraminal stenosis and no significant right foraminal stenosis.\par L4-5: Grade 1 anterolisthesis of L4 on L5. Moderate disc bulge. Marked facet/minus hypertrophy. Moderate central canal and bilateral subarticular zone stenosis\par with some encroachment upon the cauda equina. Moderate bilateral foraminal stenosis.\par L5-S1: Moderate disc bulge. Mild facet/ligamentous hypertrophy. No significant central canal stenosis. Mild bilateral subarticular zone stenosis. No\par significant foraminal stenosis.\par IMPRESSION:\par Lumbar scoliosis and spondylosis. Moderate central canal stenosis at L4-5 with mild encroachment upon the cauda equina. Multilevel foraminal stenosis as above,\par most pronounced at L3-4 on the left and L4-5 bilaterally. No acute fracture or subluxation.\par \par \par  [FreeTextEntry2] : 12 [FreeTextEntry7] : Lower back pain left side  [FreeTextEntry3] : Pain is only in the morning [FreeTextEntry4] : stretches

## 2023-07-13 ENCOUNTER — TRANSCRIPTION ENCOUNTER (OUTPATIENT)
Age: 78
End: 2023-07-13

## 2023-07-13 ENCOUNTER — APPOINTMENT (OUTPATIENT)
Dept: PAIN MANAGEMENT | Facility: HOSPITAL | Age: 78
End: 2023-07-13

## 2023-07-20 ENCOUNTER — TRANSCRIPTION ENCOUNTER (OUTPATIENT)
Age: 78
End: 2023-07-20

## 2023-07-20 ENCOUNTER — APPOINTMENT (OUTPATIENT)
Dept: PAIN MANAGEMENT | Facility: HOSPITAL | Age: 78
End: 2023-07-20

## 2023-07-27 ENCOUNTER — TRANSCRIPTION ENCOUNTER (OUTPATIENT)
Age: 78
End: 2023-07-27

## 2023-07-27 ENCOUNTER — NON-APPOINTMENT (OUTPATIENT)
Age: 78
End: 2023-07-27

## 2023-07-27 ENCOUNTER — APPOINTMENT (OUTPATIENT)
Dept: PAIN MANAGEMENT | Facility: HOSPITAL | Age: 78
End: 2023-07-27

## 2023-07-31 ENCOUNTER — APPOINTMENT (OUTPATIENT)
Dept: CARDIOLOGY | Facility: CLINIC | Age: 78
End: 2023-07-31
Payer: MEDICARE

## 2023-07-31 VITALS
BODY MASS INDEX: 19.9 KG/M2 | DIASTOLIC BLOOD PRESSURE: 82 MMHG | SYSTOLIC BLOOD PRESSURE: 136 MMHG | HEIGHT: 70 IN | WEIGHT: 139 LBS | OXYGEN SATURATION: 95 % | HEART RATE: 57 BPM

## 2023-07-31 PROCEDURE — 93000 ELECTROCARDIOGRAM COMPLETE: CPT

## 2023-07-31 PROCEDURE — 99214 OFFICE O/P EST MOD 30 MIN: CPT

## 2023-07-31 RX ORDER — ASPIRIN ENTERIC COATED TABLETS 81 MG 81 MG/1
81 TABLET, DELAYED RELEASE ORAL DAILY
Qty: 90 | Refills: 3 | Status: ACTIVE | COMMUNITY
Start: 2023-07-31 | End: 1900-01-01

## 2023-08-05 ENCOUNTER — RX RENEWAL (OUTPATIENT)
Age: 78
End: 2023-08-05

## 2023-08-05 RX ORDER — COLCHICINE 0.6 MG/1
0.6 TABLET ORAL
Qty: 90 | Refills: 3 | Status: ACTIVE | COMMUNITY
Start: 2021-07-21 | End: 1900-01-01

## 2023-08-14 ENCOUNTER — APPOINTMENT (OUTPATIENT)
Dept: ENDOCRINOLOGY | Facility: CLINIC | Age: 78
End: 2023-08-14
Payer: MEDICARE

## 2023-08-14 VITALS
OXYGEN SATURATION: 96 % | HEIGHT: 70 IN | WEIGHT: 140 LBS | DIASTOLIC BLOOD PRESSURE: 76 MMHG | HEART RATE: 69 BPM | BODY MASS INDEX: 20.04 KG/M2 | SYSTOLIC BLOOD PRESSURE: 118 MMHG

## 2023-08-14 PROCEDURE — 99214 OFFICE O/P EST MOD 30 MIN: CPT

## 2023-08-14 RX ORDER — ASPIRIN 81 MG
81 TABLET, DELAYED RELEASE (ENTERIC COATED) ORAL
Refills: 0 | Status: DISCONTINUED | COMMUNITY
End: 2023-08-14

## 2023-08-14 RX ORDER — NITROGLYCERIN 0.4 MG/1
0.4 TABLET SUBLINGUAL
Qty: 25 | Refills: 3 | Status: DISCONTINUED | COMMUNITY
Start: 2021-09-03 | End: 2023-08-14

## 2023-08-14 RX ORDER — AMOXICILLIN 500 MG/1
500 TABLET, FILM COATED ORAL
Qty: 20 | Refills: 1 | Status: DISCONTINUED | COMMUNITY
Start: 2021-08-06 | End: 2023-08-14

## 2023-08-14 NOTE — HISTORY OF PRESENT ILLNESS
[FreeTextEntry1] : Aug 14, 2023      in person  PCP: Dr. Joe Teixeira              Card: Patricia Calle (Hx angina, elevated lipids)              GI: Dr. Damir Lopez (Hx anemia) -              Ophthalmology: Santiago Valles p [phone] f [phone]             Pod: Dr. KARIME Callahan - available             [facility]           Pain:  Dr. Mariah Murguia  - hip, knee pain.              .            CC: Diabetes since 2007 (, A1c 11.9 %) ***    3/2019  5.9;  10/2019  5.9  7/20  5.6 %             (anemia)              (L knee replaced - Dr. 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   Recent A1c in August 6.7   Diet controlled diabetes. August A1c 6.7.  : : Constitutional:  Alert, well nourished, healthy appearance, no acute distress  Eyes:  No proptosis, no stare Thyroid: Pulmonary:  No respiratory distress, no accessory muscle use; normal rate and effort Cardiac:  Normal rate Vascular:  Endocrine:  No stigmata of Cushings Syndrome Musculoskeletal:  Normal gait, no involuntary movements Neurology:  No tremors Affect/Mood/Psych:  Oriented x 3; normal affect, normal insight/judgement, normal mood  .  Imp/Plan:  FBS around 120   Diabetes currently controlled by diet.  He briefly tried out a Ruthy. Not interested in insulin     Feb 06, 2023     in person  PCP: Dr. Joe Teixeira              Card: Patricia Calle (Hx angina, elevated lipids)              GI: Dr. Damir Lopez (Hx anemia) -              Ophthalmology: Santiago Valles p [phone] f [phone]             Pod: Dr. KARIME Callahan - available             [facility]            .            CC: Diabetes since 2007 (, A1c 11.9 %) ***    3/2019  5.9;  10/2019  5.9  7/20  5.6 %             (anemia)              (L knee replaced - Dr. 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   Recent A1c in August 6.7   Diet controlled diabetes. August A1c 6.7.  His fingerstick BS checked regularly, but not available today.  Acquiesces to a trial of Ruthy 2  : : Constitutional:  Alert, well nourished, healthy appearance, no acute distress  Eyes:  No proptosis, no stare Thyroid: Pulmonary:  No respiratory distress, no accessory muscle use; normal rate and effort Cardiac:  Normal rate Vascular:  Endocrine:  No stigmata of Cushing's Syndrome Musculoskeletal:  Normal gait, no involuntary movements Neurology:  No tremors Affect/Mood/Psych:  Oriented x 3; normal affect, normal insight/judgement, normal mood  .       Sep 21, 2022     in person  PCP: Dr. Joe Teixeira              Card: Patricia Calle (Hx angina, elevated lipids)              GI: Dr. Damir Lopez (Hx anemia) -              Ophthalmology: Santiago Valles p [phone] f [phone]             Pod: Dr. KARIME Callahan - available             [facility]            .            CC: Diabetes since 2007 (, A1c 11.9 %) ***    3/2019  5.9;  10/2019  5.9  7/20  5.6 %             (anemia)              (L knee replaced - Dr. 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 August A1c 6.3  : : Constitutional:  Alert, well nourished, healthy appearance, no acute distress  Eyes:  No proptosis, no stare Thyroid:  normal to palpation Pulmonary:  No respiratory distress, no accessory muscle use; normal rate and effort Cardiac:  Normal rate Vascular:  Endocrine:  No stigmata of Cushing's Syndrome Musculoskeletal:  Normal gait, no involuntary movements Neurology:  No tremors Affect/Mood/Psych:  Oriented x 3; normal affect, normal insight/judgement, normal mood  .   Impression:  He would find CGM educational. Needs password to install kae and he is not enthusiatic about a trial.     Karsten 10, 2022    in person  PCP: Dr. Joe Teixeira              Card: Patricia Calle (Hx angina, elevated lipids)              GI: Dr. Damir Lopez (Hx anemia) -              Ophthalmology: Santiago Valles p [phone] f [phone]             Pod: Dr. KARIME Callahan - available             Ortho - Dr. Dee at [facility]            .            CC: Diabetes since 2007 (, A1c 11.9 %) ***    3/2019  5.9;  10/2019  5.9  7/20  5.6 %             (anemia)              (L knee replaced - Dr. 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 August A1c 6.3 Back bothers him and he found PT helpful. Symbicort helps with breathing.  Impression:  Doing excellent job of controlling diabetes  Plan:  A1c today ROv in six months.     : : Constitutional:  Alert, well nourished, healthy appearance, no acute distress  Eyes:  No proptosis, no stare Thyroid: Pulmonary:  No respiratory distress, no accessory muscle use; normal rate and effort Cardiac:  Normal rate Vascular:  Endocrine:  No stigmata of Cushing's Syndrome Musculoskeletal:  Normal gait, no involuntary movements Neurology:  No tremors Affect/Mood/Psych:  Oriented x 3; normal affect, normal insight/judgement, normal mood  .  Impression:   Controlling sugars. Endocrine contribution to weight loss not detected thus far..  Plan:  A1c today and ROV in 5-6 months.    Jul 26, 2021     in person  PCP: Dr. Joe Teixeira              Card: Patricia Calle (Hx angina, elevated lipids              GI: Dr. Damir Lopez (Hx anemia) -              Ophthalmology: Santiago Valles p  f [phone]             Pod: Dr. KARIME Callahan - available             Ortho - Dr. Dee at [facility]            .            CC: Diabetes since 2007 (, A1c 11.9 %) ***    3/2019  5.9;  10/2019  5.9  7/20  5.6 %             (anemia)              (L knee replaced - Dr. 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   When he ran out of Januvia, that was discontinued, so  currently the diabetes is being  controlled by diet.  Had recent Cologuard test. Feels well. Brings in fingerstick BS, mostly fasting. Fasting sugars tend to run in range of 104 mg/dl   : :                5 ft 10, 145 lb    (keeping to seafood and veggies)             Constitutional:  Alert, well nourished, healthy appearance, no acute distress  Eyes:  No proptosis, no stare Thyroid: Pulmonary:  No respiratory distress, no accessory muscle use; normal rate and effort Cardiac:  Normal rate Vascular:  Endocrine:  No stigmata of Cushing's Syndrome Musculoskeletal:  Normal gait, no involuntary movements Neurology:  No tremors Affect/Mood/Psych:  Oriented x 3; normal affect, normal insight/judgement, normal mood  .  Impression:  Diabetes appears to be well controlled.  Plan:  Records reviewed. A1c today. ROV six months.   Annual eye exam    Jan 25, 2021    in person  PCP: Dr. Joe Teixeira              Card: Patricia Calle (Hx angina, elevated lipids              GI: Dr. Damir Lopez (Hx anemia) -              Ophthalmology: Santiago Valles p  f [phone]             Pod: Dr. KARIME Cabrerar - available             Ortho - Dr. Dee at  -  [facility]            .            CC: Diabetes since 2007 (, A1c 11.9 %) ***    3/2019  5.9;  10/2019  5.9  7/20  5.6 %             (anemia)              (L knee replaced - Dr. 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   When he ran out of Januvia, that was discontinued, so  currently the diabetes is being  controlled by diet.  : : Constitutional:  Alert, well nourished, healthy appearance, no acute distress  Eyes:  No proptosis, no stare Thyroid: Pulmonary:  No respiratory distress, no accessory muscle use; normal rate and effort Cardiac:  Normal rate Vascular:  Endocrine:  No stigmata of Cushing's Syndrome Musculoskeletal:  Normal gait, no involuntary movements Neurology:  No tremors Affect/Mood/Psych:  Oriented x 3; normal affect, normal insight/judgement, normal mood  .  Impression  Diabetes well controlled by attention to diet.   Plan:  Updated labs today.     Aug 31, 2020    in person  PCP: Dr. Joe Teixeira              Card: Patricia Calle (Hx anginia, elev lipids              GI: Dr. Hayes (Hx anemia) - colonoscopy last week - [place]             Ophthalmology: Santiago Valles p  f [phone]             Pod: Dr. KARIME Callahan - available             [facility]            .            CC: Diabetes since 2007 (, A1c 11.9 %) ***    3/2019  5.9;  10/2019  5.9             (anemia)              (L knee replaced - Dr. 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  Plan:   Stop JanuMet and start Januvia 50 mg daily    Feb 24, 2020  PCP: Dr. Joe Teixeira              Card: Patricia Calle (Hx anginia, elev lipids              GI: Dr. Hayes (Hx anemia) - colonoscopy last week - [place]             Ophthalmology: Santiago Valles p  f [phone]             Pod: Dr. KARIME Callahan - available             [facility]            .            CC: Diabetes since 2007 (, A1c 11.9 %) ***    3/2019  5.9;  10/2019  5.9             (anemia)              (L knee replaced - Dr. 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 A1c in March and Oct:  5.9 %  Impression:  Doing well.  Plan:  Updated A1c, BMP. See GI to follow up on hepatic cyst     Oct 25, 2019  PCP: Dr. Joe Teixeira              Card: Patricia Calle (Hx anginia, elev lipids              GI: Dr. Hayes (Hx anemia) - colonoscopy last week - [place]             Ophthalmology: Santiago Solareslovaldemar p [phone] f [phone]             Pod: Dr. KARIME Callahan - available             [facility]            .            CC: Diabetes since 2007 (, A1c 11.9 %) ***             (anemia)              (L knee replaced - Dr. 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 NR Last A1c in March 2019 was 5.9 %  Impression:  By history, doing well.  Last saw ophthalmology during the summer.  Plan:  Updated A1c today. ROV in March or April with a few records.       March 18, 2019   PCP: Dr. Joe Teixeira              Card: Patricia Calle (Hx anginia, elev lipids              GI: Dr. Hayes (Hx anemia) - colonoscopy last week - [place]             Ophthalmology: Santiago Orloff p  f [phone]             Pod: Dr. KARIME Callahan - available             [facility]            .            CC: Diabetes since 2007 (, A1c 11.9 %) ***             (anemia)              (L knee replaced - Dr. Lopez at [facility])              (sleep challenges)             (ASHD)             ( 6/2018: back pain: scoliosis)             (6/2018: hepatic cyst)  At [place] on 3/5/2019 A1c 5.9 B12 596 ferritin 30 () WBC 4.18 Hct 41.3 serum iron 103      Previous notes from [employer] appended below.     Sept 17, 2018            .            PCP: Dr. Joe Teixeira              Card: Patricia Calle (Hx anginia, elev lipids              GI: Dr. Hayes (Hx anemia) - colonoscopy last week - [place]             Ophthalmology: Santiago Orloff p  f [phone]             Pod: Dr. KARIME Callahan - available             [facility]            .            CC: Diabetes since 2007 (, A1c 11.9 %) ***             (anemia)              (L knee replaced - Dr. Lopez at [facility])              (sleep challenges)             (ASHD)             ( 6/2018: back pain: scoliosis)             (6/2018: hepatic cyst)            .            5/16/2018 X-ray R hip: mild bilateral hip arthopathy....arterial vasc. calcifications,             .            6/20/2018 ER visit for back pain            -incidental . hepatic cyist detected            .            9/7/2018 [facility] included            microalbumin - 5            Hct 40.3            MCV 82.4             A1c 5.8 %              m/gdl            creatinine 1.12            calcium 9.7 *****            uric acid 5.3             GGT 24                        ESR 2            PTH 40            RF neg             immunofix: neg             Lyme - neg            25 OH vit D 45            PSA 2.1             1,25 di OH vit D 47 (18-72)            ionized calcium normal            .            Impression: He is on ferrous gluconate for anemia and he would like to know his ferritin and Fe/TIBC.            .            Eye exam was excellent. .            .            ==            .            May 16, 2018            .            PCP: Dr. Joe Teixeira              Card: Patricia Calle (Hx anginia, elev lipids              GI: Dr. Hayes (Hx anemia) - colonoscopy last week - [place]             Ophthalmology: Santiago Valles p  f [phone]             Pod: Dr. KARIME Callahan - available             Ortho - Dr. Dee at [facility]            .            CC: Diabetes since 2007 (, A1c 11.9 %) ***             (anemia)              (L knee replaced - Dr. Lopez at [facility])              (sleep challenges)             (ASHD)            .            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            Returns regarding diabetes.            Has been taking iron because of anemia.            Went for colonoscopy.            Declined upper endoscopy.             .            Impression: Feeling well.            Slightly elevated [facility] calcium.             A1c in good range.            .            Plan: Same Rx.             .            ==            .            January 16, 2018            .            PCP: Dr. Joe Teixeira              Card: Patricia Calle (Hx anginia, elev lipids              GI: Dr. Hayes (Hx anemia) - colonoscopy last week - [place]             Ophthalmology: Santiago Valles p  f [phone]             Pod: Dr. KARIME Callahan - available             Ortho - Dr. Dee at [facility]            .            CC: Diabetes since 2007 (, A1c 11.9 %) ***             (anemia)              (L knee replaced - Dr. Lopez at [facility])              (sleep challenges)             (ASHD)            .            Reports he saw ophthalmology a few months ago.            Currently has cough - saw Dr. Teixeira.            Has funny feeling in toes.             .            Impresion: He appears to be doing great !            .            ==            .            August 8, 2017            .            PCP: Dr. Joe Teixeira              Card: Patricia Calle (Hx anginia, elev lipids              GI: Dr. Hayes (Hx anemia) - colonoscopy last week - [place]             Ophthalmology: Santiago Valles [facility] tomorrow             Pod: Dr. KARIME Callahan - available             Ortho - Dr. Dee at [facility]            .            CC: Diabetes since 2007 (, A1c 11.9 %) ***             (anemia)              (L knee replaced - Dr. Lopez at [facility])              (sleep challenges)             (ASHD)            .            Had colonoscopy about a year ago by Dr. Hayes.            Declined upper endoscopy.            Took ferrous gluconate.            Recent labs at [facility] on            7/25/2017 included            BS 98            TSH 2.29             Hct 42.7            MCV 79.7            A1c 5.9            .            .            Impression: Diabetes under excellent control based on his own fingerstick sugars as well as recent A1c of 5.9%            .            Plan: He will see Dr. Teixeira very soon.            ROV in about six months. Thank you. -            .            ==            .            Feb 8, 2017            .            PCP: Dr. Joe Teixeira              Card: Patricia Calle (Hx anginia, elev lipids              GI: Dr. Hayes (Hx anemia) - colonoscopy last week - [place]             Ophthalmology: [facility] tomorrow             Pod: Dr. KARIME Callahan - available             Ortho - Dr. Dee at [facility]            .            CC: Diabetes since 2007 (, A1c 11.9 %) ***             (anemia)              (L knee replaced - Dr. Lopez at [facility])              (sleep challenges)             (ASHD)            .            On Bystolic and            JanuMet  BID for diabetes.            .            Recent [facility] labs A1c 6.2 %            urine microalbu 0.6             .            Impression: Doing very well.            .            Plan: Reviewed fingerstick BS and guidelines.            Need for annual eye exam.             .            ==            .            August 8, 2016            .            PCP: Dr. Joe Teixeira              Card: Patricia Calle (Hx anginia, elev lipids              GI: Dr. Hayes (Hx anemia) - colonoscopy last week - [place]             Ophthalmology: [facility] tomorrow             Pod: Dr. KARIME Callahan - available             Ortho - Dr. Dee at [facility]            .            CC: Diabetes since 2007 (, A1c 11.9 %) ***             (anemia)              (L knee replaced - Dr. Lopez at [facility])              (sleep challenges)             (ASHD)            .            Recent labs ([facility]) show             mg/dl            B12 461             HbA1c 6.3 %            .            Impression: Doing excellent job of controlling sugars.            .            Plan: Same Rx: JanuMet, diet, exercise.             .            ==            .            February 15, 2016            .            PCP: Dr. Joe Teixeira              Card: Patricia Calle (Hx anginia, elev lipids              GI: Dr. Hayes (Hx anemia)             Ophthalmology: [facility] good report             Pod: Dr. KARIME Callahan             .            CC: Diabetes since 2007 (, A1c 11.9 %) ***             (anemia)              (L knee replaced - Dr. Lopez at [facility])              (sleep challenges)             (ASHD)            .            Had recent evaluation for possible angina.            Studies turned out well.            Bystolic and isosorbide were prescribe.            Plans trip to [facility].            .            Eyes last checked a few months ago and was given a good report.             .            JanuMet  BID [facility] 60 days at a time            .            A1c 6.2 % (up from 6.0%)            .            Impression: Doing very well.            .            Plan: Restart fingerstick BS monitoring.             Updated A1c, B12, lipids in 4 months.             .            .            ==            .            August 19, 2015            .            PCP: Dr. Joe Teixeira              Card: Patricia Calle (Hx anginia, elev lipids              GI: Dr. Hayes (Hx anemia)             Ophthalmology: [facility] good report             Pod: Dr. KARIME Callahan             .            CC: Diabetes since 2007 (, A1c 11.9 %) ***             (anemia) (L knee replaced - Dr. Lopez at [facility])             (sleep challenges)            .            Has an autographed copy of text by Bola Tim            Recent FBS 78 mg/dl            HbA1c 6.0%            .            Takes JanuMet  BID            .            Impression: Doing well. Notes some stomach rumbling so I will advise trial of decreasing the JanuMet to daily and repeating labs in about six months.            .            .            Plan: Same Rx and ROV six months.             .            ==            .            Feb 6, 2015            .            PCP: Dr. Joe Teixeira              Card: Patricia Calle (Hx anginia, elev lipids              GI: Dr. Hayes (Hx anemia)             Ophthalmology: [facility]              Pod: Dr. Callahan            .            CC: Diabetes since 2007 (, A1c 11.9 %)             (anemia) (L knee replaced - Dr. Lopez at [facility])             (Lyme disease)             (sleep challenges)            .            Now teaching genomics.            .            Lab Jan 2015            FBS 99,             Urine microalbumin normal            HDL 42            LDL 64            TSH 1.68 Hct 43.7            A1c 6.5%            .            He admits that he exercises a lot and eats properly.            .            Meds for DM: JanuMet  BID            Atovastin 10 mg            Amlodipine 5 mg            Hyzaar - HCTZ            ASA 81 mg/dl            Symbacort            .            Impression: Diabetes well controlled.             Tests fingerstick BS about once a week.            .            Plan: Same Rx and ROV 6 months.            .            .            .            .            ====            Dec 18, 2013            PCP: Dr. Teixeira            CC: DM since 2007            .            Summary of history below.            Going to [place] as a friend there is quite ill.             Says FBS may be 100 - 115.             FBS has drifted up a bit.            Will consider adding glipizide ER 2.5 mg in PM and            continue Janumet 50/500 BID for now.            ..            ===            May 17, 2013            PCP: Dr. Joe Teixeira Card: Patricia Calle GI: Dr. Hayes             Ophthalmology: [facility] Pod: Dr. Callahan            CC: Diabetes (anemia) (L knee replaced - Dr. Lopez at [facility])            Tick bite Nov 2012 - Doxy from acupuncturist.             .            DM Dx'd by Dr. Teixeira 2007 after he developed dizziness and BS over 600 with A1c of 11.9%.            Last note appended below. Has seen Dr. Calle - has angina, heart murmur. He is supposed to restart his walking, eliptical, etc.             .            Remains on Janumet 50/500 BID Recent A1c higher at 6.7%. Urine microalbumin within normal limits. Lipids not part of this panel.            .            .            Did not f/u to GI regarding anemia, but it resolved on Fe.             He feels that muscle pain may benefit from re-starting HGH.             .            .            +++++++++            # Hx angina - Dr. Calle - on Lipitor            # Recent anemia - colonoscopy by Dr. Hayes, declined upper endoscopy, Hct now back to normal            # Diabetes: on Janumet 50/500 BID. FBS in good range. A1c 6.1% Feels well. Recentlyu went to [place]. February 15, 2016            .            PCP: Dr. Joe Teixeira              Card: Patricia Calle (Hx anginia, elev lipids              GI: Dr. Hayes (Hx anemia)             Ophthalmology: [facility] good report             Pod: Dr. KARIME Callahan             .            CC: Diabetes since 2007 (, A1c 11.9 %) ***             (anemia)              (L knee replaced - Dr. Lopez at [facility])              (sleep challenges)             (ASHD)            .            Had recent evaluation for possible angina.            Studies turned out well.            Bystolic and isosorbide were prescribe.            Plans trip to [facility].            .            Eyes last checked a few months ago and was given a good report.             .            JanuMet  BID [facility] 60 days at a time            .            A1c 6.2 % (up from 6.0%)            .            Impression: Doing very well.            .            Plan: Restart fingerstick BS monitoring.             Updated A1c, B12, lipids in 4 months.             .            .            ==            .            August 19, 2015            .            PCP: Dr. Joe Teixeira              Card: Patricia Calle (Hx anginia, elev lipids              GI: Dr. Hayes (Hx anemia)             Ophthalmology: [facility] good report             Pod: Dr. KARIME Callahan             .            CC: Diabetes since 2007 (, A1c 11.9 %) ***             (anemia) (L knee replaced - Dr. Lopez at [facility])             (sleep challenges)            .            Has an autographed copy of text by Bola Tim            Recent FBS 78 mg/dl            HbA1c 6.0%            .            Takes JanuMet  BID            .            Impression: Doing well. Notes some stomach rumbling so I will advise trial of decreasing the JanuMet to daily and repeating labs in about six months.            .            .            Plan: Same Rx and ROV six months.             .            ==            .            Feb 6, 2015            .            PCP: Dr. Joe Teixeira              Card: Patricia Calle (Hx anginia, elev lipids              GI: Dr. Hayes (Hx anemia)             Ophthalmology: [facility]              Pod: Dr. Callahan            .            CC: Diabetes since 2007 (, A1c 11.9 %)             (anemia) (L knee replaced - Dr. Lopez at [facility])             (Lyme disease)             (sleep challenges)            .            Now teaching genomics.            .            Lab Jan 2015            FBS 99,             Urine microalbumin normal            HDL 42            LDL 64            TSH 1.68 Hct 43.7            A1c 6.5%            .            He admits that he exercises a lot and eats properly.            .            Meds for DM: JanuMet  BID            Atovastin 10 mg            Amlodipine 5 mg            Hyzaar - HCTZ            ASA 81 mg/dl            Symbacort            .            Impression: Diabetes well controlled.             Tests fingerstick BS about once a week.            .            Plan: Same Rx and ROV 6 months.            .            .            .            .            ====            Dec 18, 2013            PCP: Dr. Teixeira            CC: DM since 2007            .            Summary of history below.            Going to [place] as a friend there is quite ill.             Says FBS may be 100 - 115.             FBS has drifted up a bit.            Will consider adding glipizide ER 2.5 mg in PM and            continue Janumet 50/500 BID for now.            ..            ===            May 17, 2013            PCP: Dr. Joe Teixeira Card: Patricia Calle GI: Dr. Hayes             Ophthalmology: [facility] Pod: Dr. Callahan            CC: Diabetes (anemia) (L knee replaced - Dr. Lopez at [facility])            Tick bite Nov 2012 - Doxy from acupuncturist.             .            DM Dx'd by Dr. Teixeira 2007 after he developed dizziness and BS over 600 with A1c of 11.9%.            Last note appended below. Has seen Dr. Calle - has angina, heart murmur. He is supposed to restart his walking, eliptical, etc.             .            Remains on Janumet 50/500 BID Recent A1c higher at 6.7%. Urine microalbumin within normal limits. Lipids not part of this panel.            .            .            Did not f/u to GI regarding anemia, but it resolved on Fe.             He feels that muscle pain may benefit from re-starting HGH.             .            .            +++++++++            # Hx angina - Dr. Calle - on Lipitor            # Recent anemia - colonoscopy by Dr. Hayes, declined upper endoscopy, Hct now back to normal            # Diabetes: on Janumet 50/500 BID. FBS in good range. A1c 6.1% Feels well. Recentlyu went to [place]. February 15, 2016            .            PCP: Dr. Joe Teixeira              Card: Patricia Calle (Hx anginia, elev lipids              GI: Dr. Hayes (Hx anemia)             Ophthalmology: [facility] good report             Pod: Dr. KARIME Callahan             .            CC: Diabetes since 2007 (, A1c 11.9 %) ***             (anemia)              (L knee replaced - Dr. Lopez at [facility])              (sleep challenges)             (ASHD)            .            Had recent evaluation for possible angina.            Studies turned out well.            Bystolic and isosorbide were prescribe.            Plans trip to [facility].            .            Eyes last checked a few months ago and was given a good report.             .            JanuMet  BID [facility] [address] 60 days at a time            .            A1c 6.2 % (up from 6.0%)            .            Impression: Doing very well.            .            Plan: Restart fingerstick BS monitoring.             Updated A1c, B12, lipids in 4 months.             .            .            ==            .            August 19, 2015            .            PCP: Dr. Joe Teixeira              Card: Patricia Calle (Hx anginia, elev lipids              GI: Dr. Hayes (Hx anemia)             Ophthalmology: [facility] good report             Pod: Dr. KARIME Callahan             .            CC: Diabetes since 2007 (, A1c 11.9 %) ***             (anemia) (L knee replaced - Dr. Lopez at [facility])             (sleep challenges)            .            Has an autographed copy of text by Bola Tim            Recent FBS 78 mg/dl            HbA1c 6.0%            .            Takes JanuMet  BID            .            Impression: Doing well. Notes some stomach rumbling so I will advise trial of decreasing the JanuMet to daily and repeating labs in about six months.            .            .            Plan: Same Rx and ROV six months.             .            ==            .            Feb 6, 2015            .            PCP: Dr. Joe Teixeira              Card: Patricia Calle (Hx anginia, elev lipids              GI: Dr. Hayes (Hx anemia)             Ophthalmology: [facility]              Pod: Dr. Callahan            .            CC: Diabetes since 2007 (, A1c 11.9 %)             (anemia) (L knee replaced - Dr. Lopez at [facility])             (Lyme disease)             (sleep challenges)            .            Now teaching genomics.            .            Lab Jan 2015            FBS 99,             Urine microalbumin normal            HDL 42            LDL 64            TSH 1.68 Hct 43.7            A1c 6.5%            .            He admits that he exercises a lot and eats properly.            .            Meds for DM: JanuMet  BID            Atovastin 10 mg            Amlodipine 5 mg            Hyzaar - HCTZ            ASA 81 mg/dl            Symbacort            .            Impression: Diabetes well controlled.             Tests fingerstick BS about once a week.            .            Plan: Same Rx and ROV 6 months.            .            .            .            .            ====            Dec 18, 2013            PCP: Dr. Teixeira            CC: DM since 2007            .            Summary of history below.            Going to [place] as a friend there is quite ill.             Says FBS may be 100 - 115.             FBS has drifted up a bit.            Will consider adding glipizide ER 2.5 mg in PM and            continue Janumet 50/500 BID for now.            ..            ===            May 17, 2013            PCP: Dr. Joe Teixeira Card: Patricia Calle GI: Dr. Hayes             [facility] Pod: Dr. Callahan            CC: Diabetes (anemia) (L knee replaced - Dr. Lopez at [facility])            Tick bite Nov 2012 - Doxy from acupuncturist.             .            DM Dx'd by Dr. Teixeira 2007 after he developed dizziness and BS over 600 with A1c of 11.9%.            Last note appended below. Has seen Dr. Calle - has angina, heart murmur. He is supposed to restart his walking, eliptical, etc.             .            Remains on Janumet 50/500 BID Recent A1c higher at 6.7%. Urine microalbumin within normal limits. Lipids not part of this panel.            .            .            Did not f/u to GI regarding anemia, but it resolved on Fe.             He feels that muscle pain may benefit from re-starting HGH.             .            .            +++++++++            # Hx angina - Dr. Calle - on Lipitor            # Recent anemia - colonoscopy by Dr. Hayes, declined upper endoscopy, Hct now back to normal            # Diabetes: on Janumet 50/500 BID. FBS in good range. A1c 6.1% Feels well. Recentlyu went to [place]

## 2023-09-08 RX ORDER — AMLODIPINE BESYLATE 5 MG/1
5 TABLET ORAL DAILY
Qty: 90 | Refills: 3 | Status: ACTIVE | COMMUNITY
Start: 2021-06-21 | End: 1900-01-01

## 2023-09-08 RX ORDER — LOSARTAN POTASSIUM AND HYDROCHLOROTHIAZIDE 25; 100 MG/1; MG/1
100-25 TABLET ORAL
Qty: 90 | Refills: 3 | Status: ACTIVE | COMMUNITY
Start: 2019-06-12 | End: 1900-01-01

## 2023-09-29 ENCOUNTER — RX RENEWAL (OUTPATIENT)
Age: 78
End: 2023-09-29

## 2023-09-29 ENCOUNTER — APPOINTMENT (OUTPATIENT)
Dept: PAIN MANAGEMENT | Facility: CLINIC | Age: 78
End: 2023-09-29
Payer: MEDICARE

## 2023-09-29 VITALS
DIASTOLIC BLOOD PRESSURE: 76 MMHG | HEIGHT: 70 IN | WEIGHT: 140 LBS | BODY MASS INDEX: 20.04 KG/M2 | SYSTOLIC BLOOD PRESSURE: 125 MMHG

## 2023-09-29 DIAGNOSIS — M48.061 SPINAL STENOSIS, LUMBAR REGION WITHOUT NEUROGENIC CLAUDICATION: ICD-10-CM

## 2023-09-29 DIAGNOSIS — M47.817 SPONDYLOSIS W/OUT MYELOPATHY OR RADICULOPATHY, LUMBOSACRAL REGION: ICD-10-CM

## 2023-09-29 DIAGNOSIS — M79.18 MYALGIA, OTHER SITE: ICD-10-CM

## 2023-09-29 DIAGNOSIS — M17.9 OSTEOARTHRITIS OF KNEE, UNSPECIFIED: ICD-10-CM

## 2023-09-29 DIAGNOSIS — G89.4 CHRONIC PAIN SYNDROME: ICD-10-CM

## 2023-09-29 PROCEDURE — 99214 OFFICE O/P EST MOD 30 MIN: CPT

## 2023-09-29 RX ORDER — BUDESONIDE AND FORMOTEROL FUMARATE DIHYDRATE 160; 4.5 UG/1; UG/1
160-4.5 AEROSOL RESPIRATORY (INHALATION)
Qty: 3 | Refills: 1 | Status: ACTIVE | COMMUNITY
Start: 2021-01-29 | End: 1900-01-01

## 2023-10-03 ENCOUNTER — APPOINTMENT (OUTPATIENT)
Dept: INTERNAL MEDICINE | Facility: CLINIC | Age: 78
End: 2023-10-03

## 2023-10-15 LAB
ANION GAP SERPL CALC-SCNC: 11 MMOL/L
BUN SERPL-MCNC: 15 MG/DL
CALCIUM SERPL-MCNC: 9.6 MG/DL
CHLORIDE SERPL-SCNC: 103 MMOL/L
CO2 SERPL-SCNC: 28 MMOL/L
CREAT SERPL-MCNC: 1.02 MG/DL
CREAT SPEC-SCNC: 163 MG/DL
EGFR: 74 ML/MIN/1.73M2
ESTIMATED AVERAGE GLUCOSE: 166 MG/DL
GLUCOSE SERPL-MCNC: 159 MG/DL
HBA1C MFR BLD HPLC: 7.4 %
MICROALBUMIN 24H UR DL<=1MG/L-MCNC: <1.2 MG/DL
MICROALBUMIN/CREAT 24H UR-RTO: NORMAL MG/G
POTASSIUM SERPL-SCNC: 3.6 MMOL/L
SODIUM SERPL-SCNC: 142 MMOL/L

## 2023-12-11 ENCOUNTER — APPOINTMENT (OUTPATIENT)
Dept: ENDOCRINOLOGY | Facility: CLINIC | Age: 78
End: 2023-12-11
Payer: MEDICARE

## 2023-12-11 VITALS
OXYGEN SATURATION: 97 % | HEIGHT: 70 IN | WEIGHT: 140 LBS | HEART RATE: 65 BPM | BODY MASS INDEX: 20.04 KG/M2 | SYSTOLIC BLOOD PRESSURE: 120 MMHG | DIASTOLIC BLOOD PRESSURE: 68 MMHG

## 2023-12-11 PROCEDURE — 99214 OFFICE O/P EST MOD 30 MIN: CPT

## 2024-01-18 ENCOUNTER — APPOINTMENT (OUTPATIENT)
Dept: INTERNAL MEDICINE | Facility: CLINIC | Age: 79
End: 2024-01-18

## 2024-01-30 ENCOUNTER — NON-APPOINTMENT (OUTPATIENT)
Age: 79
End: 2024-01-30

## 2024-01-31 ENCOUNTER — APPOINTMENT (OUTPATIENT)
Dept: CARDIOLOGY | Facility: CLINIC | Age: 79
End: 2024-01-31
Payer: MEDICARE

## 2024-01-31 ENCOUNTER — NON-APPOINTMENT (OUTPATIENT)
Age: 79
End: 2024-01-31

## 2024-01-31 VITALS
DIASTOLIC BLOOD PRESSURE: 70 MMHG | SYSTOLIC BLOOD PRESSURE: 122 MMHG | WEIGHT: 148 LBS | OXYGEN SATURATION: 98 % | BODY MASS INDEX: 21.24 KG/M2 | HEART RATE: 49 BPM

## 2024-01-31 DIAGNOSIS — G47.33 OBSTRUCTIVE SLEEP APNEA (ADULT) (PEDIATRIC): ICD-10-CM

## 2024-01-31 DIAGNOSIS — I25.10 ATHEROSCLEROTIC HEART DISEASE OF NATIVE CORONARY ARTERY W/OUT ANGINA PECTORIS: ICD-10-CM

## 2024-01-31 PROCEDURE — 99214 OFFICE O/P EST MOD 30 MIN: CPT

## 2024-01-31 PROCEDURE — 93000 ELECTROCARDIOGRAM COMPLETE: CPT

## 2024-01-31 NOTE — HISTORY OF PRESENT ILLNESS
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 his most recent stress test showed some ischemic change in the absence of symptoms. CTA was denied.  There have been no hospitalizations since last visit. He was taken off his diabetic medications by Dr Hellerman, now on diet and exercise alone.   He denies chest pain, dyspnea, palpitations or syncope He goes to  the gym 2 -3 times a week and an elliptical. Continues to have joint pain, hip and knee on right.

## 2024-02-01 LAB
ALBUMIN SERPL ELPH-MCNC: 4.4 G/DL
ALP BLD-CCNC: 48 U/L
ALT SERPL-CCNC: 20 U/L
ANION GAP SERPL CALC-SCNC: 10 MMOL/L
AST SERPL-CCNC: 21 U/L
BILIRUB SERPL-MCNC: 0.3 MG/DL
BUN SERPL-MCNC: 18 MG/DL
CALCIUM SERPL-MCNC: 9.2 MG/DL
CHLORIDE SERPL-SCNC: 102 MMOL/L
CHOLEST SERPL-MCNC: 106 MG/DL
CO2 SERPL-SCNC: 28 MMOL/L
CREAT SERPL-MCNC: 1.02 MG/DL
CRP SERPL HS-MCNC: 0.44 MG/L
EGFR: 74 ML/MIN/1.73M2
ESTIMATED AVERAGE GLUCOSE: 134 MG/DL
GLUCOSE SERPL-MCNC: 174 MG/DL
HBA1C MFR BLD HPLC: 6.3 %
HCT VFR BLD CALC: 44.8 %
HDLC SERPL-MCNC: 49 MG/DL
HGB BLD-MCNC: 13.6 G/DL
LDLC SERPL CALC-MCNC: 45 MG/DL
MCHC RBC-ENTMCNC: 25.4 PG
MCHC RBC-ENTMCNC: 30.4 GM/DL
MCV RBC AUTO: 83.6 FL
NONHDLC SERPL-MCNC: 57 MG/DL
PLATELET # BLD AUTO: 162 K/UL
POTASSIUM SERPL-SCNC: 3.8 MMOL/L
PROT SERPL-MCNC: 6.4 G/DL
RBC # BLD: 5.36 M/UL
RBC # FLD: 15.3 %
SODIUM SERPL-SCNC: 140 MMOL/L
TRIGL SERPL-MCNC: 53 MG/DL
TSH SERPL-ACNC: 3.02 UIU/ML
WBC # FLD AUTO: 4.17 K/UL

## 2024-02-02 ENCOUNTER — RX RENEWAL (OUTPATIENT)
Age: 79
End: 2024-02-02

## 2024-02-02 DIAGNOSIS — E78.41 ELEVATED LIPOPROTEIN(A): ICD-10-CM

## 2024-02-02 LAB — APO LP(A) SERPL-MCNC: 185 NMOL/L

## 2024-02-02 RX ORDER — ROSUVASTATIN CALCIUM 10 MG/1
10 TABLET, FILM COATED ORAL DAILY
Qty: 90 | Refills: 3 | Status: ACTIVE | COMMUNITY
Start: 2023-02-21 | End: 1900-01-01

## 2024-02-28 ENCOUNTER — APPOINTMENT (OUTPATIENT)
Dept: INTERNAL MEDICINE | Facility: CLINIC | Age: 79
End: 2024-02-28
Payer: MEDICARE

## 2024-02-28 VITALS
TEMPERATURE: 97.3 F | HEIGHT: 70 IN | WEIGHT: 146 LBS | SYSTOLIC BLOOD PRESSURE: 132 MMHG | OXYGEN SATURATION: 96 % | HEART RATE: 55 BPM | RESPIRATION RATE: 16 BRPM | DIASTOLIC BLOOD PRESSURE: 74 MMHG | BODY MASS INDEX: 20.9 KG/M2

## 2024-02-28 DIAGNOSIS — E11.59 TYPE 2 DIABETES MELLITUS WITH OTHER CIRCULATORY COMPLICATIONS: ICD-10-CM

## 2024-02-28 DIAGNOSIS — Z79.51 LONG TERM (CURRENT) USE OF INHALED STEROIDS: ICD-10-CM

## 2024-02-28 DIAGNOSIS — J45.30 MILD PERSISTENT ASTHMA, UNCOMPLICATED: ICD-10-CM

## 2024-02-28 DIAGNOSIS — R94.39 ABNORMAL RESULT OF OTHER CARDIOVASCULAR FUNCTION STUDY: ICD-10-CM

## 2024-02-28 DIAGNOSIS — I15.2 TYPE 2 DIABETES MELLITUS WITH OTHER CIRCULATORY COMPLICATIONS: ICD-10-CM

## 2024-02-28 DIAGNOSIS — E78.5 HYPERLIPIDEMIA, UNSPECIFIED: ICD-10-CM

## 2024-02-28 DIAGNOSIS — E53.8 DEFICIENCY OF OTHER SPECIFIED B GROUP VITAMINS: ICD-10-CM

## 2024-02-28 DIAGNOSIS — Z78.9 OTHER SPECIFIED HEALTH STATUS: ICD-10-CM

## 2024-02-28 DIAGNOSIS — R63.4 ABNORMAL WEIGHT LOSS: ICD-10-CM

## 2024-02-28 DIAGNOSIS — Z12.11 ENCOUNTER FOR SCREENING FOR MALIGNANT NEOPLASM OF COLON: ICD-10-CM

## 2024-02-28 DIAGNOSIS — E87.6 HYPOKALEMIA: ICD-10-CM

## 2024-02-28 DIAGNOSIS — K76.89 OTHER SPECIFIED DISEASES OF LIVER: ICD-10-CM

## 2024-02-28 DIAGNOSIS — E03.9 HYPOTHYROIDISM, UNSPECIFIED: ICD-10-CM

## 2024-02-28 DIAGNOSIS — M16.9 OSTEOARTHRITIS OF HIP, UNSPECIFIED: ICD-10-CM

## 2024-02-28 DIAGNOSIS — N40.0 BENIGN PROSTATIC HYPERPLASIA WITHOUT LOWER URINARY TRACT SYMPMS: ICD-10-CM

## 2024-02-28 DIAGNOSIS — Z00.00 ENCOUNTER FOR GENERAL ADULT MEDICAL EXAMINATION W/OUT ABNORMAL FINDINGS: ICD-10-CM

## 2024-02-28 PROCEDURE — 99215 OFFICE O/P EST HI 40 MIN: CPT | Mod: 25

## 2024-02-28 PROCEDURE — G0444 DEPRESSION SCREEN ANNUAL: CPT | Mod: 59

## 2024-02-28 PROCEDURE — 36415 COLL VENOUS BLD VENIPUNCTURE: CPT

## 2024-02-28 PROCEDURE — G0439: CPT

## 2024-02-28 RX ORDER — ALBUTEROL SULFATE 90 UG/1
108 (90 BASE) INHALANT RESPIRATORY (INHALATION)
Qty: 1 | Refills: 5 | Status: ACTIVE | COMMUNITY
Start: 2024-02-28 | End: 1900-01-01

## 2024-02-28 NOTE — HEALTH RISK ASSESSMENT
[Very Good] : ~his/her~  mood as very good [Yes] : Yes [0] : 2) Feeling down, depressed, or hopeless: Not at all (0) [PHQ-2 Negative - No further assessment needed] : PHQ-2 Negative - No further assessment needed [Patient reported colonoscopy was normal] : Patient reported colonoscopy was normal [Fully functional (bathing, dressing, toileting, transferring, walking, feeding)] : Fully functional (bathing, dressing, toileting, transferring, walking, feeding) [Fully functional (using the telephone, shopping, preparing meals, housekeeping, doing laundry, using] : Fully functional and needs no help or supervision to perform IADLs (using the telephone, shopping, preparing meals, housekeeping, doing laundry, using transportation, managing medications and managing finances) [Former] : Former [Monthly or less (1 pt)] : Monthly or less (1 point) [1 or 2 (0 pts)] : 1 or 2 (0 points) [Never (0 pts)] : Never (0 points) [No falls in past year] : Patient reported no falls in the past year [No] : In the past 12 months have you used drugs other than those required for medical reasons? No [Patient declined bone density test] : Patient declined bone density test [HIV test declined] : HIV test declined [Hepatitis C test declined] : Hepatitis C test declined [None] : None [Alone] : lives alone [Retired] : retired [Single] : single [Sexually Active] : sexually active [Feels Safe at Home] : Feels safe at home [de-identified] : Endocrinology Dr. Hellerman, urology, pain management  [de-identified] : rarely [de-identified] : 15 minutes aerobics 4 times a week and weights 15 minutes 4 times a week  [[id number]] : 0 [Behavior] : denies difficulty with behavior [Change in mental status noted] : No change in mental status noted [Reasoning] : denies difficulty with reasoning [Learning/Retaining New Information] : denies difficulty learning/retaining new information [High Risk Behavior] : no high risk behavior [Reports changes in hearing] : Reports no changes in hearing [Reports changes in vision] : Reports no changes in vision [ColonoscopyDate] : 01/21 [ColonoscopyComments] : 3 years ago--  cologuard last year. [FreeTextEntry2] : genetics   [FreeTextEntry3] : 2

## 2024-02-28 NOTE — HISTORY OF PRESENT ILLNESS
[FreeTextEntry1] : Patient here for annual wellness and to go over chronic medical issues. No new issues but patient concerned about 5 pound weight loss over the past 2 years.

## 2024-02-28 NOTE — PHYSICAL EXAM
[No Acute Distress] : no acute distress [Normal Voice/Communication] : normal voice/communication [Thyroid Normal, No Nodules] : the thyroid was normal and there were no nodules present [No Lymphadenopathy] : no lymphadenopathy [No Edema] : there was no peripheral edema [Normal] : soft, non-tender, non-distended, no masses palpated, no HSM and normal bowel sounds [de-identified] : No sinus tenderness

## 2024-02-28 NOTE — REVIEW OF SYSTEMS
[Recent Change In Weight] : ~T recent weight change [Chills] : no chills [Fever] : no fever [Nasal Discharge] : no nasal discharge [Chest Pain] : no chest pain [Palpitations] : no palpitations [Shortness Of Breath] : no shortness of breath [Wheezing] : no wheezing [Cough] : no cough [Nausea] : no nausea [Abdominal Pain] : no abdominal pain [Constipation] : no constipation [Diarrhea] : no diarrhea [Vomiting] : no vomiting [Dysuria] : no dysuria [Hematuria] : no hematuria [Frequency] : no frequency [FreeTextEntry2] : Patient pursues 5 pound weight loss in past 2 years [FreeTextEntry7] : No blood in stool

## 2024-02-29 LAB — PSA SERPL-MCNC: 1.92 NG/ML

## 2024-03-04 ENCOUNTER — NON-APPOINTMENT (OUTPATIENT)
Age: 79
End: 2024-03-04

## 2024-03-04 LAB
IF BLOCK AB SER QL: 1 AU/ML
PCA AB SER QL IF: NORMAL

## 2024-03-19 ENCOUNTER — LABORATORY RESULT (OUTPATIENT)
Age: 79
End: 2024-03-19

## 2024-03-19 ENCOUNTER — APPOINTMENT (OUTPATIENT)
Dept: PODIATRY | Facility: CLINIC | Age: 79
End: 2024-03-19
Payer: MEDICARE

## 2024-03-19 VITALS — WEIGHT: 146 LBS | HEIGHT: 70 IN | BODY MASS INDEX: 20.9 KG/M2

## 2024-03-19 DIAGNOSIS — L60.3 NAIL DYSTROPHY: ICD-10-CM

## 2024-03-19 DIAGNOSIS — L60.1 ONYCHOLYSIS: ICD-10-CM

## 2024-03-19 DIAGNOSIS — L60.2 ONYCHOGRYPHOSIS: ICD-10-CM

## 2024-03-19 PROCEDURE — 99203 OFFICE O/P NEW LOW 30 MIN: CPT

## 2024-03-19 NOTE — PHYSICAL EXAM
[General Appearance - Alert] : alert [General Appearance - In No Acute Distress] : in no acute distress [FreeTextEntry3] : Vascular exam reveals palpable pedal pulses, the foot is warm to touch, there was good capillary fill time, the skin is normal in appearance there is no evidence of vascular disease or compromise at this time [de-identified] : overall muscle strength testing is decreased, but consistant with the patients age and medical problems. Mild atrophy and overall weakness present.decreased [Skin Color & Pigmentation] : normal skin color and pigmentation [Skin Turgor] : normal skin turgor [] : no rash [Skin Lesions] : no skin lesions [Foot Ulcer] : no foot ulcer [FreeTextEntry1] : The patient has all contributing factors to onychomycosis including but not limited to thickness, subungual debris, discoloration and partial lysis and they are brittle when cut.  [Skin Induration] : no skin induration [Sensation] : the sensory exam was normal to light touch and pinprick [No Focal Deficits] : no focal deficits [Deep Tendon Reflexes (DTR)] : deep tendon reflexes were 2+ and symmetric [Motor Exam] : the motor exam was normal [Oriented To Time, Place, And Person] : oriented to person, place, and time [Affect] : the affect was normal [Impaired Insight] : insight and judgment were intact

## 2024-03-19 NOTE — PROCEDURE
[FreeTextEntry1] : A lengthy and inform a discussion with the patient regarding different types of treatments for the mycotic nail disease. I stressed clinical versus mycologic cure rates and anticipated success rates regarding topical medications oral medications as well as laser therapy. I discussed in great length with the patient the success rates and success rates anticipated. There were no guarantees given regarding any of the treatment reviewed. I did explain to the patient that there are risks to oral antifungal therapy however those risks can be greatly decreased with obtaining blood tests prior and possibly during the treatment if indicated. The patient will weigh their options and contact the office A significant and tissue sample was taken for a KOH prep and the patient will be notified of the results A significant portion of nail and nail bed debris taken for nail fungus culture Follow up appointment 2 months

## 2024-03-19 NOTE — HISTORY OF PRESENT ILLNESS
[FreeTextEntry1] : Location: Right foot more than left Duration: Many years Etiology: Unknown Past Tx: Topical and self treatment only to the left, right foot much worse Exacerbated by: Growth Prior Hx: Yes to the left foot

## 2024-03-19 NOTE — REASON FOR VISIT
[Initial Visit] : an initial visit for [Onychomycosis] : onychomycosis [FreeTextEntry2] : Right foot

## 2024-04-09 ENCOUNTER — TRANSCRIPTION ENCOUNTER (OUTPATIENT)
Age: 79
End: 2024-04-09

## 2024-06-04 ENCOUNTER — APPOINTMENT (OUTPATIENT)
Dept: PODIATRY | Facility: CLINIC | Age: 79
End: 2024-06-04
Payer: MEDICARE

## 2024-06-04 DIAGNOSIS — B35.1 TINEA UNGUIUM: ICD-10-CM

## 2024-06-04 PROCEDURE — 99213 OFFICE O/P EST LOW 20 MIN: CPT

## 2024-06-04 NOTE — PHYSICAL EXAM
[General Appearance - Alert] : alert [General Appearance - In No Acute Distress] : in no acute distress [FreeTextEntry3] : Vascular exam reveals palpable pedal pulses, the foot is warm to touch, there was good capillary fill time, the skin is normal in appearance there is no evidence of vascular disease or compromise at this time [de-identified] : overall muscle strength testing is decreased, but consistant with the patients age and medical problems. Mild atrophy and overall weakness present.decreased

## 2024-06-04 NOTE — PROCEDURE
[FreeTextEntry1] : All diagnostic test, labs, imaging, prior notes and reports (both new and recent) reviewed prior to seeing the patient and discussed at length face to face with the patient. How these results pertain to the patient, their diagnosis and treatments also reviewed. All questions asked and answered appropriately. The risks and complications of not following my recommendations d/w the patient. A lengthy and inform a discussion with the patient regarding different types of treatments for the mycotic nail disease. I stressed clinical versus mycologic cure rates and anticipated success rates regarding topical medications oral medications as well as laser therapy. I discussed in great length with the patient the success rates and success rates anticipated. There were no guarantees given regarding any of the treatment reviewed. I did explain to the patient that there are risks to oral antifungal therapy however those risks can be greatly decreased with obtaining blood tests prior and possibly during the treatment if indicated. The patient will weigh their options and contact the office follow up prn

## 2024-06-04 NOTE — HISTORY OF PRESENT ILLNESS
[FreeTextEntry1] : Location: Right foot more than left Duration: Many years Etiology: Unknown Past Tx: Topical and self treatment only to the left, right foot much worse Exacerbated by: Growth Prior Hx: Yes to the left foot  my recent treatment : KOH/FC obtained

## 2024-06-10 ENCOUNTER — APPOINTMENT (OUTPATIENT)
Dept: ENDOCRINOLOGY | Facility: CLINIC | Age: 79
End: 2024-06-10
Payer: MEDICARE

## 2024-06-10 VITALS
HEART RATE: 82 BPM | SYSTOLIC BLOOD PRESSURE: 110 MMHG | BODY MASS INDEX: 20.08 KG/M2 | HEIGHT: 70 IN | DIASTOLIC BLOOD PRESSURE: 70 MMHG | OXYGEN SATURATION: 98 % | WEIGHT: 140.25 LBS

## 2024-06-10 DIAGNOSIS — E11.59 TYPE 2 DIABETES MELLITUS WITH OTHER CIRCULATORY COMPLICATIONS: ICD-10-CM

## 2024-06-10 PROCEDURE — 36415 COLL VENOUS BLD VENIPUNCTURE: CPT

## 2024-06-10 PROCEDURE — 99215 OFFICE O/P EST HI 40 MIN: CPT

## 2024-06-10 PROCEDURE — G2211 COMPLEX E/M VISIT ADD ON: CPT

## 2024-06-10 NOTE — HISTORY OF PRESENT ILLNESS
[FreeTextEntry1] : Roshan 10, 2024     in person  PCP: Dr. Leyla Graves             Card: Patricia Calle (Hx angina, elevated lipids)              GI: Dr. Damir Lopez (Hx anemia) -              Ophthalmology: Santiago Valles p [phone] 2012             Pod: Dr. KARIME Cabrerar - available             Ortho - Dr. Dee at [facility]           Pain:  Dr. Mariah Murguia  - hip, knee pain.              .            CC: Diabetes since 2007 (, A1c 11.9 %) ***    3/2019  5.9;  10/2019  5.9  7/20  5.6 %             (anemia)              (L knee replaced - Dr. 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   He previously tried Libre2 kae on his phone    Diet controlled diabetes. August 2023  A1c 6.7. 8/2023 A1c 7.4  Feb 1, 2024A1c 6.3 %  He tried Ruthy but was not enthralled.  : : Constitutional:  Alert, well nourished, healthy appearance, no acute distress  Eyes:  No proptosis, no stare Thyroid: Pulmonary:  No respiratory distress, no accessory muscle use; normal rate and effort Cardiac:  Normal rate Vascular:  Endocrine:  No stigmata of Cushings Syndrome Musculoskeletal:  Normal gait, no involuntary movements Neurology:  No tremors Affect/Mood/Psych:  Oriented x 3; normal affect, normal insight/judgement, normal mood  . Impression:  Diabetes under very good control.essentially by attention to diet and activity.  Plan: Same Rx.   A1c today.  -    Dec 11, 2023     in person  PCP: Dr. Joe Teixeira              Card: Patricia Calle (Hx angina, elevated lipids)              GI: Dr. Damir Lopez (Hx anemia) -              Ophthalmology: Santiago Valles p [phone] 2012             Pod: Dr. KARIME Callahan - available             Ortho - Dr. Dee at [facility]           Pain:  Dr. Mariah Murguia  - hip, knee pain.              .            CC: Diabetes since 2007 (, A1c 11.9 %) ***    3/2019  5.9;  10/2019  5.9  7/20  5.6 %             (anemia)              (L knee replaced - Dr. 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   He has Libre2 kae on his phone    Diet controlled diabetes. August A1c 6.7.  He tried Ruthy but was not enthralled. Today 12/11/2023 he reports sugars by fingerstick in acceptable range.  : : Constitutional:  Alert, well nourished, healthy appearance, no acute distress  Eyes:  No proptosis, no stare Thyroid: Pulmonary:  No respiratory distress, no accessory muscle use; normal rate and effort Cardiac:  Normal rate Vascular:  Endocrine:  No stigmata of Cushings Syndrome Musculoskeletal:  Normal gait, no involuntary movements Neurology:  No tremors Affect/Mood/Psych:  Oriented x 3; normal affect, normal insight/judgement, normal mood  . Impression:  Diet has been very helpful in controlling sugars. Last eye exam a few months ago - sees annually - given a good report.   Will try Libre2 trial again     Aug 14, 2023      in person  PCP: Dr. Joe Teixeira              Card: Patricia Calle (Hx angina, elevated lipids)              GI: Dr. Damir Lopez (Hx anemia) -              Ophthalmology: Santiago Valles p  f [phone]             Pod: Dr. KARIME Callahan - available             Ortho - Dr. Dee at [facility]           Pain:  Dr. Mariah Murguia  - hip, knee pain.              .            CC: Diabetes since 2007 (, A1c 11.9 %) ***    3/2019  5.9;  10/2019  5.9  7/20  5.6 %             (anemia)              (L knee replaced - Dr. 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   Recent A1c in August 6.7   Diet controlled diabetes. August A1c 6.7.  : : Constitutional:  Alert, well nourished, healthy appearance, no acute distress  Eyes:  No proptosis, no stare Thyroid: Pulmonary:  No respiratory distress, no accessory muscle use; normal rate and effort Cardiac:  Normal rate Vascular:  Endocrine:  No stigmata of Cushings Syndrome Musculoskeletal:  Normal gait, no involuntary movements Neurology:  No tremors Affect/Mood/Psych:  Oriented x 3; normal affect, normal insight/judgement, normal mood  .  Imp/Plan:  FBS around 120   Diabetes currently controlled by diet.  He briefly tried out a Ruthy. Not interested in insulin     Feb 06, 2023     in person  PCP: Dr. Joe Teixeira              Card: Patricia Calle (Hx angina, elevated lipids)              GI: Dr. Damir Lopez (Hx anemia) -              Ophthalmology: Santiago Valles p  f [phone]             Pod: Dr. KARIME Callahan - available             Ortho - Dr. Dee at [facility]            .            CC: Diabetes since 2007 (, A1c 11.9 %) ***    3/2019  5.9;  10/2019  5.9  7/20  5.6 %             (anemia)              (L knee replaced - Dr. 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   Recent A1c in August 6.7   Diet controlled diabetes. August A1c 6.7.  His fingerstick BS checked regularly, but not available today.  Acquiesces to a trial of Ruthy 2  : : Constitutional:  Alert, well nourished, healthy appearance, no acute distress  Eyes:  No proptosis, no stare Thyroid: Pulmonary:  No respiratory distress, no accessory muscle use; normal rate and effort Cardiac:  Normal rate Vascular:  Endocrine:  No stigmata of Cushing's Syndrome Musculoskeletal:  Normal gait, no involuntary movements Neurology:  No tremors Affect/Mood/Psych:  Oriented x 3; normal affect, normal insight/judgement, normal mood  .       Sep 21, 2022     in person  PCP: Dr. Joe Teixeira              Card: Patricia Calle (Hx angina, elevated lipids)              GI: Dr. Damir Lopez (Hx anemia) -              Ophthalmology: Santiago Valles p  f [phone]             Pod: Dr. KARIME Callahan - available             Ortho - Dr. Dee at [facility]            .            CC: Diabetes since 2007 (, A1c 11.9 %) ***    3/2019  5.9;  10/2019  5.9  7/20  5.6 %             (anemia)              (L knee replaced - Dr. 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 August A1c 6.3  : : Constitutional:  Alert, well nourished, healthy appearance, no acute distress  Eyes:  No proptosis, no stare Thyroid:  normal to palpation Pulmonary:  No respiratory distress, no accessory muscle use; normal rate and effort Cardiac:  Normal rate Vascular:  Endocrine:  No stigmata of Cushing's Syndrome Musculoskeletal:  Normal gait, no involuntary movements Neurology:  No tremors Affect/Mood/Psych:  Oriented x 3; normal affect, normal insight/judgement, normal mood  .   Impression:  He would find CGM educational. Needs password to install kae and he is not enthusiatic about a trial.     Karsten 10, 2022    in person  PCP: Dr. Joe Teixeira              Card: Patricia Calle (Hx angina, elevated lipids)              GI: Dr. Damir Lopez (Hx anemia) -              Ophthalmology: Santiago Valles p  f [phone]             Pod: Dr. SCHAFFER Proner - available             Ortho - Dr. Dee at [facility]            .            CC: Diabetes since 2007 (, A1c 11.9 %) ***    3/2019  5.9;  10/2019  5.9  7/20  5.6 %             (anemia)              (L knee replaced - Dr. 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 August A1c 6.3 Back bothers him and he found PT helpful. Symbicort helps with breathing.  Impression:  Doing excellent job of controlling diabetes  Plan:  A1c today ROv in six months.     : : Constitutional:  Alert, well nourished, healthy appearance, no acute distress  Eyes:  No proptosis, no stare Thyroid: Pulmonary:  No respiratory distress, no accessory muscle use; normal rate and effort Cardiac:  Normal rate Vascular:  Endocrine:  No stigmata of Cushing's Syndrome Musculoskeletal:  Normal gait, no involuntary movements Neurology:  No tremors Affect/Mood/Psych:  Oriented x 3; normal affect, normal insight/judgement, normal mood  .  Impression:   Controlling sugars. Endocrine contribution to weight loss not detected thus far..  Plan:  A1c today and ROV in 5-6 months.    Jul 26, 2021     in person  PCP: Dr. Joe Gaffney: Patricia Calle (Hx angina, elevated lipids              GI: Dr. Damir Lopez (Hx anemia) -              Ophthalmology: Santiago Valles p [phone] f [phone]             Pod: Dr. KARIME Callahan - available             Ortho - Dr. Dee at [facility]            .            CC: Diabetes since 2007 (, A1c 11.9 %) ***    3/2019  5.9;  10/2019  5.9  7/20  5.6 %             (anemia)              (L knee replaced - Dr. 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   When he ran out of Januvia, that was discontinued, so  currently the diabetes is being  controlled by diet.  Had recent Cologuard test. Feels well. Brings in fingerstick BS, mostly fasting. Fasting sugars tend to run in range of 104 mg/dl   : :                5 ft 10, 145 lb    (keeping to seafood and veggies)             Constitutional:  Alert, well nourished, healthy appearance, no acute distress  Eyes:  No proptosis, no stare Thyroid: Pulmonary:  No respiratory distress, no accessory muscle use; normal rate and effort Cardiac:  Normal rate Vascular:  Endocrine:  No stigmata of Cushing's Syndrome Musculoskeletal:  Normal gait, no involuntary movements Neurology:  No tremors Affect/Mood/Psych:  Oriented x 3; normal affect, normal insight/judgement, normal mood  .  Impression:  Diabetes appears to be well controlled.  Plan:  Records reviewed. A1c today. ROV six months.   Annual eye exam    Jan 25, 2021    in person  PCP: Dr. Joe Teixeira              Card: Patricia Calle (Hx angina, elevated lipids              GI: Dr. Damir Lopez (Hx anemia) -              Ophthalmology: Santiago Valles p [phone] 2012             Pod: Dr. KARIME Callahan - available             Ortho - Dr. Dee at [facility]            .            CC: Diabetes since 2007 (, A1c 11.9 %) ***    3/2019  5.9;  10/2019  5.9  7/20  5.6 %             (anemia)              (L knee replaced - Dr. 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   When he ran out of Januvia, that was discontinued, so  currently the diabetes is being  controlled by diet.  : : Constitutional:  Alert, well nourished, healthy appearance, no acute distress  Eyes:  No proptosis, no stare Thyroid: Pulmonary:  No respiratory distress, no accessory muscle use; normal rate and effort Cardiac:  Normal rate Vascular:  Endocrine:  No stigmata of Cushing's Syndrome Musculoskeletal:  Normal gait, no involuntary movements Neurology:  No tremors Affect/Mood/Psych:  Oriented x 3; normal affect, normal insight/judgement, normal mood  .  Impression  Diabetes well controlled by attention to diet.   Plan:  Updated labs today.

## 2024-06-13 LAB
ANION GAP SERPL CALC-SCNC: 14 MMOL/L
BUN SERPL-MCNC: 21 MG/DL
CALCIUM SERPL-MCNC: 9.6 MG/DL
CHLORIDE SERPL-SCNC: 104 MMOL/L
CO2 SERPL-SCNC: 26 MMOL/L
CREAT SERPL-MCNC: 0.97 MG/DL
EGFR: 78 ML/MIN/1.73M2
ESTIMATED AVERAGE GLUCOSE: 143 MG/DL
GLUCOSE SERPL-MCNC: 110 MG/DL
HBA1C MFR BLD HPLC: 6.6 %
POTASSIUM SERPL-SCNC: 3.7 MMOL/L
SODIUM SERPL-SCNC: 144 MMOL/L

## 2024-06-17 NOTE — REVIEW OF SYSTEMS
[Eyeglasses] : currently wearing eyeglasses [Negative] : Heme/Lymph How Severe Is Your Skin Lesion?: moderate Has Your Skin Lesion Been Treated?: not been treated Is This A New Presentation, Or A Follow-Up?: Skin Lesion

## 2024-07-31 ENCOUNTER — NON-APPOINTMENT (OUTPATIENT)
Age: 79
End: 2024-07-31

## 2024-07-31 ENCOUNTER — APPOINTMENT (OUTPATIENT)
Dept: CARDIOLOGY | Facility: CLINIC | Age: 79
End: 2024-07-31

## 2024-07-31 ENCOUNTER — APPOINTMENT (OUTPATIENT)
Dept: CARDIOLOGY | Facility: CLINIC | Age: 79
End: 2024-07-31
Payer: MEDICARE

## 2024-07-31 VITALS
OXYGEN SATURATION: 98 % | WEIGHT: 139.38 LBS | BODY MASS INDEX: 19.95 KG/M2 | HEART RATE: 54 BPM | RESPIRATION RATE: 18 BRPM | TEMPERATURE: 97.2 F | SYSTOLIC BLOOD PRESSURE: 116 MMHG | DIASTOLIC BLOOD PRESSURE: 64 MMHG | HEIGHT: 70 IN

## 2024-07-31 DIAGNOSIS — I15.2 TYPE 2 DIABETES MELLITUS WITH OTHER CIRCULATORY COMPLICATIONS: ICD-10-CM

## 2024-07-31 DIAGNOSIS — E11.59 TYPE 2 DIABETES MELLITUS WITH OTHER CIRCULATORY COMPLICATIONS: ICD-10-CM

## 2024-07-31 DIAGNOSIS — R94.39 ABNORMAL RESULT OF OTHER CARDIOVASCULAR FUNCTION STUDY: ICD-10-CM

## 2024-07-31 DIAGNOSIS — E78.5 HYPERLIPIDEMIA, UNSPECIFIED: ICD-10-CM

## 2024-07-31 DIAGNOSIS — G47.33 OBSTRUCTIVE SLEEP APNEA (ADULT) (PEDIATRIC): ICD-10-CM

## 2024-07-31 DIAGNOSIS — I25.10 ATHEROSCLEROTIC HEART DISEASE OF NATIVE CORONARY ARTERY W/OUT ANGINA PECTORIS: ICD-10-CM

## 2024-07-31 DIAGNOSIS — E78.41 ELEVATED LIPOPROTEIN(A): ICD-10-CM

## 2024-07-31 PROCEDURE — 93000 ELECTROCARDIOGRAM COMPLETE: CPT

## 2024-07-31 PROCEDURE — 99214 OFFICE O/P EST MOD 30 MIN: CPT

## 2024-07-31 NOTE — HISTORY OF PRESENT ILLNESS
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 his most recent stress test showed some ischemic change in the absence of symptoms. CTA was denied.  There have been no hospitalizations since last visit. He was taken off his diabetic medications by Dr Hellerman, now on diet and exercise alone.   He denies chest pain, dyspnea, palpitations or syncope  He goes to  the gym 3-4  times a week and an elliptical. Continues to have joint pain, hip and knee on right.

## 2024-08-22 ENCOUNTER — APPOINTMENT (OUTPATIENT)
Dept: CARDIOLOGY | Facility: CLINIC | Age: 79
End: 2024-08-22
Payer: MEDICARE

## 2024-08-22 PROCEDURE — 36415 COLL VENOUS BLD VENIPUNCTURE: CPT

## 2024-08-23 ENCOUNTER — APPOINTMENT (OUTPATIENT)
Dept: CARDIOLOGY | Facility: CLINIC | Age: 79
End: 2024-08-23

## 2024-08-23 LAB
ANION GAP SERPL CALC-SCNC: 13 MMOL/L
BUN SERPL-MCNC: 19 MG/DL
CALCIUM SERPL-MCNC: 9.7 MG/DL
CHLORIDE SERPL-SCNC: 105 MMOL/L
CO2 SERPL-SCNC: 29 MMOL/L
CREAT SERPL-MCNC: 0.99 MG/DL
EGFR: 77 ML/MIN/1.73M2
GLUCOSE SERPL-MCNC: 100 MG/DL
POTASSIUM SERPL-SCNC: 4 MMOL/L
SODIUM SERPL-SCNC: 146 MMOL/L

## 2024-08-26 ENCOUNTER — APPOINTMENT (OUTPATIENT)
Dept: INTERNAL MEDICINE | Facility: CLINIC | Age: 79
End: 2024-08-26
Payer: MEDICARE

## 2024-08-26 VITALS
OXYGEN SATURATION: 97 % | HEIGHT: 70 IN | HEART RATE: 50 BPM | WEIGHT: 141 LBS | TEMPERATURE: 97.2 F | DIASTOLIC BLOOD PRESSURE: 80 MMHG | SYSTOLIC BLOOD PRESSURE: 140 MMHG | BODY MASS INDEX: 20.19 KG/M2

## 2024-08-26 VITALS — DIASTOLIC BLOOD PRESSURE: 78 MMHG | SYSTOLIC BLOOD PRESSURE: 132 MMHG

## 2024-08-26 DIAGNOSIS — M17.9 OSTEOARTHRITIS OF KNEE, UNSPECIFIED: ICD-10-CM

## 2024-08-26 DIAGNOSIS — Z87.898 PERSONAL HISTORY OF OTHER SPECIFIED CONDITIONS: ICD-10-CM

## 2024-08-26 DIAGNOSIS — Z23 ENCOUNTER FOR IMMUNIZATION: ICD-10-CM

## 2024-08-26 DIAGNOSIS — E11.59 TYPE 2 DIABETES MELLITUS WITH OTHER CIRCULATORY COMPLICATIONS: ICD-10-CM

## 2024-08-26 DIAGNOSIS — I15.2 TYPE 2 DIABETES MELLITUS WITH OTHER CIRCULATORY COMPLICATIONS: ICD-10-CM

## 2024-08-26 PROCEDURE — G0444 DEPRESSION SCREEN ANNUAL: CPT | Mod: 59

## 2024-08-26 PROCEDURE — 99214 OFFICE O/P EST MOD 30 MIN: CPT

## 2024-08-26 PROCEDURE — G2211 COMPLEX E/M VISIT ADD ON: CPT

## 2024-08-26 NOTE — PHYSICAL EXAM
[No Acute Distress] : no acute distress [Normal Voice/Communication] : normal voice/communication [Normal] : normal rate, regular rhythm, normal S1 and S2 and no murmur heard [No Edema] : there was no peripheral edema [de-identified] : Right knee enlarged, swollen.  No erythema.

## 2024-08-26 NOTE — HISTORY OF PRESENT ILLNESS
[FreeTextEntry1] : Patient to follow-up on chronic conditions.  No complaints except for significant right knee pain.  Pain is relieved with orthopedics injection but states he was told he needs a replacement.  Patient cannot walk more than half a block to a block without being in severe pain.-Surgery for now.

## 2024-08-26 NOTE — REVIEW OF SYSTEMS
[Joint Pain] : joint pain [Joint Stiffness] : joint stiffness [Joint Swelling] : joint swelling [Nasal Discharge] : no nasal discharge [Chest Pain] : no chest pain [Shortness Of Breath] : no shortness of breath [Cough] : no cough [Abdominal Pain] : no abdominal pain [Nausea] : no nausea [Diarrhea] : no diarrhea [Vomiting] : no vomiting

## 2024-08-26 NOTE — HEALTH RISK ASSESSMENT
[Yes] : Yes [Monthly or less (1 pt)] : Monthly or less (1 point) [1 or 2 (0 pts)] : 1 or 2 (0 points) [Never (0 pts)] : Never (0 points) [No] : In the past 12 months have you used drugs other than those required for medical reasons? No [0] : 2) Feeling down, depressed, or hopeless: Not at all (0) [PHQ-2 Negative - No further assessment needed] : PHQ-2 Negative - No further assessment needed [Former] : Former [< 15 Years] : < 15 Years [Audit-CScore] : 1 [[id number]] : 0

## 2024-09-05 ENCOUNTER — RESULT REVIEW (OUTPATIENT)
Age: 79
End: 2024-09-05

## 2024-09-05 DIAGNOSIS — M47.817 SPONDYLOSIS W/OUT MYELOPATHY OR RADICULOPATHY, LUMBOSACRAL REGION: ICD-10-CM

## 2024-09-05 DIAGNOSIS — I71.9 AORTIC ANEURYSM OF UNSPECIFIED SITE, W/OUT RUPTURE: ICD-10-CM

## 2024-09-06 ENCOUNTER — RESULT REVIEW (OUTPATIENT)
Age: 79
End: 2024-09-06

## 2024-09-10 ENCOUNTER — APPOINTMENT (OUTPATIENT)
Dept: ENDOCRINOLOGY | Facility: CLINIC | Age: 79
End: 2024-09-10
Payer: MEDICARE

## 2024-09-10 VITALS
HEIGHT: 70 IN | SYSTOLIC BLOOD PRESSURE: 126 MMHG | BODY MASS INDEX: 20.04 KG/M2 | WEIGHT: 140 LBS | DIASTOLIC BLOOD PRESSURE: 70 MMHG | OXYGEN SATURATION: 98 % | HEART RATE: 53 BPM

## 2024-09-10 DIAGNOSIS — E11.59 TYPE 2 DIABETES MELLITUS WITH OTHER CIRCULATORY COMPLICATIONS: ICD-10-CM

## 2024-09-10 PROCEDURE — 99215 OFFICE O/P EST HI 40 MIN: CPT

## 2024-09-10 NOTE — HISTORY OF PRESENT ILLNESS
[FreeTextEntry1] : Sep 10, 2024     in person  PCP: Dr. Leyla Graves             Pulm:  Dr. Joe Teixeira             Card: Patricia Calle (Hx angina, elevated lipids)              GI: Dr. Damir Lopez (Hx anemia) -              Ophthalmology: Santiago Valles p [phone] f [phone]             Pod: Dr. KARIME Cabrerar - available             Ortho - Dr. Dee at [facility]           Pain:  Dr. Mariah Murguia  - hip, knee pain.              .            CC: Diabetes since 2007 (, A1c 11.9 %) ***    3/2019  5.9;  10/2019  5.9  7/20  5.6 %  8/2023 7.4;  6/2024 6.6             (anemia)              (L knee replaced - Dr. Lopez at 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   He previously tried Libre2 kae on his phone    Diet controlled diabetes. August 2023  A1c 6.7. 8/2023 A1c 7.4  Feb 1, 2024A1c 6.3 %  He tried Ruthy but was not enthralled.  : : Constitutional:  Alert, well nourished, healthy appearance, no acute distress  Eyes:  No proptosis, no stare Thyroid: Pulmonary:  No respiratory distress, no accessory muscle use; normal rate and effort Cardiac:  Normal rate Vascular:  Endocrine:  No stigmata of Cushings Syndrome Musculoskeletal:  Normal gait, no involuntary movements Neurology:  No tremors Affect/Mood/Psych:  Oriented x 3; normal affect, normal insight/judgement, normal mood  . Impression:  Diabetes under very good control, especially for 80 yo, by attention to diet. Plan:  Same Rx.   Should  "dual use" medications such as SGLT-2 inhibitor be  requested by other disciplines, such as cardiology, that should not be a problem for diabetes control.         Roshan 10, 2024     in person  PCP: Dr. Leyla Graves             Card: Patricia Calle (Hx angina, elevated lipids)              GI: Dr. Damir Lopez (Hx anemia) -              Ophthalmology: Santiago Valles p [phone] f [phone]             Pod: Dr. KARIME Callahan - available             Ortho - Dr. Dee at [facility]           Pain:  Dr. Mariah Murguia  - hip, knee pain.              .            CC: Diabetes since 2007 (, A1c 11.9 %) ***    3/2019  5.9;  10/2019  5.9  7/20  5.6 %             (anemia)              (L knee replaced - Dr. Lopez at 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   He previously tried Libre2 kae on his phone    Diet controlled diabetes. August 2023  A1c 6.7. 8/2023 A1c 7.4  Feb 1, 2024A1c 6.3 %  He tried Ruthy but was not enthralled.  : : Constitutional:  Alert, well nourished, healthy appearance, no acute distress  Eyes:  No proptosis, no stare Thyroid: Pulmonary:  No respiratory distress, no accessory muscle use; normal rate and effort Cardiac:  Normal rate Vascular:  Endocrine:  No stigmata of Cushings Syndrome Musculoskeletal:  Normal gait, no involuntary movements Neurology:  No tremors Affect/Mood/Psych:  Oriented x 3; normal affect, normal insight/judgement, normal mood  . Impression:  Diabetes under very good control.essentially by attention to diet and activity.  Plan: Same Rx.   A1c today.  -    Dec 11, 2023     in person  PCP: Dr. Joe Teixeira              Card: Patricia Calle (Hx angina, elevated lipids)              GI: Dr. Damir Lopez (Hx anemia) -              Ophthalmology: Santiago Valles p  f [phone]             Pod: Dr. KARIME Cabrerar - available             Ortho - Dr. Dee at [facility]           Pain:  Dr. Mariah Murguia  - hip, knee pain.              .            CC: Diabetes since 2007 (, A1c 11.9 %) ***    3/2019  5.9;  10/2019  5.9  7/20  5.6 %             (anemia)              (L knee replaced - Dr. Lopez at 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   He has Libre2 kae on his phone    Diet controlled diabetes. August A1c 6.7.  He tried Ruthy but was not enthralled. Today 12/11/2023 he reports sugars by fingerstick in acceptable range.  : : Constitutional:  Alert, well nourished, healthy appearance, no acute distress  Eyes:  No proptosis, no stare Thyroid: Pulmonary:  No respiratory distress, no accessory muscle use; normal rate and effort Cardiac:  Normal rate Vascular:  Endocrine:  No stigmata of Cushings Syndrome Musculoskeletal:  Normal gait, no involuntary movements Neurology:  No tremors Affect/Mood/Psych:  Oriented x 3; normal affect, normal insight/judgement, normal mood  . Impression:  Diet has been very helpful in controlling sugars. Last eye exam a few months ago - sees annually - given a good report.   Will try Libre2 trial again     Aug 14, 2023      in person  PCP: Dr. Joe Teixeira              Card: Patricia Calle (Hx angina, elevated lipids)              GI: Dr. Damir Lopez (Hx anemia) -              Ophthalmology: Santiago Valles p  f [phone]             Pod: Dr. SCHAFFER Proner - available             Ortho - Dr. Dee at  -  [facility]           Pain:  Dr. Mariah Murguia  - hip, knee pain.              .            CC: Diabetes since 2007 (, A1c 11.9 %) ***    3/2019  5.9;  10/2019  5.9  7/20  5.6 %             (anemia)              (L knee replaced - Dr. Lopez at 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   Recent A1c in August 6.7   Diet controlled diabetes. August A1c 6.7.  : : Constitutional:  Alert, well nourished, healthy appearance, no acute distress  Eyes:  No proptosis, no stare Thyroid: Pulmonary:  No respiratory distress, no accessory muscle use; normal rate and effort Cardiac:  Normal rate Vascular:  Endocrine:  No stigmata of Cushings Syndrome Musculoskeletal:  Normal gait, no involuntary movements Neurology:  No tremors Affect/Mood/Psych:  Oriented x 3; normal affect, normal insight/judgement, normal mood  .  Imp/Plan:  FBS around 120   Diabetes currently controlled by diet.  He briefly tried out a Ruthy. Not interested in insulin     Feb 06, 2023     in person  PCP: Dr. Joe Teixeira              Card: Patricia Calle (Hx angina, elevated lipids)              GI: Dr. Damir Lopez (Hx anemia) -              Ophthalmology: Santiago Valles p  f [phone]             Pod: Dr. KARIME Callahan - available             Ortho - Dr. Dee at [facility]            .            CC: Diabetes since 2007 (, A1c 11.9 %) ***    3/2019  5.9;  10/2019  5.9  7/20  5.6 %             (anemia)              (L knee replaced - Dr. Lopez at 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   Recent A1c in August 6.7   Diet controlled diabetes. August A1c 6.7.  His fingerstick BS checked regularly, but not available today.  Acquiesces to a trial of Ruthy 2  : : Constitutional:  Alert, well nourished, healthy appearance, no acute distress  Eyes:  No proptosis, no stare Thyroid: Pulmonary:  No respiratory distress, no accessory muscle use; normal rate and effort Cardiac:  Normal rate Vascular:  Endocrine:  No stigmata of Cushing's Syndrome Musculoskeletal:  Normal gait, no involuntary movements Neurology:  No tremors Affect/Mood/Psych:  Oriented x 3; normal affect, normal insight/judgement, normal mood  .       Sep 21, 2022     in person  PCP: Dr. Joe Teixeira              Card: Patricia Calle (Hx angina, elevated lipids)              GI: Dr. Damir Lopez (Hx anemia) -              Ophthalmology: Santiago Valles p  f [phone]             Pod: Dr. KARIME Callahan - available             Ortho - Dr. Dee at [facility]            .            CC: Diabetes since 2007 (, A1c 11.9 %) ***    3/2019  5.9;  10/2019  5.9  7/20  5.6 %             (anemia)              (L knee replaced - Dr. Lopez at 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 August A1c 6.3  : : Constitutional:  Alert, well nourished, healthy appearance, no acute distress  Eyes:  No proptosis, no stare Thyroid:  normal to palpation Pulmonary:  No respiratory distress, no accessory muscle use; normal rate and effort Cardiac:  Normal rate Vascular:  Endocrine:  No stigmata of Cushing's Syndrome Musculoskeletal:  Normal gait, no involuntary movements Neurology:  No tremors Affect/Mood/Psych:  Oriented x 3; normal affect, normal insight/judgement, normal mood  .   Impression:  He would find CGM educational. Needs password to install kae and he is not enthusiatic about a trial.     Karsten 10, 2022    in person  PCP: Dr. Joe Teixeira              Card: Patricia Calle (Hx angina, elevated lipids)              GI: Dr. Damir Lopez (Hx anemia) -              Ophthalmology: Santiago Valles p [phone] f [phone]             Pod: Dr. KARIME Callahan - available             Ortho - Dr. Dee at [facility]            .            CC: Diabetes since 2007 (, A1c 11.9 %) ***    3/2019  5.9;  10/2019  5.9  7/20  5.6 %             (anemia)              (L knee replaced - Dr. Lopez at 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 August A1c 6.3 Back bothers him and he found PT helpful. Symbicort helps with breathing.  Impression:  Doing excellent job of controlling diabetes  Plan:  A1c today ROv in six months.     : : Constitutional:  Alert, well nourished, healthy appearance, no acute distress  Eyes:  No proptosis, no stare Thyroid: Pulmonary:  No respiratory distress, no accessory muscle use; normal rate and effort Cardiac:  Normal rate Vascular:  Endocrine:  No stigmata of Cushing's Syndrome Musculoskeletal:  Normal gait, no involuntary movements Neurology:  No tremors Affect/Mood/Psych:  Oriented x 3; normal affect, normal insight/judgement, normal mood  .  Impression:   Controlling sugars. Endocrine contribution to weight loss not detected thus far..  Plan:  A1c today and ROV in 5-6 months.    Jul 26, 2021     in person  PCP: Dr. Joe Teixeira              Card: Patricia Calle (Hx angina, elevated lipids              GI: Dr. Damir Lopez (Hx anemia) -              Ophthalmology: Santiago Valles p [phone] f [phone]             Pod: Dr. KARIME Callahan - available             Ortho - Dr. Dee at [facility]            .            CC: Diabetes since 2007 (, A1c 11.9 %) ***    3/2019  5.9;  10/2019  5.9  7/20  5.6 %             (anemia)              (L knee replaced - Dr. Lopez at 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   When he ran out of Januvia, that was discontinued, so  currently the diabetes is being  controlled by diet.  Had recent Cologuard test. Feels well. Brings in fingerstick BS, mostly fasting. Fasting sugars tend to run in range of 104 mg/dl   : :                5 ft 10, 145 lb    (keeping to seafood and veggies)             Constitutional:  Alert, well nourished, healthy appearance, no acute distress  Eyes:  No proptosis, no stare Thyroid: Pulmonary:  No respiratory distress, no accessory muscle use; normal rate and effort Cardiac:  Normal rate Vascular:  Endocrine:  No stigmata of Cushing's Syndrome Musculoskeletal:  Normal gait, no involuntary movements Neurology:  No tremors Affect/Mood/Psych:  Oriented x 3; normal affect, normal insight/judgement, normal mood  .  Impression:  Diabetes appears to be well controlled.  Plan:  Records reviewed. A1c today. ROV six months.   Annual eye exam    Jan 25, 2021    in person  PCP: Dr. Joe Teixeira              Card: Patricia Calle (Hx angina, elevated lipids              GI: Dr. Damir Lopez (Hx anemia) -              Ophthalmology: Santiago Valles p  f [phone]             Pod: Dr. KARIME Callahan - available             Ortho - Dr. Dee at  -  [facility]            .            CC: Diabetes since 2007 (, A1c 11.9 %) ***    3/2019  5.9;  10/2019  5.9  7/20  5.6 %             (anemia)              (L knee replaced - Dr. Lopez at 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   When he ran out of Januvia, that was discontinued, so  currently the diabetes is being  controlled by diet.  : : Constitutional:  Alert, well nourished, healthy appearance, no acute distress  Eyes:  No proptosis, no stare Thyroid: Pulmonary:  No respiratory distress, no accessory muscle use; normal rate and effort Cardiac:  Normal rate Vascular:  Endocrine:  No stigmata of Cushing's Syndrome Musculoskeletal:  Normal gait, no involuntary movements Neurology:  No tremors Affect/Mood/Psych:  Oriented x 3; normal affect, normal insight/judgement, normal mood  .  Impression  Diabetes well controlled by attention to diet.   Plan:  Updated labs today.

## 2024-09-11 LAB
ESTIMATED AVERAGE GLUCOSE: 140 MG/DL
FRUCTOSAMINE SERPL-MCNC: 266 UMOL/L
HBA1C MFR BLD HPLC: 6.5 %

## 2024-09-13 ENCOUNTER — APPOINTMENT (OUTPATIENT)
Dept: CARDIOLOGY | Facility: CLINIC | Age: 79
End: 2024-09-13
Payer: MEDICARE

## 2024-09-13 PROCEDURE — 93306 TTE W/DOPPLER COMPLETE: CPT

## 2024-09-13 PROCEDURE — 36415 COLL VENOUS BLD VENIPUNCTURE: CPT

## 2024-09-16 LAB
APTT BLD: 31.2 SEC
CHOLEST SERPL-MCNC: 108 MG/DL
HCT VFR BLD CALC: 45.7 %
HDLC SERPL-MCNC: 42 MG/DL
HGB BLD-MCNC: 13.7 G/DL
INR PPP: 1.09 RATIO
LDLC SERPL CALC-MCNC: 55 MG/DL
MCHC RBC-ENTMCNC: 25.6 PG
MCHC RBC-ENTMCNC: 30 GM/DL
MCV RBC AUTO: 85.3 FL
NONHDLC SERPL-MCNC: 67 MG/DL
PLATELET # BLD AUTO: 171 K/UL
PT BLD: 12.4 SEC
RBC # BLD: 5.36 M/UL
RBC # FLD: 13.8 %
TRIGL SERPL-MCNC: 49 MG/DL
WBC # FLD AUTO: 4.89 K/UL

## 2024-09-19 VITALS
HEART RATE: 50 BPM | OXYGEN SATURATION: 100 % | SYSTOLIC BLOOD PRESSURE: 155 MMHG | RESPIRATION RATE: 14 BRPM | HEIGHT: 70 IN | TEMPERATURE: 97 F | DIASTOLIC BLOOD PRESSURE: 72 MMHG | WEIGHT: 139.99 LBS

## 2024-09-19 RX ORDER — SODIUM CHLORIDE 9 MG/ML
1000 INJECTION INTRAMUSCULAR; INTRAVENOUS; SUBCUTANEOUS
Refills: 0 | Status: DISCONTINUED | OUTPATIENT
Start: 2024-09-20 | End: 2024-09-21

## 2024-09-19 RX ORDER — CHLORHEXIDINE GLUCONATE 40 MG/ML
1 SOLUTION TOPICAL ONCE
Refills: 0 | Status: DISCONTINUED | OUTPATIENT
Start: 2024-09-20 | End: 2024-09-21

## 2024-09-19 NOTE — H&P ADULT - NSHPLABSRESULTS_GEN_ALL_CORE
14.6   5.16  )-----------( 172      ( 20 Sep 2024 08:55 )             46.7   09-20    142  |  102  |  15  ----------------------------<  105[H]  3.7   |  29  |  1.02    Ca    9.6      20 Sep 2024 08:55  Mg     1.9     09-20    TPro  7.6  /  Alb  4.5  /  TBili  0.6  /  DBili  x   /  AST  23  /  ALT  24  /  AlkPhos  58  09-20    PT/INR - ( 20 Sep 2024 08:55 )   PT: 12.2 sec;   INR: 1.07          PTT - ( 20 Sep 2024 08:55 )  PTT:34.7 sec    ECg - NSR, 43 BPM

## 2024-09-19 NOTE — H&P ADULT - HEIGHT IN INCHES
Quality 431: Preventive Care And Screening: Unhealthy Alcohol Use - Screening: Patient not identified as an unhealthy alcohol user when screened for unhealthy alcohol use using a systematic screening method Quality 110: Preventive Care And Screening: Influenza Immunization: Influenza Immunization Administered during Influenza season Quality 226: Preventive Care And Screening: Tobacco Use: Screening And Cessation Intervention: Patient screened for tobacco use and is an ex/non-smoker Quality 111:Pneumonia Vaccination Status For Older Adults: Pneumococcal vaccine (PPSV23) administered on or after patient’s 60th birthday and before the end of the measurement period Detail Level: Detailed 10

## 2024-09-19 NOTE — H&P ADULT - NSICDXPASTMEDICALHX_GEN_ALL_CORE_FT
PAST MEDICAL HISTORY:  Asthma     BPH (benign prostatic hyperplasia)     CAD (coronary artery disease)     DM2 (diabetes mellitus, type 2)     HLD (hyperlipidemia)     HTN (hypertension)     ADRIÁN on CPAP

## 2024-09-19 NOTE — H&P ADULT - HISTORY OF PRESENT ILLNESS
Cardiologist - Dr. Calle  Pharmacy -  [facility] -    79M, former smoker, w/ PMHx of HTN, HLD, DM-II, 3vCAD (s/p dx LHC 9/18/24, report below), ADRIÁN on CPAP, asthma, BPH and OA, initially presented to outpt cardiologist for routine f/u after recent dx LHC. Pt endorses __ and reports DAPT compliance. He denies any __ CP, palpitations, diaphoresis, fatigue, HOGAN, orthopnea, PND, LE edema, lightheadedness, dizziness, syncope, N/V/D, abd pain, melena, BRBPR, cough, congestion, fever, chills or recent sick contact.     Cardiac Cath 9/18/24 @ [facility]: dLM 20%, pLAD 80%, mLAD 90%, mLCx 50%, pRCA %, RPL diffuse severe dz, LVEF 55%, ED 5, access R radial.    In light of pts risk factors, persistent CCS class __ anginal symptoms and known 3vCAD, pt now returns to [facility] for recommended staged PCI w/ atherectomy of RCA and LAD. Cardiologist - Dr. Calle  Pharmacy -  [facility] -    79M, former smoker, w/ PMHx of HTN, HLD, DM-II, 3vCAD (s/p dx LHC 9/18/24, report below), ADRIÁN on CPAP, asthma, BPH and OA, initially presented to outpt cardiologist for routine f/u after recent dx LHC. Pt endorses __ and reports DAPT compliance. He denies any __ CP, palpitations, diaphoresis, fatigue, HOGAN, orthopnea, PND, LE edema, lightheadedness, dizziness, syncope, N/V/D, abd pain, melena, BRBPR, cough, congestion, fever, chills or recent sick contact.     Cardiac Cath 9/18/24 @ [facility]: dLM 20%, pLAD 80%, mLAD 90%, mLCx 50%, pRCA %, RPL diffuse severe dz, LVEF 55%, ED 5, access R radial.  TTE 9/13/24: LVEF 62%, mild LVH, trace TR, trace AI.    In light of pts risk factors, persistent CCS class __ anginal symptoms and known 3vCAD, pt now returns to [facility] for recommended staged PCI w/ atherectomy of RCA and LAD. Cardiologist - Dr. Calle  Pharmacy - [facility]  [facility] - Partner    79M, former smoker, w/ PMHx of HTN, HLD, DM-II, 3vCAD (s/p dx LHC 9/18/24, report below), ADRIÁN on CPAP, asthma, BPH and OA, initially presented to outpt cardiologist for routine f/u after recent dx LHC. Pt endorses compliance with his Aspirin 81 mg medication for multiple years. He states that he has not taken his Plavix 75 mg medication since picking it up from the pharmacy on (9/18/24). He denies any CP, palpitations, diaphoresis, fatigue, HOGAN, orthopnea, PND, LE edema, lightheadedness, dizziness, syncope, N/V/D, abd pain, melena, BRBPR, cough, congestion, fever, chills or recent sick contact.     Cardiac Cath 9/18/24 @ [facility]: dLM 20%, pLAD 80%, mLAD 90%, mLCx 50%, pRCA %, RPL diffuse severe dz, LVEF 55%, ED 5, access R radial.  TTE 9/13/24: LVEF 62%, mild LVH, trace TR, trace AI.    In light of pts risk factors, known 3vCAD, pt now returns to [facility] for recommended staged PCI w/ atherectomy of RCA and LAD.

## 2024-09-19 NOTE — H&P ADULT - ASSESSMENT
79M, former smoker, w/ PMHx of HTN, HLD, DM-II, 3vCAD (s/p dx C 9/18/24, report below), ADRIÁN on CPAP, asthma, BPH and OA, initially presented to outpt cardiologist for routine f/u after recent dx LHC.   In light of pts risk factors, known 3vCAD, pt now returns to [facility] for recommended staged PCI w/ atherectomy of RCA and LAD.      ASA III          Mallampati III  Patient is a candidate for sedation: yes  Sedation: Moderate    Risks & benefits of procedure and alternative therapy have been explained to the patient including but not limited to: allergic reaction, bleeding w/possible need for blood transfusion, infection, renal and vascular compromise, limb damage, arrhythmia, stroke, vessel dissection/perforation, Myocardial infarction, emergent CABG. Informed consent obtained and in chart.       Patient denies bleeding, BRBPR, melena, hematuria, hematemesis.        cc Bolus IV x 1 followed by NS 75 cc/hr x 2 hrs per Hydration Protocol. Pt euvolemic. No evidence of JVD, pulmonary congestion, LE edema, or Ascites. Renal function WNL.   - Pt states that he has not taken plavix since picking it up on (9/18/24). Pt endorses medication compliance with ASA 81 for multiple years, last dose (9/19/24). Pt loaded with Plavix 600 mg x1 and given ASA 81 mg x1 prior to procedure. Pt's H&H, coag studies, platelets, and LFT's WNL.     - Potasium pre cath 3.7 - repleted with KCL 30 meq  - Magnesium 1.9 - repleted with magOx 400 mg.

## 2024-09-20 ENCOUNTER — INPATIENT (INPATIENT)
Facility: HOSPITAL | Age: 79
LOS: 0 days | Discharge: HOME | End: 2024-09-21
Attending: INTERNAL MEDICINE | Admitting: INTERNAL MEDICINE
Payer: COMMERCIAL

## 2024-09-20 ENCOUNTER — TRANSCRIPTION ENCOUNTER (OUTPATIENT)
Age: 79
End: 2024-09-20

## 2024-09-20 DIAGNOSIS — Z96.652 PRESENCE OF LEFT ARTIFICIAL KNEE JOINT: Chronic | ICD-10-CM

## 2024-09-20 DIAGNOSIS — Z98.890 OTHER SPECIFIED POSTPROCEDURAL STATES: Chronic | ICD-10-CM

## 2024-09-20 LAB
A1C WITH ESTIMATED AVERAGE GLUCOSE RESULT: 6.5 % — HIGH (ref 4–5.6)
ALBUMIN SERPL ELPH-MCNC: 4.5 G/DL — SIGNIFICANT CHANGE UP (ref 3.3–5)
ALP SERPL-CCNC: 58 U/L — SIGNIFICANT CHANGE UP (ref 40–120)
ALT FLD-CCNC: 24 U/L — SIGNIFICANT CHANGE UP (ref 10–45)
ANION GAP SERPL CALC-SCNC: 11 MMOL/L — SIGNIFICANT CHANGE UP (ref 5–17)
APTT BLD: 34.7 SEC — SIGNIFICANT CHANGE UP (ref 24.5–35.6)
AST SERPL-CCNC: 23 U/L — SIGNIFICANT CHANGE UP (ref 10–40)
BASOPHILS # BLD AUTO: 0.03 K/UL — SIGNIFICANT CHANGE UP (ref 0–0.2)
BASOPHILS NFR BLD AUTO: 0.6 % — SIGNIFICANT CHANGE UP (ref 0–2)
BILIRUB SERPL-MCNC: 0.6 MG/DL — SIGNIFICANT CHANGE UP (ref 0.2–1.2)
BUN SERPL-MCNC: 15 MG/DL — SIGNIFICANT CHANGE UP (ref 7–23)
CALCIUM SERPL-MCNC: 9.6 MG/DL — SIGNIFICANT CHANGE UP (ref 8.4–10.5)
CHLORIDE SERPL-SCNC: 102 MMOL/L — SIGNIFICANT CHANGE UP (ref 96–108)
CHOLEST SERPL-MCNC: 112 MG/DL — SIGNIFICANT CHANGE UP
CK MB CFR SERPL CALC: 5.1 NG/ML — SIGNIFICANT CHANGE UP (ref 0–6.7)
CK SERPL-CCNC: 191 U/L — SIGNIFICANT CHANGE UP (ref 30–200)
CO2 SERPL-SCNC: 29 MMOL/L — SIGNIFICANT CHANGE UP (ref 22–31)
CREAT SERPL-MCNC: 1.02 MG/DL — SIGNIFICANT CHANGE UP (ref 0.5–1.3)
EGFR: 74 ML/MIN/1.73M2 — SIGNIFICANT CHANGE UP
EOSINOPHIL # BLD AUTO: 0.05 K/UL — SIGNIFICANT CHANGE UP (ref 0–0.5)
EOSINOPHIL NFR BLD AUTO: 1 % — SIGNIFICANT CHANGE UP (ref 0–6)
ESTIMATED AVERAGE GLUCOSE: 140 MG/DL — HIGH (ref 68–114)
GLUCOSE BLDC GLUCOMTR-MCNC: 89 MG/DL — SIGNIFICANT CHANGE UP (ref 70–99)
GLUCOSE SERPL-MCNC: 105 MG/DL — HIGH (ref 70–99)
HCT VFR BLD CALC: 46.7 % — SIGNIFICANT CHANGE UP (ref 39–50)
HDLC SERPL-MCNC: 50 MG/DL — SIGNIFICANT CHANGE UP
HGB BLD-MCNC: 14.6 G/DL — SIGNIFICANT CHANGE UP (ref 13–17)
IMM GRANULOCYTES NFR BLD AUTO: 0.4 % — SIGNIFICANT CHANGE UP (ref 0–0.9)
INR BLD: 1.07 — SIGNIFICANT CHANGE UP (ref 0.85–1.18)
ISTAT ACTK (ACTIVATED CLOTTING TIME KAOLIN): 269 SEC — HIGH (ref 74–137)
ISTAT ACTK (ACTIVATED CLOTTING TIME KAOLIN): 287 SEC — HIGH (ref 74–137)
ISTAT ACTK (ACTIVATED CLOTTING TIME KAOLIN): 305 SEC — HIGH (ref 74–137)
ISTAT ACTK (ACTIVATED CLOTTING TIME KAOLIN): 367 SEC — HIGH (ref 74–137)
LIPID PNL WITH DIRECT LDL SERPL: 51 MG/DL — SIGNIFICANT CHANGE UP
LYMPHOCYTES # BLD AUTO: 1.09 K/UL — SIGNIFICANT CHANGE UP (ref 1–3.3)
LYMPHOCYTES # BLD AUTO: 21.1 % — SIGNIFICANT CHANGE UP (ref 13–44)
MAGNESIUM SERPL-MCNC: 1.9 MG/DL — SIGNIFICANT CHANGE UP (ref 1.6–2.6)
MCHC RBC-ENTMCNC: 26.2 PG — LOW (ref 27–34)
MCHC RBC-ENTMCNC: 31.3 GM/DL — LOW (ref 32–36)
MCV RBC AUTO: 83.7 FL — SIGNIFICANT CHANGE UP (ref 80–100)
MONOCYTES # BLD AUTO: 0.43 K/UL — SIGNIFICANT CHANGE UP (ref 0–0.9)
MONOCYTES NFR BLD AUTO: 8.3 % — SIGNIFICANT CHANGE UP (ref 2–14)
NEUTROPHILS # BLD AUTO: 3.54 K/UL — SIGNIFICANT CHANGE UP (ref 1.8–7.4)
NEUTROPHILS NFR BLD AUTO: 68.6 % — SIGNIFICANT CHANGE UP (ref 43–77)
NON HDL CHOLESTEROL: 62 MG/DL — SIGNIFICANT CHANGE UP
NRBC # BLD: 0 /100 WBCS — SIGNIFICANT CHANGE UP (ref 0–0)
PLATELET # BLD AUTO: 172 K/UL — SIGNIFICANT CHANGE UP (ref 150–400)
POTASSIUM SERPL-MCNC: 3.7 MMOL/L — SIGNIFICANT CHANGE UP (ref 3.5–5.3)
POTASSIUM SERPL-SCNC: 3.7 MMOL/L — SIGNIFICANT CHANGE UP (ref 3.5–5.3)
PROT SERPL-MCNC: 7.6 G/DL — SIGNIFICANT CHANGE UP (ref 6–8.3)
PROTHROM AB SERPL-ACNC: 12.2 SEC — SIGNIFICANT CHANGE UP (ref 9.5–13)
RBC # BLD: 5.58 M/UL — SIGNIFICANT CHANGE UP (ref 4.2–5.8)
RBC # FLD: 13.2 % — SIGNIFICANT CHANGE UP (ref 10.3–14.5)
SODIUM SERPL-SCNC: 142 MMOL/L — SIGNIFICANT CHANGE UP (ref 135–145)
TRIGL SERPL-MCNC: 45 MG/DL — SIGNIFICANT CHANGE UP
WBC # BLD: 5.16 K/UL — SIGNIFICANT CHANGE UP (ref 3.8–10.5)
WBC # FLD AUTO: 5.16 K/UL — SIGNIFICANT CHANGE UP (ref 3.8–10.5)

## 2024-09-20 PROCEDURE — 92972 PERQ TRLUML CORONRY LITHOTRP: CPT

## 2024-09-20 PROCEDURE — 99152 MOD SED SAME PHYS/QHP 5/>YRS: CPT

## 2024-09-20 PROCEDURE — 92920 PRQ TRLUML C ANGIOP 1ART&/BR: CPT | Mod: LD

## 2024-09-20 PROCEDURE — 92978 ENDOLUMINL IVUS OCT C 1ST: CPT | Mod: 26,RC

## 2024-09-20 PROCEDURE — 92933 PRQ TRLML C ATHRC ST ANGIOP1: CPT | Mod: RC

## 2024-09-20 RX ORDER — ASPIRIN 81 MG
81 TABLET, DELAYED RELEASE (ENTERIC COATED) ORAL DAILY
Refills: 0 | Status: DISCONTINUED | OUTPATIENT
Start: 2024-09-21 | End: 2024-09-21

## 2024-09-20 RX ORDER — POTASSIUM CHLORIDE 10 MEQ
30 TABLET, EXT RELEASE, PARTICLES/CRYSTALS ORAL ONCE
Refills: 0 | Status: COMPLETED | OUTPATIENT
Start: 2024-09-20 | End: 2024-09-20

## 2024-09-20 RX ORDER — SODIUM CHLORIDE 9 MG/ML
1000 INJECTION INTRAMUSCULAR; INTRAVENOUS; SUBCUTANEOUS
Refills: 0 | Status: DISCONTINUED | OUTPATIENT
Start: 2024-09-20 | End: 2024-09-20

## 2024-09-20 RX ORDER — SILDENAFIL 25 MG/1
1 TABLET, FILM COATED ORAL
Refills: 0 | DISCHARGE

## 2024-09-20 RX ORDER — LOSARTAN POTASSIUM 50 MG/1
100 TABLET ORAL DAILY
Refills: 0 | Status: DISCONTINUED | OUTPATIENT
Start: 2024-09-21 | End: 2024-09-21

## 2024-09-20 RX ORDER — MAGNESIUM OXIDE TAB 400 MG (240 MG ELEMENTAL MG) 400 (240 MG) MG
400 TAB ORAL ONCE
Refills: 0 | Status: COMPLETED | OUTPATIENT
Start: 2024-09-20 | End: 2024-09-20

## 2024-09-20 RX ORDER — FLUTICASONE PROPIONATE AND SALMETEROL 250; 50 UG/1; UG/1
1 POWDER RESPIRATORY (INHALATION)
Refills: 0 | Status: DISCONTINUED | OUTPATIENT
Start: 2024-09-20 | End: 2024-09-21

## 2024-09-20 RX ORDER — AMLODIPINE BESYLATE 10 MG/1
5 TABLET ORAL DAILY
Refills: 0 | Status: DISCONTINUED | OUTPATIENT
Start: 2024-09-20 | End: 2024-09-21

## 2024-09-20 RX ORDER — AMLODIPINE BESYLATE 10 MG/1
1 TABLET ORAL
Refills: 0 | DISCHARGE

## 2024-09-20 RX ORDER — COLCHICINE 0.6 MG
1 TABLET ORAL
Refills: 0 | DISCHARGE

## 2024-09-20 RX ORDER — SODIUM CHLORIDE 9 MG/ML
250 INJECTION INTRAMUSCULAR; INTRAVENOUS; SUBCUTANEOUS ONCE
Refills: 0 | Status: COMPLETED | OUTPATIENT
Start: 2024-09-20 | End: 2024-09-20

## 2024-09-20 RX ORDER — NEBIVOLOL 20 MG/1
1 TABLET ORAL
Refills: 0 | DISCHARGE

## 2024-09-20 RX ORDER — COLCHICINE 0.6 MG
0.6 TABLET ORAL DAILY
Refills: 0 | Status: DISCONTINUED | OUTPATIENT
Start: 2024-09-20 | End: 2024-09-21

## 2024-09-20 RX ORDER — ASPIRIN 81 MG
81 TABLET, DELAYED RELEASE (ENTERIC COATED) ORAL ONCE
Refills: 0 | Status: COMPLETED | OUTPATIENT
Start: 2024-09-20 | End: 2024-09-20

## 2024-09-20 RX ORDER — ROSUVASTATIN CALCIUM 10 MG/1
1 TABLET ORAL
Refills: 0 | DISCHARGE

## 2024-09-20 RX ORDER — ROSUVASTATIN CALCIUM 10 MG/1
10 TABLET ORAL AT BEDTIME
Refills: 0 | Status: DISCONTINUED | OUTPATIENT
Start: 2024-09-20 | End: 2024-09-21

## 2024-09-20 RX ORDER — BUDESONIDE AND FORMOTEROL FUMARATE 80; 4.5 UG/1; UG/1
2 AEROSOL, METERED RESPIRATORY (INHALATION)
Refills: 0 | DISCHARGE

## 2024-09-20 RX ORDER — LOSARTAN POTASSIUM AND HYDROCHLOROTHIAZIDE 100; 25 MG/1; MG/1
1 TABLET, FILM COATED ORAL
Refills: 0 | DISCHARGE

## 2024-09-20 RX ADMIN — MAGNESIUM OXIDE TAB 400 MG (240 MG ELEMENTAL MG) 400 MILLIGRAM(S): 400 (240 MG) TAB at 10:12

## 2024-09-20 RX ADMIN — Medication 81 MILLIGRAM(S): at 10:13

## 2024-09-20 RX ADMIN — SODIUM CHLORIDE 75 MILLILITER(S): 9 INJECTION INTRAMUSCULAR; INTRAVENOUS; SUBCUTANEOUS at 10:11

## 2024-09-20 RX ADMIN — Medication 600 MILLIGRAM(S): at 10:12

## 2024-09-20 RX ADMIN — Medication 30 MILLIEQUIVALENT(S): at 10:12

## 2024-09-20 RX ADMIN — SODIUM CHLORIDE 200 MILLILITER(S): 9 INJECTION INTRAMUSCULAR; INTRAVENOUS; SUBCUTANEOUS at 14:18

## 2024-09-20 RX ADMIN — SODIUM CHLORIDE 1000 MILLILITER(S): 9 INJECTION INTRAMUSCULAR; INTRAVENOUS; SUBCUTANEOUS at 10:11

## 2024-09-20 RX ADMIN — FLUTICASONE PROPIONATE AND SALMETEROL 1 DOSE(S): 250; 50 POWDER RESPIRATORY (INHALATION) at 18:37

## 2024-09-20 NOTE — DISCHARGE NOTE PROVIDER - NSDCCPCAREPLAN_GEN_ALL_CORE_FT
PRINCIPAL DISCHARGE DIAGNOSIS  Diagnosis: CAD (coronary artery disease)  Assessment and Plan of Treatment: You were found to have blockages in the arteries of your heart, also known as Coronary Artery Disease. You underwent a cardiac catheterization on 9/20/24 and received a stent to the Right Coronary artery.  PLEASE CONTINUE ASPIRIN 81MG DAILY AND PLAVIX 75MG DAILY. DO NOT STOP THESE MEDICATIONS FOR ANY REASON AS THEY ARE KEEPING YOUR STENT OPEN AND PREVENTING A HEART ATTACK.   Avoid strenuous activity or heavy lifting anything more than 5lbs for the next five days. Do not take a bath or swim for the next five days; you may shower. For any bleeding or hematoma formation (hardened blood collection under the skin) at the access site of your RIGHT GROIN please hold pressure and go to the emergency room.   Please follow up with Dr. Wong in 1-2 weeks. For recurrent chest pain, please call your doctor or go to the emergency room.  Please return in 4 weeks for additional intervention of residual disease.        SECONDARY DISCHARGE DIAGNOSES  Diagnosis: HLD (hyperlipidemia)  Assessment and Plan of Treatment: Please continue your daily statin to ensure your cardiac stent remains open.    Diagnosis: Diabetes mellitus  Assessment and Plan of Treatment: You have a diagnosis of diabetes and should continue all of your diabetic medications as prescribed. Please do not take your metformin for 2 days to prevent interaction with the contrast you recieved.

## 2024-09-20 NOTE — DISCHARGE NOTE PROVIDER - CARE PROVIDER_API CALL
Ruthann Wong  Interventional Cardiology  [address] [place] [id number]  Phone: [phone]  Fax: [phone]  Established Patient  Follow Up Time:

## 2024-09-20 NOTE — DISCHARGE NOTE PROVIDER - NSDCMRMEDTOKEN_GEN_ALL_CORE_FT
Albuterol (Eqv-ProAir HFA) 90 mcg/inh inhalation aerosol: 2 puff(s) inhaled every 4 to 6 hours  amLODIPine 5 mg oral tablet: 1 tab(s) orally once a day  Aspirin EC 81 mg oral delayed release tablet: 1 tab(s) orally once a day  clopidogrel 75 mg oral tablet: 1 tab(s) orally once a day  colchicine 0.6 mg oral tablet: 1 tab(s) orally once a day  losartan-hydroCHLOROthiazide 100 mg-25 mg oral tablet: 1 tab(s) orally once a day  nebivolol 5 mg oral tablet: 1 tab(s) orally once a day  rosuvastatin 10 mg oral tablet: 1 tab(s) orally once a day (at bedtime)  sildenafil 50 mg oral tablet: 1 tab(s) orally  Symbicort 160 mcg-4.5 mcg/inh inhalation aerosol: 2 puff(s) inhaled 2 times a day

## 2024-09-20 NOTE — PATIENT PROFILE ADULT - FALL HARM RISK - HARM RISK INTERVENTIONS

## 2024-09-20 NOTE — DISCHARGE NOTE PROVIDER - HOSPITAL COURSE
79M, former smoker, w/ PMHx of HTN, HLD, DM-II, 3vCAD (s/p dx LHC 9/18/24, report below), ADRIÁN on CPAP, asthma, BPH and OA, initially presented to outpt cardiologist for routine f/u after recent dx LHC. Pt endorses compliance with his Aspirin 81 mg medication for multiple years. He states that he has not taken his Plavix 75 mg medication since picking it up from the pharmacy on (9/18/24). He denies any CP, palpitations, diaphoresis, fatigue, HOGAN, orthopnea, PND, LE edema, lightheadedness, dizziness, syncope, N/V/D, abd pain, melena, BRBPR, cough, congestion, fever, chills or recent sick contact.     Cardiac Cath 9/18/24 @ [facility]: dLM 20%, pLAD 80%, mLAD 90%, mLCx 50%, pRCA %, RPL diffuse severe dz, LVEF 55%, ED 5, access R radial.  TTE 9/13/24: LVEF 62%, mild LVH, trace TR, trace AI.    Pt now s/p cardiac cath (9/20/24): staged PCI RCA:  m/pRCA 99% --> s/p total 3 FAITH stents - return for residual  LAD in 4 weeks. right femoral access perclose.     Pt admitted overnight for observation and telemetry monitoring. Pt seen and examined at bedside this AM without any complaints or events overnight, VSS, labs and telemetry reviewed and pt stable for discharge as discussed with Dr. Beaver. Pt has received appropriate discharge instructions, including medication regimen, access site management and follow up with Dr. Wong in 1-2 weeks.    Cardiac Rehab (Post PCI): Education on benefits of Cardiac Rehab provided to patient: No - incomplete revascularization - pt to return for staged intervention of residual disease.    Pt discharge copies detail cardiovascular history, medication testing/treatments OR has created a patient portal account and instructed to provider their records at their 1st appointment.    CVD (GLP-1 receptor agonist, SGLT2 inhibitor) meds discussed w/ patients and encouraged to discuss further with PMD or endo at next visit.   79M, former smoker, w/ PMHx of HTN, HLD, DM-II, 3vCAD (s/p dx LHC 9/18/24, report below), ADRIÁN on CPAP, asthma, BPH and OA, initially presented to outpt cardiologist for routine f/u after recent dx LHC. Pt endorses compliance with his Aspirin 81 mg medication for multiple years. He states that he has not taken his Plavix 75 mg medication since picking it up from the pharmacy on (9/18/24). He denies any CP, palpitations, diaphoresis, fatigue, HOGAN, orthopnea, PND, LE edema, lightheadedness, dizziness, syncope, N/V/D, abd pain, melena, BRBPR, cough, congestion, fever, chills or recent sick contact.     Cardiac Cath 9/18/24 @ [facility]: dLM 20%, pLAD 80%, mLAD 90%, mLCx 50%, pRCA %, RPL diffuse severe dz, LVEF 55%, ED 5, access R radial.  TTE 9/13/24: LVEF 62%, mild LVH, trace TR, trace AI.    Pt now s/p cardiac cath (9/20/24): staged PCI RCA:  m/pRCA 99% --> s/p total 3 FAITH stents - return for residual  LAD in 4 weeks. right femoral access perclose.     Pt admitted overnight for observation and telemetry monitoring. Pt seen and examined at bedside this AM without any complaints or events overnight, VSS, labs and telemetry reviewed and pt stable for discharge as discussed with Dr. Beaver. Pt has received appropriate discharge instructions, including medication regimen, access site management and follow up with Dr. Wong in 1-2 weeks.    Cardiac Rehab (Post PCI): Education on benefits of Cardiac Rehab provided to patient: No - incomplete revascularization - pt to return for staged intervention of residual disease.    Pt discharge copies detail cardiovascular history, medication testing/treatments OR has created a patient portal account and instructed to provider their records at their 1st appointment.    CVD (GLP-1 receptor agonist, SGLT2 inhibitor) meds discussed w/ patients and encouraged to discuss further with PMD or endo at next visit.

## 2024-09-21 ENCOUNTER — TRANSCRIPTION ENCOUNTER (OUTPATIENT)
Age: 79
End: 2024-09-21

## 2024-09-21 VITALS
OXYGEN SATURATION: 97 % | DIASTOLIC BLOOD PRESSURE: 73 MMHG | SYSTOLIC BLOOD PRESSURE: 134 MMHG | RESPIRATION RATE: 18 BRPM | HEART RATE: 70 BPM

## 2024-09-21 LAB
ANION GAP SERPL CALC-SCNC: 7 MMOL/L — SIGNIFICANT CHANGE UP (ref 5–17)
BUN SERPL-MCNC: 15 MG/DL — SIGNIFICANT CHANGE UP (ref 7–23)
CALCIUM SERPL-MCNC: 9.1 MG/DL — SIGNIFICANT CHANGE UP (ref 8.4–10.5)
CHLORIDE SERPL-SCNC: 105 MMOL/L — SIGNIFICANT CHANGE UP (ref 96–108)
CO2 SERPL-SCNC: 28 MMOL/L — SIGNIFICANT CHANGE UP (ref 22–31)
CREAT SERPL-MCNC: 0.98 MG/DL — SIGNIFICANT CHANGE UP (ref 0.5–1.3)
EGFR: 77 ML/MIN/1.73M2 — SIGNIFICANT CHANGE UP
GLUCOSE BLDC GLUCOMTR-MCNC: 71 MG/DL — SIGNIFICANT CHANGE UP (ref 70–99)
GLUCOSE SERPL-MCNC: 83 MG/DL — SIGNIFICANT CHANGE UP (ref 70–99)
HCT VFR BLD CALC: 41.2 % — SIGNIFICANT CHANGE UP (ref 39–50)
HGB BLD-MCNC: 12.7 G/DL — LOW (ref 13–17)
MAGNESIUM SERPL-MCNC: 1.9 MG/DL — SIGNIFICANT CHANGE UP (ref 1.6–2.6)
MCHC RBC-ENTMCNC: 25.4 PG — LOW (ref 27–34)
MCHC RBC-ENTMCNC: 30.8 GM/DL — LOW (ref 32–36)
MCV RBC AUTO: 82.4 FL — SIGNIFICANT CHANGE UP (ref 80–100)
NRBC # BLD: 0 /100 WBCS — SIGNIFICANT CHANGE UP (ref 0–0)
PLATELET # BLD AUTO: 158 K/UL — SIGNIFICANT CHANGE UP (ref 150–400)
POTASSIUM SERPL-MCNC: 3.8 MMOL/L — SIGNIFICANT CHANGE UP (ref 3.5–5.3)
POTASSIUM SERPL-SCNC: 3.8 MMOL/L — SIGNIFICANT CHANGE UP (ref 3.5–5.3)
RBC # BLD: 5 M/UL — SIGNIFICANT CHANGE UP (ref 4.2–5.8)
RBC # FLD: 13.1 % — SIGNIFICANT CHANGE UP (ref 10.3–14.5)
SODIUM SERPL-SCNC: 140 MMOL/L — SIGNIFICANT CHANGE UP (ref 135–145)
WBC # BLD: 6.39 K/UL — SIGNIFICANT CHANGE UP (ref 3.8–10.5)
WBC # FLD AUTO: 6.39 K/UL — SIGNIFICANT CHANGE UP (ref 3.8–10.5)

## 2024-09-21 PROCEDURE — 93005 ELECTROCARDIOGRAM TRACING: CPT

## 2024-09-21 PROCEDURE — 82962 GLUCOSE BLOOD TEST: CPT

## 2024-09-21 PROCEDURE — 80048 BASIC METABOLIC PNL TOTAL CA: CPT

## 2024-09-21 PROCEDURE — C9602: CPT

## 2024-09-21 PROCEDURE — C1769: CPT

## 2024-09-21 PROCEDURE — 80061 LIPID PANEL: CPT

## 2024-09-21 PROCEDURE — C1725: CPT

## 2024-09-21 PROCEDURE — 93458 L HRT ARTERY/VENTRICLE ANGIO: CPT

## 2024-09-21 PROCEDURE — 85025 COMPLETE CBC W/AUTO DIFF WBC: CPT

## 2024-09-21 PROCEDURE — 82553 CREATINE MB FRACTION: CPT

## 2024-09-21 PROCEDURE — C1887: CPT

## 2024-09-21 PROCEDURE — 85027 COMPLETE CBC AUTOMATED: CPT

## 2024-09-21 PROCEDURE — C1760: CPT

## 2024-09-21 PROCEDURE — C1761: CPT

## 2024-09-21 PROCEDURE — 80053 COMPREHEN METABOLIC PANEL: CPT

## 2024-09-21 PROCEDURE — 83036 HEMOGLOBIN GLYCOSYLATED A1C: CPT

## 2024-09-21 PROCEDURE — 92978 ENDOLUMINL IVUS OCT C 1ST: CPT

## 2024-09-21 PROCEDURE — C1894: CPT

## 2024-09-21 PROCEDURE — 93010 ELECTROCARDIOGRAM REPORT: CPT

## 2024-09-21 PROCEDURE — 92972 PERQ TRLUML CORONRY LITHOTRP: CPT

## 2024-09-21 PROCEDURE — 94640 AIRWAY INHALATION TREATMENT: CPT

## 2024-09-21 PROCEDURE — 92920 PRQ TRLUML C ANGIOP 1ART&/BR: CPT

## 2024-09-21 PROCEDURE — C1724: CPT

## 2024-09-21 PROCEDURE — 85347 COAGULATION TIME ACTIVATED: CPT

## 2024-09-21 PROCEDURE — 82550 ASSAY OF CK (CPK): CPT

## 2024-09-21 PROCEDURE — 85610 PROTHROMBIN TIME: CPT

## 2024-09-21 PROCEDURE — 85730 THROMBOPLASTIN TIME PARTIAL: CPT

## 2024-09-21 PROCEDURE — C1874: CPT

## 2024-09-21 PROCEDURE — 36415 COLL VENOUS BLD VENIPUNCTURE: CPT

## 2024-09-21 PROCEDURE — 83735 ASSAY OF MAGNESIUM: CPT

## 2024-09-21 PROCEDURE — 99233 SBSQ HOSP IP/OBS HIGH 50: CPT

## 2024-09-21 PROCEDURE — C1753: CPT

## 2024-09-21 RX ORDER — DEXTROSE 15 G/33 G
12.5 GEL IN PACKET (GRAM) ORAL ONCE
Refills: 0 | Status: DISCONTINUED | OUTPATIENT
Start: 2024-09-21 | End: 2024-09-21

## 2024-09-21 RX ORDER — DEXTROSE 15 G/33 G
25 GEL IN PACKET (GRAM) ORAL ONCE
Refills: 0 | Status: DISCONTINUED | OUTPATIENT
Start: 2024-09-21 | End: 2024-09-21

## 2024-09-21 RX ORDER — DEXTROSE 15 G/33 G
15 GEL IN PACKET (GRAM) ORAL ONCE
Refills: 0 | Status: DISCONTINUED | OUTPATIENT
Start: 2024-09-21 | End: 2024-09-21

## 2024-09-21 RX ORDER — ASPIRIN 81 MG
1 TABLET, DELAYED RELEASE (ENTERIC COATED) ORAL
Refills: 0 | DISCHARGE

## 2024-09-21 RX ORDER — GLUCAGON INJECTION, SOLUTION 1 MG/.2ML
1 INJECTION, SOLUTION SUBCUTANEOUS ONCE
Refills: 0 | Status: DISCONTINUED | OUTPATIENT
Start: 2024-09-21 | End: 2024-09-21

## 2024-09-21 RX ORDER — MAGNESIUM OXIDE TAB 400 MG (240 MG ELEMENTAL MG) 400 (240 MG) MG
800 TAB ORAL ONCE
Refills: 0 | Status: COMPLETED | OUTPATIENT
Start: 2024-09-21 | End: 2024-09-21

## 2024-09-21 RX ORDER — POTASSIUM CHLORIDE 10 MEQ
40 TABLET, EXT RELEASE, PARTICLES/CRYSTALS ORAL ONCE
Refills: 0 | Status: COMPLETED | OUTPATIENT
Start: 2024-09-21 | End: 2024-09-21

## 2024-09-21 RX ORDER — ASPIRIN 81 MG
1 TABLET, DELAYED RELEASE (ENTERIC COATED) ORAL
Qty: 30 | Refills: 11
Start: 2024-09-21 | End: 2025-09-15

## 2024-09-21 RX ADMIN — Medication 75 MILLIGRAM(S): at 11:34

## 2024-09-21 RX ADMIN — Medication 40 MILLIEQUIVALENT(S): at 09:29

## 2024-09-21 RX ADMIN — AMLODIPINE BESYLATE 5 MILLIGRAM(S): 10 TABLET ORAL at 06:18

## 2024-09-21 RX ADMIN — Medication 81 MILLIGRAM(S): at 11:34

## 2024-09-21 RX ADMIN — LOSARTAN POTASSIUM 100 MILLIGRAM(S): 50 TABLET ORAL at 07:33

## 2024-09-21 RX ADMIN — FLUTICASONE PROPIONATE AND SALMETEROL 1 DOSE(S): 250; 50 POWDER RESPIRATORY (INHALATION) at 07:37

## 2024-09-21 RX ADMIN — Medication 0.6 MILLIGRAM(S): at 11:35

## 2024-09-21 RX ADMIN — MAGNESIUM OXIDE TAB 400 MG (240 MG ELEMENTAL MG) 800 MILLIGRAM(S): 400 (240 MG) TAB at 09:29

## 2024-09-21 NOTE — DISCHARGE NOTE NURSING/CASE MANAGEMENT/SOCIAL WORK - PATIENT PORTAL LINK FT
You can access the FollowMyHealth Patient Portal offered by [facility] by registering at the following website: [email or web address]. By joining [facility]’s FollowMyHealth portal, you will also be able to view your health information using other applications (apps) compatible with our system.

## 2024-09-21 NOTE — DISCHARGE NOTE NURSING/CASE MANAGEMENT/SOCIAL WORK - NSDCPEFALRISK_GEN_ALL_CORE
For information on Fall & Injury Prevention, visit: [email or web address] OR  [email or web address] OR  https://www.cdc.gov/steadi/patient.html

## 2024-09-23 ENCOUNTER — NON-APPOINTMENT (OUTPATIENT)
Age: 79
End: 2024-09-23

## 2024-09-27 PROBLEM — E11.9 TYPE 2 DIABETES MELLITUS WITHOUT COMPLICATIONS: Chronic | Status: ACTIVE | Noted: 2024-09-19

## 2024-09-27 PROBLEM — G47.33 OBSTRUCTIVE SLEEP APNEA (ADULT) (PEDIATRIC): Chronic | Status: ACTIVE | Noted: 2024-09-19

## 2024-09-27 PROBLEM — N40.0 BENIGN PROSTATIC HYPERPLASIA WITHOUT LOWER URINARY TRACT SYMPTOMS: Chronic | Status: ACTIVE | Noted: 2024-09-19

## 2024-09-27 PROBLEM — E78.5 HYPERLIPIDEMIA, UNSPECIFIED: Chronic | Status: ACTIVE | Noted: 2024-09-19

## 2024-09-27 PROBLEM — I25.10 ATHEROSCLEROTIC HEART DISEASE OF NATIVE CORONARY ARTERY WITHOUT ANGINA PECTORIS: Chronic | Status: ACTIVE | Noted: 2024-09-19

## 2024-09-27 PROBLEM — I10 ESSENTIAL (PRIMARY) HYPERTENSION: Chronic | Status: ACTIVE | Noted: 2024-09-19

## 2024-09-27 PROBLEM — J45.909 UNSPECIFIED ASTHMA, UNCOMPLICATED: Chronic | Status: ACTIVE | Noted: 2024-09-19

## 2024-09-30 DIAGNOSIS — N40.0 BENIGN PROSTATIC HYPERPLASIA WITHOUT LOWER URINARY TRACT SYMPTOMS: ICD-10-CM

## 2024-09-30 DIAGNOSIS — Z96.652 PRESENCE OF LEFT ARTIFICIAL KNEE JOINT: ICD-10-CM

## 2024-09-30 DIAGNOSIS — G47.33 OBSTRUCTIVE SLEEP APNEA (ADULT) (PEDIATRIC): ICD-10-CM

## 2024-09-30 DIAGNOSIS — E78.5 HYPERLIPIDEMIA, UNSPECIFIED: ICD-10-CM

## 2024-09-30 DIAGNOSIS — I25.10 ATHEROSCLEROTIC HEART DISEASE OF NATIVE CORONARY ARTERY WITHOUT ANGINA PECTORIS: ICD-10-CM

## 2024-09-30 DIAGNOSIS — Z79.51 LONG TERM (CURRENT) USE OF INHALED STEROIDS: ICD-10-CM

## 2024-09-30 DIAGNOSIS — Z99.89 DEPENDENCE ON OTHER ENABLING MACHINES AND DEVICES: ICD-10-CM

## 2024-09-30 DIAGNOSIS — E11.9 TYPE 2 DIABETES MELLITUS WITHOUT COMPLICATIONS: ICD-10-CM

## 2024-09-30 DIAGNOSIS — Z79.82 LONG TERM (CURRENT) USE OF ASPIRIN: ICD-10-CM

## 2024-09-30 DIAGNOSIS — Z87.891 PERSONAL HISTORY OF NICOTINE DEPENDENCE: ICD-10-CM

## 2024-09-30 DIAGNOSIS — J45.909 UNSPECIFIED ASTHMA, UNCOMPLICATED: ICD-10-CM

## 2024-09-30 DIAGNOSIS — Z95.5 PRESENCE OF CORONARY ANGIOPLASTY IMPLANT AND GRAFT: ICD-10-CM

## 2024-09-30 DIAGNOSIS — I10 ESSENTIAL (PRIMARY) HYPERTENSION: ICD-10-CM

## 2024-10-01 ENCOUNTER — TRANSCRIPTION ENCOUNTER (OUTPATIENT)
Age: 79
End: 2024-10-01

## 2024-10-01 DIAGNOSIS — K76.89 OTHER SPECIFIED DISEASES OF LIVER: ICD-10-CM

## 2024-10-08 ENCOUNTER — NON-APPOINTMENT (OUTPATIENT)
Age: 79
End: 2024-10-08

## 2024-10-08 ENCOUNTER — APPOINTMENT (OUTPATIENT)
Dept: HEART AND VASCULAR | Facility: CLINIC | Age: 79
End: 2024-10-08
Payer: MEDICARE

## 2024-10-08 VITALS
SYSTOLIC BLOOD PRESSURE: 110 MMHG | HEART RATE: 57 BPM | BODY MASS INDEX: 19.76 KG/M2 | DIASTOLIC BLOOD PRESSURE: 60 MMHG | WEIGHT: 138 LBS | OXYGEN SATURATION: 97 % | HEIGHT: 70 IN

## 2024-10-08 PROCEDURE — 93000 ELECTROCARDIOGRAM COMPLETE: CPT

## 2024-10-08 PROCEDURE — 99214 OFFICE O/P EST MOD 30 MIN: CPT

## 2024-10-08 RX ORDER — LOSARTAN POTASSIUM 100 MG/1
100 TABLET, FILM COATED ORAL DAILY
Qty: 90 | Refills: 3 | Status: ACTIVE | COMMUNITY
Start: 2024-10-08 | End: 1900-01-01

## 2024-10-13 RX ORDER — CLOPIDOGREL BISULFATE 75 MG/1
75 TABLET, FILM COATED ORAL DAILY
Qty: 90 | Refills: 3 | Status: ACTIVE | COMMUNITY
Start: 1900-01-01 | End: 1900-01-01

## 2024-10-15 VITALS
OXYGEN SATURATION: 99 % | SYSTOLIC BLOOD PRESSURE: 156 MMHG | HEIGHT: 70 IN | RESPIRATION RATE: 16 BRPM | WEIGHT: 139.99 LBS | HEART RATE: 51 BPM | TEMPERATURE: 98 F | DIASTOLIC BLOOD PRESSURE: 73 MMHG

## 2024-10-15 RX ORDER — SODIUM CHLORIDE 9 MG/ML
1000 INJECTION, SOLUTION INTRAMUSCULAR; INTRAVENOUS; SUBCUTANEOUS
Refills: 0 | Status: DISCONTINUED | OUTPATIENT
Start: 2024-10-21 | End: 2024-10-22

## 2024-10-15 RX ORDER — CHLORHEXIDINE GLUCONATE 40 MG/ML
1 SOLUTION TOPICAL ONCE
Refills: 0 | Status: DISCONTINUED | OUTPATIENT
Start: 2024-10-21 | End: 2024-10-23

## 2024-10-15 NOTE — H&P ADULT - ASSESSMENT
79M, former smoker, w/ PMHx of HTN, HLD, DM-II, 3vCAD (DESx3 RCA, see below), ADRIÁN on CPAP, asthma, BPH and OA whom light of pt's risk factors and known unvascularized CAD, pt now presents to [facility] for recommended staged PCI to LAD.     EKG:   			  ASA: III	Mallampati class: II   Anginal Class: II    - No Known Allergies    - H/H = 12.8/41.2  - Pt denies hematuria, hematochezia, melena. Pt endorses compliance w/ home Aspirin 81mg, stating last dose was 10/20/24 and Plavix 75mg, stating last dose was 10/21/24. Pt loaded w/ ASA 81 mg x1.    - BUN/Cr = 17/0.97  - EF 62%. Euvolemic on exam. IV NS @ 250 bolus followed by 75 cc/hr x 2 hrs started pre procedure.    - Mg 1.9 - repleted w/ Mg 400mg x 1    Sedation Plan:   Moderate  Patient Is Suitable Candidate For Sedation?     Yes    Risks & benefits of procedure and alternative therapy have been explained to the patient by the Interventional Cardiology Fellow including but not limited to: allergic reaction, bleeding with possible need for blood transfusion, infection, renal and vascular compromise, limb damage, arrhythmia, stroke, vessel dissection/perforation, myocardial infarction, and emergent CABG. Informed consent obtained at bedside and included in chart.     79M, former smoker, w/ PMHx of HTN, HLD, DM-II, 3vCAD (DESx3 RCA, see below), ADRIÁN on CPAP, asthma, BPH and OA whom light of pt's risk factors and known unvascularized CAD, pt now presents to [facility] for recommended staged PCI to LAD.     EKG:   			  ASA: III	Mallampati class: II   Anginal Class: II    - No Known Allergies    - H/H = 12.8/41.2  - Pt denies hematuria, hematochezia, melena. Pt endorses compliance w/ home Aspirin 81mg, stating last dose was 10/20/24 and Plavix 75mg, stating last dose was 10/21/24. Pt loaded w/ ASA 81 mg x1.    - BUN/Cr = 17/0.97  - EF 62%. Euvolemic on exam. IV NS @ 250 bolus followed by 75 cc/hr x 2 hrs started pre procedure.    - Mg 1.9 - repleted w/ Mg 400mg x 1    Sedation Plan:   Moderate  Patient Is Suitable Candidate For Sedation?     Yes    Risks & benefits of procedure and alternative therapy have been explained to the patient including but not limited to: allergic reaction, bleeding with possible need for blood transfusion, infection, renal and vascular compromise, limb damage, arrhythmia, stroke, vessel dissection/perforation, myocardial infarction, and emergent CABG. Informed consent obtained at bedside and included in chart.     79M, former smoker, w/ PMHx of HTN, HLD, DM-II, 3vCAD (DESx3 RCA, see below), ADRIÁN on CPAP, asthma, BPH and OA whom light of pt's risk factors and known unvascularized CAD,  who came to [facility] for recommended staged PCI to LAD. However, now with impella d/t complication    NEURO  #intubated   #Sedated   Post procedure intubation    CARDIOVASCULAR  #Impella   s/p impella implementation 10/21  - f/up perfusion labs     #3vCAD   DESx3 RCA  Home regimen: Aspirin 81 qD and Clopigrel 75 qD   GDMT: nebivolol 5mg PO (Beta Blocker) and losartan 100 qD    #HTN  Home regimen: amlodipine 5 qD and losartan 100 qD    #HLD   Home regimen: Rosuvastatin 10 mg qD = Atorvastatin 20 qD     PULM/RESPIRATORY   #ADRIÁN   - on BiPAP at baseline    #Asthma  Home regimen: Symbicory 160 mg- 4.5 mcg x 2 puff qDay     GASTROINTESTINAL   Diet: NPO from procedure -> bedside dysphagia?    RENAL  Cr stable 0.97, SHANTEL    GENITOURINARY   #BPH    ENDOCRINE  #DM-II   A1c 6.3, estimate average glucose 134  Home regimen: none   - mISS     HEMATOLOGIC   #Anemia   HgB slightly low 12.8, baseline last month 14.6     INFECTIOUS DISEASE  SHANTEL    MSK  #Debility   Post procedure rest for x hours   Then encourage OOB    DERMATOLOGY   SHANTEL    PROPHYLACTIC   F: none indicated    E: replete as needed; Keep K>4, Mg >2   D: DASH/TLC  DVT Proph: Lovenox  Dispo: ccu   79M, former smoker, w/ PMHx of HTN, HLD, DM-II, 3vCAD (DESx3 RCA, see below), ADRIÁN on CPAP, asthma, BPH and OA whom light of pt's risk factors and known unvascularized CAD,  who came to [facility] for recommended staged PCI to LAD. However, now with impella d/t complication    NEURO  AOx3     CARDIOVASCULAR  #IABP   d/t cardiogenic shock   Depending on _ ratio   - wean as tolerated   - pressors??    #3vCAD   DESx3 RCA  Home regimen: Aspirin 81 qD and Clopigrel 75 qD   GDMT: nebivolol 5mg PO (Beta Blocker) and losartan 100 qD  - Hold iso shock     #HTN  Home regimen: amlodipine 5 qD and losartan 100 qD  - Hold iso shock     #HLD   Home regimen: Rosuvastatin 10 mg qD = Atorvastatin 20 qD   - Hold iso shock    PULM/RESPIRATORY   #ADRIÁN   - on BiPAP at baseline    #Asthma  Home regimen: Symbicory 160 mg- 4.5 mcg x 2 puff qDay     GASTROINTESTINAL   Diet: NPO from procedure -> bedside dysphagia?    RENAL  Cr stable 0.97, SHANTEL    GENITOURINARY   #BPH  - zheng     ENDOCRINE  #DM-II   A1c 6.3, estimate average glucose 134  Home regimen: none   - mISS     HEMATOLOGIC   #Anemia   HgB slightly low 12.8, baseline last month 14.6     INFECTIOUS DISEASE  SHANTEL    MSK  #Debility   Post procedure rest for x hours   Then encourage OOB    DERMATOLOGY   SHANTEL    PROPHYLACTIC   F: none indicated    E: replete as needed; Keep K>4, Mg >2   D: DASH/TLC  DVT Proph: Lovenox  Dispo: ccu   79M, former smoker, w/ PMHx of HTN, HLD, DM-II, 3vCAD (DESx3 RCA, see below), ADRIÁN on CPAP, asthma, BPH and OA whom light of pt's risk factors and known unvascularized CAD,  who came to [facility] for recommended staged PCI to LAD.  Now, with IABP placed to improve flow in the LAD.    NEURO  AOx3     CARDIOVASCULAR  #IABP   d/t cardiogenic shock   Depending on _ ratio   - wean as tolerated   - pressors??    #3vCAD   DESx3 RCA  Home regimen: Aspirin 81 qD and Clopigrel 75 qD   GDMT: nebivolol 5mg PO (Beta Blocker) and losartan 100 qD  - Hold iso shock     #HTN  Home regimen: amlodipine 5 qD and losartan 100 qD  - Hold iso shock     #HLD   Home regimen: Rosuvastatin 10 mg qD = Atorvastatin 20 qD   - Hold iso shock    PULM/RESPIRATORY   #ADRIÁN   - on BiPAP at baseline    #Asthma  Home regimen: Symbicory 160 mg- 4.5 mcg x 2 puff qDay     GASTROINTESTINAL   Diet: NPO from procedure -> bedside dysphagia?    RENAL  Cr stable 0.97, SHANTEL    GENITOURINARY   #BPH  - zheng     ENDOCRINE  #DM-II   A1c 6.3, estimate average glucose 134  Home regimen: none   - mISS     HEMATOLOGIC   #Anemia   HgB slightly low 12.8, baseline last month 14.6     INFECTIOUS DISEASE  SHANTEL    MSK  #Debility   Post procedure rest for x hours   Then encourage OOB    DERMATOLOGY   SHANTEL    PROPHYLACTIC   F: none indicated    E: replete as needed; Keep K>4, Mg >2   D: DASH/TLC  DVT Proph: Lovenox  Dispo: ccu

## 2024-10-15 NOTE — H&P ADULT - HISTORY OF PRESENT ILLNESS
Cardiologist: Dr. Calle  Pharmacy: [facility]  Escort: Partner    79M, former smoker, w/ PMHx of HTN, HLD, DM-II, 3vCAD (DESx3 RCA, see below)), ADRIÁN on CPAP, asthma, BPH and OA, initially presented to outpt cardiologist for follow up after recent PCI. Since procedure, pt complains is _______. He endorses compliance with DAPT. Pt otherwise denies any ___ CP, palpitations, dizziness, syncope, diaphoresis, fatigue, LE edema, SOB, HOGAN, orthopnea, PND, N/V/D, abd pain, cough, congestion, fever, chills or recent sick contact.    Cardiac Cath 9/18/24 @ [facility]: dLM 20%, pLAD 80%, mLAD 90%, mLCx 50%, pRCA %, RPL diffuse severe dz, LVEF 55%, EDP 5, access R radial.  Cardiac cath 9/20/24 @ [facility]: staged PCI RCA: DESx3 m/pRCA 99%. Plan to return for residual LAD in 4 weeks. Access right femoral access PC.   TTE 9/13/24: LVEF 62%, mild LVH, trace TR, trace AI.  CT FFR 9/11/24: +mLAD-dLAD/m-dLCx/p-m-dRCA    In light of pts risk factors and known unrevascularized CAD, pt now presents to [facility] for recommended cardiac catheterization with possible intervention if clinically indicated.   Cardiologist: Dr. Calle  Pharmacy: [facility]  Escort:     79M, former smoker, w/ PMHx of HTN, HLD, DM-II, 3vCAD (DESx3 RCA, see below), ADRIÁN on CPAP, asthma, BPH and OA who presented to outpt cardiologist for follow up after recent PCI. Since procedure, pt complains of _______. He endorses compliance with DAPT ____. Pt otherwise denies any ___ CP, palpitations, dizziness, syncope, diaphoresis, fatigue, LE edema, SOB, HOGAN, orthopnea, PND, N/V/D, abd pain, cough, congestion, fever, chills or recent sick contact.    Cardiac Cath 9/18/24 @ [facility]: dLM 20%, pLAD 80%, mLAD 90%, mLCx 50%, pRCA %, RPL diffuse severe dz, LVEF 55%, EDP 5, access R radial.  Cardiac cath 9/20/24 @ [facility]: staged PCI RCA: DESx3 m/pRCA 99%. Plan to return for residual LAD in 4 weeks. Access right femoral access PC.   TTE 9/13/24: LVEF 62%, mild LVH, trace TR, trace AI.  CT FFR 9/11/24: +mLAD-dLAD/m-dLCx/p-m-dRCA    In light of pts risk factors and known unrevascularized CAD, pt now presents to [facility] for recommended cardiac catheterization with possible intervention if clinically indicated.   Cardiologist: Dr. Calle  Pharmacy: [facility]  Escort:     79M, former smoker, w/ PMHx of HTN, HLD, DM-II, 3vCAD (DESx3 RCA, see below), ADRIÁN on CPAP, asthma, BPH and OA who presented to outpt cardiologist for follow up after recent PCI. Since procedure, pt complains of _______. He endorses compliance with DAPT ____. Pt otherwise denies any ___ CP, palpitations, dizziness, syncope, diaphoresis, fatigue, LE edema, SOB, HOGAN, orthopnea, PND, N/V/D, abd pain, cough, congestion, fever, chills or recent sick contact.    Cardiac Cath 9/18/24 @ [facility]: dLM 20%, pLAD 80%, mLAD 90%, mLCx 50%, pRCA %, RPL diffuse severe dz, LVEF 55%, EDP 5, access R radial.  Cardiac cath 9/20/24 @ [facility]: staged PCI RCA: DESx3 m/pRCA 99%. Plan to return for residual LAD in 4 weeks. Access right femoral access PC.   TTE 9/13/24: LVEF 62%, mild LVH, trace TR, trace AI.  CT FFR 9/11/24: +mLAD-dLAD/m-dLCx/p-m-dRCA    In light of pts risk factors and known unrevascularized CAD, pt now presents to [facility] for recommended staged PCI to LAD.    Cardiologist: Dr. Calle  Pharmacy: [facility]  Escort: Partner    79M, former smoker, w/ PMHx of HTN, HLD, DM-II, 3vCAD (DESx3 RCA, see below), ADRIÁN on CPAP, asthma, BPH and OA who presented to outpt cardiologist for follow up after recent PCI. Since procedure, pt complains of SOB, fatigue and frequent urination. He endorses compliance with DAPT. Pt otherwise denies any CP, palpitations, dizziness, syncope, diaphoresis, fatigue, LE edema,  orthopnea, PND, N/V/D, abd pain, cough, congestion, fever, chills or recent sick contact.    Cardiac Cath 9/18/24 @ [facility]: dLM 20%, pLAD 80%, mLAD 90%, mLCx 50%, pRCA %, RPL diffuse severe dz, LVEF 55%, EDP 5, access R radial.  Cardiac cath 9/20/24 @ [facility]: staged PCI RCA: DESx3 m/pRCA 99%. Plan to return for residual LAD in 4 weeks. Access right femoral access PC.   TTE 9/13/24: LVEF 62%, mild LVH, trace TR, trace AI.  CT FFR 9/11/24: +mLAD-dLAD/m-dLCx/p-m-dRCA    In light of pt's risk factors and known unvascularized CAD, pt now presents to [facility] for recommended staged PCI to LAD.    Cardiologist: Dr. Calle  Pharmacy: [facility]  Escort: Partner    79M, former smoker, w/ PMHx of HTN, HLD, DM-II, 3vCAD (DESx3 RCA, see below), ADRIÁN on CPAP, asthma, BPH and OA who presented to outpt cardiologist for follow up after recent PCI. Since procedure, pt complains of SOB, fatigue and frequent urination. He endorses compliance with DAPT. Pt otherwise denies any CP, palpitations, dizziness, syncope, diaphoresis, LE edema, orthopnea, PND, N/V/D, abd pain, cough, congestion, fever, chills or recent sick contact.    Cardiac Cath 9/18/24 @ [facility]: dLM 20%, pLAD 80%, mLAD 90%, mLCx 50%, pRCA %, RPL diffuse severe dz, LVEF 55%, EDP 5, access R radial.  Cardiac cath 9/20/24 @ [facility]: staged PCI RCA: DESx3 m/pRCA 99%. Plan to return for residual LAD in 4 weeks. Access right femoral access PC.   TTE 9/13/24: LVEF 62%, mild LVH, trace TR, trace AI.  CT FFR 9/11/24: +mLAD-dLAD/m-dLCx/p-m-dRCA    In light of pt's risk factors and known unvascularized CAD, pt now presents to [facility] for recommended staged PCI to LAD.    79M, former smoker, w/ PMHx of HTN, HLD, DM-II, 3vCAD (DESx3 RCA, see below), ADRIÁN on CPAP, asthma, BPH and OA who presented to outpt cardiologist for follow up after recent PCI. Since procedure, pt complains of SOB, fatigue and frequent urination. He endorses compliance with DAPT. Pt otherwise denies any CP, palpitations, dizziness, syncope, diaphoresis, LE edema, orthopnea, PND, N/V/D, abd pain, cough, congestion, fever, chills or recent sick contact. In light of pt's risk factors and known unvascularized CAD, pt presented to [facility] for recommended staged PCI to LAD. In CCU for post-impella implementation.    Cardiac Cath 9/18/24 @ [facility]: dLM 20%, pLAD 80%, mLAD 90%, mLCx 50%, pRCA %, RPL diffuse severe dz, LVEF 55%, EDP 5, access R radial.  Cardiac cath 9/20/24 @ [facility]: staged PCI RCA: DESx3 m/pRCA 99%. Plan to return for residual LAD in 4 weeks. Access right femoral access PC.   TTE 9/13/24: LVEF 62%, mild LVH, trace TR, trace AI.  CT FFR 9/11/24: +mLAD-dLAD/m-dLCx/p-m-dRCA

## 2024-10-15 NOTE — H&P ADULT - ATTENDING COMMENTS
78 yo man with MVCAD s/p successful PCI of RCA came for rota-assisted PCI of LAD  Procedure was complicated by no-reflow with final ADINA 1 flow   IABP was placed to aid in coronary perfusion - hemodynamically was stable throughout   Transferred to CICU for ongoing care     - Start heparin gtt 4h after CCL goal PTT 50-70s  - Continue DAPT   - NTG gtt to treat chest pain   - 500 cc NS IV   - CXR to confirm IABP position  - NPO until more stable     Juancarlos Hoyos MD

## 2024-10-15 NOTE — H&P ADULT - NSHPLABSRESULTS_GEN_ALL_CORE
12.8   5.11  )-----------( 122      ( 21 Oct 2024 08:41 )             41.2       10-21    141  |  105  |  17  ----------------------------<  105[H]  4.2   |  28  |  0.97    Ca    9.3      21 Oct 2024 08:41  Mg     1.9     10-21    TPro  6.8  /  Alb  4.0  /  TBili  0.3  /  DBili  x   /  AST  See Note  /  ALT  21  /  AlkPhos  51  10-21      PT/INR - ( 21 Oct 2024 08:41 )   PT: 11.9 sec;   INR: 1.04          PTT - ( 21 Oct 2024 08:41 )  PTT:27.4 sec    CARDIAC MARKERS ( 21 Oct 2024 08:41 )  x     / x     / x     / x     / 3.8 ng/mL        Urinalysis Basic - ( 21 Oct 2024 08:41 )    Color: x / Appearance: x / SG: x / pH: x  Gluc: 105 mg/dL / Ketone: x  / Bili: x / Urobili: x   Blood: x / Protein: x / Nitrite: x   Leuk Esterase: x / RBC: x / WBC x   Sq Epi: x / Non Sq Epi: x / Bacteria: x

## 2024-10-15 NOTE — H&P ADULT - NSHPPHYSICALEXAM_GEN_ALL_CORE
T(C): --  HR: --  BP: --  RR: --  SpO2: --    CONSTITUTIONAL: Well groomed, no apparent distress  EYES: PERRLA and symmetric, EOMI, No conjunctival or scleral injection, non-icteric  ENMT: Oral mucosa with moist membranes. Normal dentition; no pharyngeal injection or exudates             NECK: Supple, symmetric and without tracheal deviation   RESP: No respiratory distress, no use of accessory muscles; CTA b/l, no WRR  CV: RRR, +S1S2, no MRG; no JVD; no peripheral edema  GI: Soft, NT, ND, no rebound, no guarding; no palpable masses; no hepatosplenomegaly; no hernia palpated  LYMPH: No cervical LAD or tenderness; no axillary LAD or tenderness; no inguinal LAD or tenderness  MSK: Normal gait; No digital clubbing or cyanosis; examination of the (head/neck/spine/ribs/pelvis, RUE, LUE, RLE, LLE) without misalignment,            Normal ROM without pain, no spinal tenderness, normal muscle strength/tone  SKIN: No rashes or ulcers noted; no subcutaneous nodules or induration palpable  NEURO: CN II-XII intact; normal reflexes in upper and lower extremities, sensation intact in upper and lower extremities b/l to light touch   PSYCH: Appropriate insight/judgment; A+O x 3, mood and affect appropriate, recent/remote memory intact T(C): --  HR: --  BP: --  RR: --  SpO2: --    CONSTITUTIONAL: Well groomed, no apparent distress  EYES: PERRLA and symmetric, EOMI, No conjunctival or scleral injection, non-icteric  ENMT: Oral mucosa with moist membranes. Normal dentition; no pharyngeal injection or exudates             NECK: Supple, symmetric and without tracheal deviation   RESP: No respiratory distress, no use of accessory muscles; CTA b/l, no WRR  CV: RRR, +S1S2, no MRG; no JVD; no peripheral edema  GI: Soft, NT, ND, no rebound, no guarding; no palpable masses; no hepatosplenomegaly; no hernia palpated  NEURO: Moves all 4 extremities spontaneously   PSYCH: Appropriate insight/judgment; A+O x 3, mood and affect appropriate, recent/remote memory intact

## 2024-10-21 ENCOUNTER — INPATIENT (INPATIENT)
Facility: HOSPITAL | Age: 79
LOS: 7 days | Discharge: HOME | End: 2024-10-29
Attending: INTERNAL MEDICINE | Admitting: STUDENT IN AN ORGANIZED HEALTH CARE EDUCATION/TRAINING PROGRAM
Payer: MEDICARE

## 2024-10-21 DIAGNOSIS — Z98.890 OTHER SPECIFIED POSTPROCEDURAL STATES: Chronic | ICD-10-CM

## 2024-10-21 DIAGNOSIS — Z96.652 PRESENCE OF LEFT ARTIFICIAL KNEE JOINT: Chronic | ICD-10-CM

## 2024-10-21 LAB
A1C WITH ESTIMATED AVERAGE GLUCOSE RESULT: 6.3 % — HIGH (ref 4–5.6)
ADD ON TEST-SPECIMEN IN LAB: SIGNIFICANT CHANGE UP
ALBUMIN SERPL ELPH-MCNC: 4 G/DL — SIGNIFICANT CHANGE UP (ref 3.3–5)
ALBUMIN SERPL ELPH-MCNC: 4 G/DL — SIGNIFICANT CHANGE UP (ref 3.3–5)
ALP SERPL-CCNC: 51 U/L — SIGNIFICANT CHANGE UP (ref 40–120)
ALP SERPL-CCNC: 53 U/L — SIGNIFICANT CHANGE UP (ref 40–120)
ALT FLD-CCNC: 20 U/L — SIGNIFICANT CHANGE UP (ref 10–45)
ALT FLD-CCNC: 21 U/L — SIGNIFICANT CHANGE UP (ref 10–45)
ANION GAP SERPL CALC-SCNC: 10 MMOL/L — SIGNIFICANT CHANGE UP (ref 5–17)
ANION GAP SERPL CALC-SCNC: 8 MMOL/L — SIGNIFICANT CHANGE UP (ref 5–17)
APTT BLD: 27.4 SEC — SIGNIFICANT CHANGE UP (ref 24.5–35.6)
APTT BLD: 37.4 SEC — HIGH (ref 24.5–35.6)
APTT BLD: >200 SEC — CRITICAL HIGH (ref 24.5–35.6)
AST SERPL-CCNC: 28 U/L — SIGNIFICANT CHANGE UP (ref 10–40)
AST SERPL-CCNC: SIGNIFICANT CHANGE UP (ref 10–40)
BASOPHILS # BLD AUTO: 0.02 K/UL — SIGNIFICANT CHANGE UP (ref 0–0.2)
BASOPHILS # BLD AUTO: 0.03 K/UL — SIGNIFICANT CHANGE UP (ref 0–0.2)
BASOPHILS NFR BLD AUTO: 0.3 % — SIGNIFICANT CHANGE UP (ref 0–2)
BASOPHILS NFR BLD AUTO: 0.4 % — SIGNIFICANT CHANGE UP (ref 0–2)
BILIRUB SERPL-MCNC: 0.3 MG/DL — SIGNIFICANT CHANGE UP (ref 0.2–1.2)
BILIRUB SERPL-MCNC: 0.6 MG/DL — SIGNIFICANT CHANGE UP (ref 0.2–1.2)
BUN SERPL-MCNC: 13 MG/DL — SIGNIFICANT CHANGE UP (ref 7–23)
BUN SERPL-MCNC: 17 MG/DL — SIGNIFICANT CHANGE UP (ref 7–23)
CALCIUM SERPL-MCNC: 8.7 MG/DL — SIGNIFICANT CHANGE UP (ref 8.4–10.5)
CALCIUM SERPL-MCNC: 9.3 MG/DL — SIGNIFICANT CHANGE UP (ref 8.4–10.5)
CHLORIDE SERPL-SCNC: 104 MMOL/L — SIGNIFICANT CHANGE UP (ref 96–108)
CHLORIDE SERPL-SCNC: 105 MMOL/L — SIGNIFICANT CHANGE UP (ref 96–108)
CHOLEST SERPL-MCNC: 107 MG/DL — SIGNIFICANT CHANGE UP
CK MB CFR SERPL CALC: 3.8 NG/ML — SIGNIFICANT CHANGE UP (ref 0–6.7)
CK SERPL-CCNC: 171 U/L — SIGNIFICANT CHANGE UP (ref 30–200)
CO2 SERPL-SCNC: 24 MMOL/L — SIGNIFICANT CHANGE UP (ref 22–31)
CO2 SERPL-SCNC: 28 MMOL/L — SIGNIFICANT CHANGE UP (ref 22–31)
CREAT SERPL-MCNC: 0.91 MG/DL — SIGNIFICANT CHANGE UP (ref 0.5–1.3)
CREAT SERPL-MCNC: 0.97 MG/DL — SIGNIFICANT CHANGE UP (ref 0.5–1.3)
EGFR: 78 ML/MIN/1.73M2 — SIGNIFICANT CHANGE UP
EGFR: 85 ML/MIN/1.73M2 — SIGNIFICANT CHANGE UP
EOSINOPHIL # BLD AUTO: 0.04 K/UL — SIGNIFICANT CHANGE UP (ref 0–0.5)
EOSINOPHIL # BLD AUTO: 0.1 K/UL — SIGNIFICANT CHANGE UP (ref 0–0.5)
EOSINOPHIL NFR BLD AUTO: 0.5 % — SIGNIFICANT CHANGE UP (ref 0–6)
EOSINOPHIL NFR BLD AUTO: 2 % — SIGNIFICANT CHANGE UP (ref 0–6)
ESTIMATED AVERAGE GLUCOSE: 134 MG/DL — HIGH (ref 68–114)
GLUCOSE BLDC GLUCOMTR-MCNC: 108 MG/DL — HIGH (ref 70–99)
GLUCOSE SERPL-MCNC: 105 MG/DL — HIGH (ref 70–99)
GLUCOSE SERPL-MCNC: 126 MG/DL — HIGH (ref 70–99)
HCT VFR BLD CALC: 40.4 % — SIGNIFICANT CHANGE UP (ref 39–50)
HCT VFR BLD CALC: 41.2 % — SIGNIFICANT CHANGE UP (ref 39–50)
HDLC SERPL-MCNC: 46 MG/DL — SIGNIFICANT CHANGE UP
HGB BLD-MCNC: 12.8 G/DL — LOW (ref 13–17)
HGB BLD-MCNC: 12.9 G/DL — LOW (ref 13–17)
IMM GRANULOCYTES NFR BLD AUTO: 0.3 % — SIGNIFICANT CHANGE UP (ref 0–0.9)
IMM GRANULOCYTES NFR BLD AUTO: 0.4 % — SIGNIFICANT CHANGE UP (ref 0–0.9)
INR BLD: 1.04 — SIGNIFICANT CHANGE UP (ref 0.85–1.16)
INR BLD: 1.19 — HIGH (ref 0.85–1.16)
LIPID PNL WITH DIRECT LDL SERPL: 50 MG/DL — SIGNIFICANT CHANGE UP
LYMPHOCYTES # BLD AUTO: 0.71 K/UL — LOW (ref 1–3.3)
LYMPHOCYTES # BLD AUTO: 1.09 K/UL — SIGNIFICANT CHANGE UP (ref 1–3.3)
LYMPHOCYTES # BLD AUTO: 21.3 % — SIGNIFICANT CHANGE UP (ref 13–44)
LYMPHOCYTES # BLD AUTO: 8.1 % — LOW (ref 13–44)
MAGNESIUM SERPL-MCNC: 1.7 MG/DL — SIGNIFICANT CHANGE UP (ref 1.6–2.6)
MAGNESIUM SERPL-MCNC: 1.9 MG/DL — SIGNIFICANT CHANGE UP (ref 1.6–2.6)
MCHC RBC-ENTMCNC: 26.3 PG — LOW (ref 27–34)
MCHC RBC-ENTMCNC: 26.3 PG — LOW (ref 27–34)
MCHC RBC-ENTMCNC: 31.1 GM/DL — LOW (ref 32–36)
MCHC RBC-ENTMCNC: 31.9 GM/DL — LOW (ref 32–36)
MCV RBC AUTO: 82.4 FL — SIGNIFICANT CHANGE UP (ref 80–100)
MCV RBC AUTO: 84.8 FL — SIGNIFICANT CHANGE UP (ref 80–100)
MONOCYTES # BLD AUTO: 0.55 K/UL — SIGNIFICANT CHANGE UP (ref 0–0.9)
MONOCYTES # BLD AUTO: 0.59 K/UL — SIGNIFICANT CHANGE UP (ref 0–0.9)
MONOCYTES NFR BLD AUTO: 11.5 % — SIGNIFICANT CHANGE UP (ref 2–14)
MONOCYTES NFR BLD AUTO: 6.3 % — SIGNIFICANT CHANGE UP (ref 2–14)
NEUTROPHILS # BLD AUTO: 3.29 K/UL — SIGNIFICANT CHANGE UP (ref 1.8–7.4)
NEUTROPHILS # BLD AUTO: 7.42 K/UL — HIGH (ref 1.8–7.4)
NEUTROPHILS NFR BLD AUTO: 64.4 % — SIGNIFICANT CHANGE UP (ref 43–77)
NEUTROPHILS NFR BLD AUTO: 84.5 % — HIGH (ref 43–77)
NON HDL CHOLESTEROL: 61 MG/DL — SIGNIFICANT CHANGE UP
NRBC # BLD: 0 /100 WBCS — SIGNIFICANT CHANGE UP (ref 0–0)
NRBC # BLD: 0 /100 WBCS — SIGNIFICANT CHANGE UP (ref 0–0)
PHOSPHATE SERPL-MCNC: 3.1 MG/DL — SIGNIFICANT CHANGE UP (ref 2.5–4.5)
PLATELET # BLD AUTO: 122 K/UL — LOW (ref 150–400)
PLATELET # BLD AUTO: 128 K/UL — LOW (ref 150–400)
POTASSIUM SERPL-MCNC: 3.5 MMOL/L — SIGNIFICANT CHANGE UP (ref 3.5–5.3)
POTASSIUM SERPL-MCNC: 4.2 MMOL/L — SIGNIFICANT CHANGE UP (ref 3.5–5.3)
POTASSIUM SERPL-SCNC: 3.5 MMOL/L — SIGNIFICANT CHANGE UP (ref 3.5–5.3)
POTASSIUM SERPL-SCNC: 4.2 MMOL/L — SIGNIFICANT CHANGE UP (ref 3.5–5.3)
PROT SERPL-MCNC: 6.5 G/DL — SIGNIFICANT CHANGE UP (ref 6–8.3)
PROT SERPL-MCNC: 6.8 G/DL — SIGNIFICANT CHANGE UP (ref 6–8.3)
PROTHROM AB SERPL-ACNC: 11.9 SEC — SIGNIFICANT CHANGE UP (ref 9.9–13.4)
PROTHROM AB SERPL-ACNC: 13.7 SEC — HIGH (ref 9.9–13.4)
RBC # BLD: 4.86 M/UL — SIGNIFICANT CHANGE UP (ref 4.2–5.8)
RBC # BLD: 4.9 M/UL — SIGNIFICANT CHANGE UP (ref 4.2–5.8)
RBC # FLD: 13.5 % — SIGNIFICANT CHANGE UP (ref 10.3–14.5)
RBC # FLD: 13.6 % — SIGNIFICANT CHANGE UP (ref 10.3–14.5)
SODIUM SERPL-SCNC: 138 MMOL/L — SIGNIFICANT CHANGE UP (ref 135–145)
SODIUM SERPL-SCNC: 141 MMOL/L — SIGNIFICANT CHANGE UP (ref 135–145)
TRIGL SERPL-MCNC: 39 MG/DL — SIGNIFICANT CHANGE UP
TROPONIN T, HIGH SENSITIVITY RESULT: 271 NG/L — CRITICAL HIGH (ref 0–51)
TROPONIN T, HIGH SENSITIVITY RESULT: 3055 NG/L — CRITICAL HIGH (ref 0–51)
WBC # BLD: 5.11 K/UL — SIGNIFICANT CHANGE UP (ref 3.8–10.5)
WBC # BLD: 8.78 K/UL — SIGNIFICANT CHANGE UP (ref 3.8–10.5)
WBC # FLD AUTO: 5.11 K/UL — SIGNIFICANT CHANGE UP (ref 3.8–10.5)
WBC # FLD AUTO: 8.78 K/UL — SIGNIFICANT CHANGE UP (ref 3.8–10.5)

## 2024-10-21 PROCEDURE — 93010 ELECTROCARDIOGRAM REPORT: CPT

## 2024-10-21 PROCEDURE — 92978 ENDOLUMINL IVUS OCT C 1ST: CPT | Mod: 26,LD

## 2024-10-21 PROCEDURE — 71045 X-RAY EXAM CHEST 1 VIEW: CPT | Mod: 26

## 2024-10-21 PROCEDURE — 71045 X-RAY EXAM CHEST 1 VIEW: CPT | Mod: 26,77

## 2024-10-21 PROCEDURE — 93010 ELECTROCARDIOGRAM REPORT: CPT | Mod: 77

## 2024-10-21 PROCEDURE — 92933 PRQ TRLML C ATHRC ST ANGIOP1: CPT | Mod: LD

## 2024-10-21 PROCEDURE — 93458 L HRT ARTERY/VENTRICLE ANGIO: CPT | Mod: 26,59

## 2024-10-21 PROCEDURE — 33967 INSERT I-AORT PERCUT DEVICE: CPT

## 2024-10-21 PROCEDURE — 99291 CRITICAL CARE FIRST HOUR: CPT

## 2024-10-21 RX ORDER — ASPIRIN/MAG CARB/ALUMINUM AMIN 325 MG
81 TABLET ORAL ONCE
Refills: 0 | Status: COMPLETED | OUTPATIENT
Start: 2024-10-21 | End: 2024-10-21

## 2024-10-21 RX ORDER — ASPIRIN/MAG CARB/ALUMINUM AMIN 325 MG
81 TABLET ORAL DAILY
Refills: 0 | Status: DISCONTINUED | OUTPATIENT
Start: 2024-10-22 | End: 2024-10-29

## 2024-10-21 RX ORDER — CLOPIDOGREL 75 MG/1
75 TABLET ORAL DAILY
Refills: 0 | Status: DISCONTINUED | OUTPATIENT
Start: 2024-10-22 | End: 2024-10-28

## 2024-10-21 RX ORDER — NITROGLYCERIN 0.6MG/HR
10 PATCH, TRANSDERMAL 24 HOURS TRANSDERMAL
Qty: 50 | Refills: 0 | Status: DISCONTINUED | OUTPATIENT
Start: 2024-10-21 | End: 2024-10-22

## 2024-10-21 RX ORDER — MAGNESIUM OXIDE 400 MG/1
400 TABLET ORAL ONCE
Refills: 0 | Status: COMPLETED | OUTPATIENT
Start: 2024-10-21 | End: 2024-10-21

## 2024-10-21 RX ORDER — MAGNESIUM SULFATE IN 0.9% NACL 2 G/50 ML
2 INTRAVENOUS SOLUTION, PIGGYBACK (ML) INTRAVENOUS ONCE
Refills: 0 | Status: COMPLETED | OUTPATIENT
Start: 2024-10-21 | End: 2024-10-21

## 2024-10-21 RX ORDER — CLOPIDOGREL 75 MG/1
75 TABLET ORAL DAILY
Refills: 0 | Status: DISCONTINUED | OUTPATIENT
Start: 2024-10-21 | End: 2024-10-21

## 2024-10-21 RX ORDER — POTASSIUM CHLORIDE 10 MEQ
20 TABLET, EXTENDED RELEASE ORAL
Refills: 0 | Status: DISCONTINUED | OUTPATIENT
Start: 2024-10-21 | End: 2024-10-21

## 2024-10-21 RX ORDER — POTASSIUM CHLORIDE 10 MEQ
40 TABLET, EXTENDED RELEASE ORAL ONCE
Refills: 0 | Status: COMPLETED | OUTPATIENT
Start: 2024-10-21 | End: 2024-10-21

## 2024-10-21 RX ORDER — ASPIRIN/MAG CARB/ALUMINUM AMIN 325 MG
81 TABLET ORAL DAILY
Refills: 0 | Status: DISCONTINUED | OUTPATIENT
Start: 2024-10-21 | End: 2024-10-21

## 2024-10-21 RX ORDER — SODIUM CHLORIDE 9 MG/ML
250 INJECTION, SOLUTION INTRAMUSCULAR; INTRAVENOUS; SUBCUTANEOUS ONCE
Refills: 0 | Status: COMPLETED | OUTPATIENT
Start: 2024-10-21 | End: 2024-10-21

## 2024-10-21 RX ORDER — CLOPIDOGREL 75 MG/1
75 TABLET ORAL ONCE
Refills: 0 | Status: DISCONTINUED | OUTPATIENT
Start: 2024-10-21 | End: 2024-10-21

## 2024-10-21 RX ORDER — SODIUM CHLORIDE 9 MG/ML
500 INJECTION, SOLUTION INTRAMUSCULAR; INTRAVENOUS; SUBCUTANEOUS ONCE
Refills: 0 | Status: COMPLETED | OUTPATIENT
Start: 2024-10-21 | End: 2024-10-21

## 2024-10-21 RX ADMIN — Medication 25 GRAM(S): at 17:07

## 2024-10-21 RX ADMIN — SODIUM CHLORIDE 500 MILLILITER(S): 9 INJECTION, SOLUTION INTRAMUSCULAR; INTRAVENOUS; SUBCUTANEOUS at 09:28

## 2024-10-21 RX ADMIN — Medication 50 MILLIEQUIVALENT(S): at 17:08

## 2024-10-21 RX ADMIN — Medication 50 MILLIEQUIVALENT(S): at 18:52

## 2024-10-21 RX ADMIN — MAGNESIUM OXIDE 400 MILLIGRAM(S): 400 TABLET ORAL at 11:01

## 2024-10-21 RX ADMIN — Medication 3 MICROGRAM(S)/MIN: at 17:07

## 2024-10-21 RX ADMIN — SODIUM CHLORIDE 75 MILLILITER(S): 9 INJECTION, SOLUTION INTRAMUSCULAR; INTRAVENOUS; SUBCUTANEOUS at 09:28

## 2024-10-21 RX ADMIN — SODIUM CHLORIDE 500 MILLILITER(S): 9 INJECTION, SOLUTION INTRAMUSCULAR; INTRAVENOUS; SUBCUTANEOUS at 18:09

## 2024-10-21 RX ADMIN — Medication 40 MILLIEQUIVALENT(S): at 19:41

## 2024-10-21 RX ADMIN — Medication 80 MILLIGRAM(S): at 22:31

## 2024-10-21 RX ADMIN — Medication 81 MILLIGRAM(S): at 09:38

## 2024-10-21 NOTE — PROGRESS NOTE ADULT - SUBJECTIVE AND OBJECTIVE BOX
Internal Medicine Progress Note  79M, former smoker, w/ PMHx of HTN, HLD, DM-II, 3vCAD (DESx3 RCA, see below), ADRIÁN on CPAP, asthma, BPH and OA who presented to outpt cardiologist for follow up after recent PCI. Since procedure, pt complains of SOB, fatigue and frequent urination. He endorses compliance with DAPT. Pt otherwise denies any CP, palpitations, dizziness, syncope, diaphoresis, LE edema, orthopnea, PND, N/V/D, abd pain, cough, congestion, fever, chills or recent sick contact. In light of pt's risk factors and known unvascularized CAD, pt presented to [facility] for recommended staged PCI to LAD. In CCU for post-impella implementation.    Cardiac Cath 9/18/24 @ [facility]: dLM 20%, pLAD 80%, mLAD 90%, mLCx 50%, pRCA %, RPL diffuse severe dz, LVEF 55%, EDP 5, access R radial.  Cardiac cath 9/20/24 @ [facility]: staged PCI RCA: DESx3 m/pRCA 99%. Plan to return for residual LAD in 4 weeks. Access right femoral access PC.   TTE 9/13/24: LVEF 62%, mild LVH, trace TR, trace AI.  CT FFR 9/11/24: +mLAD-dLAD/m-dLCx/p-m-dRCA    OBJECTIVE:  Vital Signs Last 24 Hrs  T(C): --  T(F): --  HR: --  BP: --  BP(mean): --  RR: --  SpO2: --      I&O's Detail      CONSTITUTIONAL: Well groomed, no apparent distress  EYES: PERRLA and symmetric, EOMI, No conjunctival or scleral injection, non-icteric  ENMT: Oral mucosa with moist membranes. Normal dentition; no pharyngeal injection or exudates             NECK: Supple, symmetric and without tracheal deviation   RESP: No respiratory distress, no use of accessory muscles; CTA b/l, no WRR  CV: RRR, +S1S2, no MRG; no JVD; no peripheral edema  GI: Soft, NT, ND, no rebound, no guarding; no palpable masses; no hepatosplenomegaly; no hernia palpated  NEURO: Moves all 4 extremities spontaneously   PSYCH: Appropriate insight/judgment; A+O x 3, mood and affect appropriate, recent/remote memory intact    Medications:  MEDICATIONS  (STANDING):  chlorhexidine 4% Liquid 1 Application(s) Topical once  sodium chloride 0.9%. 1000 milliLiter(s) (75 mL/Hr) IV Continuous <Continuous>    MEDICATIONS  (PRN):      Labs:                        12.8   5.11  )-----------( 122      ( 21 Oct 2024 08:41 )             41.2     10-21    141  |  105  |  17  ----------------------------<  105[H]  4.2   |  28  |  0.97    Ca    9.3      21 Oct 2024 08:41  Mg     1.9     10-21    TPro  6.8  /  Alb  4.0  /  TBili  0.3  /  DBili  x   /  AST  See Note  /  ALT  21  /  AlkPhos  51  10-21    PT/INR - ( 21 Oct 2024 08:41 )   PT: 11.9 sec;   INR: 1.04          PTT - ( 21 Oct 2024 08:41 )  PTT:27.4 sec    Urinalysis Basic - ( 21 Oct 2024 08:41 )    Color: x / Appearance: x / SG: x / pH: x  Gluc: 105 mg/dL / Ketone: x  / Bili: x / Urobili: x   Blood: x / Protein: x / Nitrite: x   Leuk Esterase: x / RBC: x / WBC x   Sq Epi: x / Non Sq Epi: x / Bacteria: x          Radiology: Reviewed Internal Medicine Progress Note  79M, former smoker, w/ PMHx of HTN, HLD, DM-II, 3vCAD (DESx3 RCA, see below), ADRIÁN on CPAP, asthma, BPH and OA who presented to outpt cardiologist for follow up after recent PCI. Since procedure, pt complains of SOB, fatigue and frequent urination. He endorses compliance with DAPT. Pt otherwise denies any CP, palpitations, dizziness, syncope, diaphoresis, LE edema, orthopnea, PND, N/V/D, abd pain, cough, congestion, fever, chills or recent sick contact. In light of pt's risk factors and known unvascularized CAD, pt presented to [facility] for recommended staged PCI to LAD. In CCU for post-impella implementation.    Cardiac Cath 9/18/24 @ [facility]: dLM 20%, pLAD 80%, mLAD 90%, mLCx 50%, pRCA %, RPL diffuse severe dz, LVEF 55%, EDP 5, access R radial.  Cardiac cath 9/20/24 @ [facility]: staged PCI RCA: DESx3 m/pRCA 99%. Plan to return for residual LAD in 4 weeks. Access right femoral access PC.   TTE 9/13/24: LVEF 62%, mild LVH, trace TR, trace AI.  CT FFR 9/11/24: +mLAD-dLAD/m-dLCx/p-m-dRCA    OBJECTIVE:  Vital Signs Last 24 Hrs  T(C): --  T(F): --  HR: --  BP: --  BP(mean): --  RR: --  SpO2: --      I&O's Detail    PHYSICAL   CONSTITUTIONAL: Well groomed, no apparent distress  EYES: PERRLA and symmetric, EOMI, No conjunctival or scleral injection, non-icteric  ENMT: Oral mucosa with moist membranes. Normal dentition; no pharyngeal injection or exudates             NECK: Supple, symmetric and without tracheal deviation   RESP: No respiratory distress, no use of accessory muscles; CTA b/l, no WRR  CV: RRR, Chest TTP, +S1S2, no MRG; no JVD; no peripheral edema  : w/ condom cath   GI: Soft, NT, ND, no rebound, no guarding; no palpable masses; no hepatosplenomegaly; no hernia palpated  NEURO: Moves all 4 extremities spontaneously   PSYCH: Appropriate insight/judgment; A+O x 3, mood and affect appropriate, recent/remote memory intact    Medications:  MEDICATIONS  (STANDING):  chlorhexidine 4% Liquid 1 Application(s) Topical once  sodium chloride 0.9%. 1000 milliLiter(s) (75 mL/Hr) IV Continuous <Continuous>    MEDICATIONS  (PRN):      Labs:                        12.8   5.11  )-----------( 122      ( 21 Oct 2024 08:41 )             41.2     10-21    141  |  105  |  17  ----------------------------<  105[H]  4.2   |  28  |  0.97    Ca    9.3      21 Oct 2024 08:41  Mg     1.9     10-21    TPro  6.8  /  Alb  4.0  /  TBili  0.3  /  DBili  x   /  AST  See Note  /  ALT  21  /  AlkPhos  51  10-21    PT/INR - ( 21 Oct 2024 08:41 )   PT: 11.9 sec;   INR: 1.04          PTT - ( 21 Oct 2024 08:41 )  PTT:27.4 sec    Urinalysis Basic - ( 21 Oct 2024 08:41 )    Color: x / Appearance: x / SG: x / pH: x  Gluc: 105 mg/dL / Ketone: x  / Bili: x / Urobili: x   Blood: x / Protein: x / Nitrite: x   Leuk Esterase: x / RBC: x / WBC x   Sq Epi: x / Non Sq Epi: x / Bacteria: x          Radiology: Reviewed Internal Medicine Progress Note  79M, former smoker, w/ PMHx of HTN, HLD, DM-II, 3vCAD (DESx3 RCA, see below), ADRIÁN on CPAP, asthma, BPH and OA who presented to outpt cardiologist for follow up after recent PCI. Since procedure, pt complains of SOB, fatigue and frequent urination. He endorses compliance with DAPT. Pt otherwise denies any CP, palpitations, dizziness, syncope, diaphoresis, LE edema, orthopnea, PND, N/V/D, abd pain, cough, congestion, fever, chills or recent sick contact. In light of pt's risk factors and known unvascularized CAD, pt presented to [facility] for recommended staged PCI to LAD. In CCU for IABP implementation.    Cardiac Cath 9/18/24 @ [facility]: dLM 20%, pLAD 80%, mLAD 90%, mLCx 50%, pRCA %, RPL diffuse severe dz, LVEF 55%, EDP 5, access R radial.  Cardiac cath 9/20/24 @ [facility]: staged PCI RCA: DESx3 m/pRCA 99%. Plan to return for residual LAD in 4 weeks. Access right femoral access PC.   TTE 9/13/24: LVEF 62%, mild LVH, trace TR, trace AI.  CT FFR 9/11/24: +mLAD-dLAD/m-dLCx/p-m-dRCA    OBJECTIVE:  Vital Signs Last 24 Hrs  T(C): --  T(F): --  HR: --  BP: --  BP(mean): --  RR: --  SpO2: --      I&O's Detail    PHYSICAL   CONSTITUTIONAL: Well groomed, no apparent distress  EYES: PERRLA and symmetric, EOMI, No conjunctival or scleral injection, non-icteric  ENMT: Oral mucosa with moist membranes. Normal dentition; no pharyngeal injection or exudates             NECK: Supple, symmetric and without tracheal deviation   RESP: No respiratory distress, no use of accessory muscles; CTA b/l, no WRR  CV: RRR, Chest TTP, +S1S2, no MRG; no JVD; no peripheral edema  : w/ condom cath   GI: Soft, NT, ND, no rebound, no guarding; no palpable masses; no hepatosplenomegaly; no hernia palpated  NEURO: Moves all 4 extremities spontaneously   PSYCH: Appropriate insight/judgment; A+O x 3, mood and affect appropriate, recent/remote memory intact    Medications:  MEDICATIONS  (STANDING):  chlorhexidine 4% Liquid 1 Application(s) Topical once  sodium chloride 0.9%. 1000 milliLiter(s) (75 mL/Hr) IV Continuous <Continuous>    MEDICATIONS  (PRN):      Labs:                        12.8   5.11  )-----------( 122      ( 21 Oct 2024 08:41 )             41.2     10-21    141  |  105  |  17  ----------------------------<  105[H]  4.2   |  28  |  0.97    Ca    9.3      21 Oct 2024 08:41  Mg     1.9     10-21    TPro  6.8  /  Alb  4.0  /  TBili  0.3  /  DBili  x   /  AST  See Note  /  ALT  21  /  AlkPhos  51  10-21    PT/INR - ( 21 Oct 2024 08:41 )   PT: 11.9 sec;   INR: 1.04          PTT - ( 21 Oct 2024 08:41 )  PTT:27.4 sec    Urinalysis Basic - ( 21 Oct 2024 08:41 )    Color: x / Appearance: x / SG: x / pH: x  Gluc: 105 mg/dL / Ketone: x  / Bili: x / Urobili: x   Blood: x / Protein: x / Nitrite: x   Leuk Esterase: x / RBC: x / WBC x   Sq Epi: x / Non Sq Epi: x / Bacteria: x          Radiology: Reviewed Internal Medicine Progress Note  79M, former smoker, w/ PMHx of HTN, HLD, DM-II, 3vCAD (DESx3 RCA, see below), ADRIÁN on CPAP, asthma, BPH and OA who presented to outpt cardiologist for follow up after recent PCI. Since procedure, pt complains of SOB, fatigue and frequent urination. He endorses compliance with DAPT. Pt otherwise denies any CP, palpitations, dizziness, syncope, diaphoresis, LE edema, orthopnea, PND, N/V/D, abd pain, cough, congestion, fever, chills or recent sick contact. In light of pt's risk factors and known unvascularized CAD, pt presented to [facility] for recommended staged PCI to LAD. In CCU for post procedural monitoring of IABP placement to improve flow in the LAD.    Cardiac Cath 9/18/24 @ [facility]: dLM 20%, pLAD 80%, mLAD 90%, mLCx 50%, pRCA %, RPL diffuse severe dz, LVEF 55%, EDP 5, access R radial.  Cardiac cath 9/20/24 @ [facility]: staged PCI RCA: DESx3 m/pRCA 99%. Plan to return for residual LAD in 4 weeks. Access right femoral access PC.   TTE 9/13/24: LVEF 62%, mild LVH, trace TR, trace AI.  CT FFR 9/11/24: +mLAD-dLAD/m-dLCx/p-m-dRCA    OBJECTIVE:  Vital Signs Last 24 Hrs  T(C): --  T(F): --  HR: --  BP: --  BP(mean): --  RR: --  SpO2: --      I&O's Detail    PHYSICAL   CONSTITUTIONAL: Well groomed, no apparent distress  EYES: PERRLA and symmetric, EOMI, No conjunctival or scleral injection, non-icteric  ENMT: Oral mucosa with moist membranes. Normal dentition; no pharyngeal injection or exudates             NECK: Supple, symmetric and without tracheal deviation   RESP: No respiratory distress, no use of accessory muscles; CTA b/l, no WRR  CV: RRR, Chest TTP, +S1S2, no MRG; no JVD; no peripheral edema  : w/ condom cath   GI: Soft, NT, ND, no rebound, no guarding; no palpable masses; no hepatosplenomegaly; no hernia palpated  NEURO: Moves all 4 extremities spontaneously   PSYCH: Appropriate insight/judgment; A+O x 3, mood and affect appropriate, recent/remote memory intact    Medications:  MEDICATIONS  (STANDING):  chlorhexidine 4% Liquid 1 Application(s) Topical once  sodium chloride 0.9%. 1000 milliLiter(s) (75 mL/Hr) IV Continuous <Continuous>    MEDICATIONS  (PRN):      Labs:                        12.8   5.11  )-----------( 122      ( 21 Oct 2024 08:41 )             41.2     10-21    141  |  105  |  17  ----------------------------<  105[H]  4.2   |  28  |  0.97    Ca    9.3      21 Oct 2024 08:41  Mg     1.9     10-21    TPro  6.8  /  Alb  4.0  /  TBili  0.3  /  DBili  x   /  AST  See Note  /  ALT  21  /  AlkPhos  51  10-21    PT/INR - ( 21 Oct 2024 08:41 )   PT: 11.9 sec;   INR: 1.04          PTT - ( 21 Oct 2024 08:41 )  PTT:27.4 sec    Urinalysis Basic - ( 21 Oct 2024 08:41 )    Color: x / Appearance: x / SG: x / pH: x  Gluc: 105 mg/dL / Ketone: x  / Bili: x / Urobili: x   Blood: x / Protein: x / Nitrite: x   Leuk Esterase: x / RBC: x / WBC x   Sq Epi: x / Non Sq Epi: x / Bacteria: x          Radiology: Reviewed

## 2024-10-21 NOTE — PATIENT PROFILE ADULT - FALL HARM RISK - PATIENT NEEDS ASSISTANCE
If weight gain 3 lb weight gain in 2 days please give extra Bumex 1 mg tablet on call me  Increase Detrol LA from 2 mg up to 4 mg daily  Ace wrap right upper extremity daily for 7 days and then discontinue  Try to elevate right upper extremity on a [place] or a pillow as close as possible to the heart level. Avoid dangling your right arm  Continue the antibiotic and completed the dose  Please call if any worsening symptoms  Continue the same medications  2 g salt diet restriction  Continue with water restriction  Use a walker  Right avoid putting pressure using right upper extremity.   Contin[facility]  Continue with hydrocodone twice daily scheduled for pain control  Call if any questions or concerns  See me in 2 months with the same day stat nonfasting CBC CMP, mag, A1C and TSH Standing/Walking

## 2024-10-21 NOTE — PROGRESS NOTE ADULT - ASSESSMENT
79M, former smoker, w/ PMHx of HTN, HLD, DM-II, 3vCAD (DESx3 RCA, see below), ADRIÁN on CPAP, asthma, BPH and OA whom light of pt's risk factors and known unvascularized CAD,  who came to [facility] for recommended staged PCI to LAD. However, now with impella d/t complication    NEURO  AOx3     CARDIOVASCULAR  #IABP   d/t cardiogenic shock   Depending on _ ratio   - wean as tolerated   - pressors??    #3vCAD   DESx3 RCA  Home regimen: Aspirin 81 qD and Clopigrel 75 qD   GDMT: nebivolol 5mg PO (Beta Blocker) and losartan 100 qD  - Hold iso shock     #HTN  Home regimen: amlodipine 5 qD and losartan 100 qD  - Hold iso shock     #HLD   Home regimen: Rosuvastatin 10 mg qD = Atorvastatin 20 qD   - Hold iso shock    PULM/RESPIRATORY   #ADRIÁN   - on BiPAP at baseline    #Asthma  Home regimen: Symbicory 160 mg- 4.5 mcg x 2 puff qDay     GASTROINTESTINAL   Diet: NPO from procedure -> bedside dysphagia?    RENAL  Cr stable 0.97, SHANTEL    GENITOURINARY   #BPH  - zheng     ENDOCRINE  #DM-II   A1c 6.3, estimate average glucose 134  Home regimen: none   - mISS     HEMATOLOGIC   #Anemia   HgB slightly low 12.8, baseline last month 14.6     INFECTIOUS DISEASE  SHANTEL    MSK  #Debility   Post procedure rest for x hours   Then encourage OOB    DERMATOLOGY   SHANTEL    PROPHYLACTIC   F: none indicated    E: replete as needed; Keep K>4, Mg >2   D: DASH/TLC  DVT Proph: Lovenox  Dispo: ccu 79M, former smoker, w/ PMHx of HTN, HLD, DM-II, 3vCAD (DESx3 RCA, see below), ADRIÁN on CPAP, asthma, BPH and OA whom light of pt's risk factors and known unvascularized CAD,  who came to [facility] for recommended staged PCI to LAD. However, now with impella d/t complication    NEURO  AOx3     CARDIOVASCULAR  #IABP   d/t cardiogenic shock   Not on any pressors   With chest pain post procedure   - Heparin drip at 8pm (check PTT)  - wean as tolerated     #3vCAD   DESx3 RCA  Home regimen: Aspirin 81 qD and Clopigrel 75 qD   GDMT: nebivolol 5mg PO (Beta Blocker) and losartan 100 qD  - c/w DAPT    #HTN  Home regimen: amlodipine 5 qD and losartan 100 qD  - Hold iso shock     #HLD   Home regimen: Rosuvastatin 10 mg qD = Atorvastatin 20 qD   - Hold iso shock    PULM/RESPIRATORY   #ADRIÁN   - on BiPAP at baseline    #Asthma  Home regimen: Symbicory 160 mg- 4.5 mcg x 2 puff qDay     GASTROINTESTINAL   Diet: NPO from procedure   -Bedside dysphagia     RENAL  Cr stable 0.97, SHANTEL    GENITOURINARY   #BPH  - with condom cath currently   - transition to zheng      ENDOCRINE  #DM-II   A1c 6.3, estimate average glucose 134  Home regimen: none   - mISS     HEMATOLOGIC   #Anemia   HgB slightly low 12.8, baseline last month 14.6     INFECTIOUS DISEASE  SHANTEL    MSK  #Debility   Post procedure rest for x hours   Then encourage OOB    DERMATOLOGY   SHANTEL    PROPHYLACTIC   F: none indicated    E: replete as needed; Keep K>4, Mg >2   D: DASH/TLC  DVT Proph: DAPT   Dispo: ccu 79M, former smoker, w/ PMHx of HTN, HLD, DM-II, 3vCAD (DESx3 RCA, see below), ADRIÁN on CPAP, asthma, BPH and OA whom light of pt's risk factors and known unvascularized CAD,  who came to [facility] for recommended staged PCI to LAD. Now, with IABP placed to improve flow in the LAD.    NEURO  AOx3, baseline    CARDIOVASCULAR  #IABP   placed to improve flow in the LAD.  Not on any pressors   With chest pain post procedure   - Heparin drip at 8pm (check PTT)  - wean as tolerated     #3vCAD   DESx3 RCA  Home regimen: Aspirin 81 qD and Clopigrel 75 qD   GDMT: nebivolol 5mg PO (Beta Blocker) and losartan 100 qD  - c/w DAPT    #HTN  Home regimen: amlodipine 5 qD and losartan 100 qD  - Hold iso shock     #HLD   Home regimen: Rosuvastatin 10 mg qD = Atorvastatin 20 qD   - Hold iso shock    PULM/RESPIRATORY   #ADRIÁN   - on BiPAP at baseline    #Asthma  Home regimen: Symbicory 160 mg- 4.5 mcg x 2 puff qDay     GASTROINTESTINAL   Diet: NPO from procedure   -Bedside dysphagia     RENAL  Cr stable 0.97, SHANTEL    GENITOURINARY   #BPH  - with condom cath currently   - transition to zheng      ENDOCRINE  #DM-II   A1c 6.3, estimate average glucose 134  Home regimen: none   - mISS     HEMATOLOGIC   #Anemia   HgB slightly low 12.8, baseline last month 14.6     INFECTIOUS DISEASE  SHANTEL    MSK  #Debility   Post procedure rest for x hours   Then encourage OOB    DERMATOLOGY   SHANTEL    PROPHYLACTIC   F: none indicated    E: replete as needed; Keep K>4, Mg >2   D: DASH/TLC  DVT Proph: DAPT   Dispo: ccu

## 2024-10-22 ENCOUNTER — RESULT REVIEW (OUTPATIENT)
Age: 79
End: 2024-10-22

## 2024-10-22 LAB
ACANTHOCYTES BLD QL SMEAR: SLIGHT — SIGNIFICANT CHANGE UP
ALBUMIN SERPL ELPH-MCNC: 3.8 G/DL — SIGNIFICANT CHANGE UP (ref 3.3–5)
ALP SERPL-CCNC: 49 U/L — SIGNIFICANT CHANGE UP (ref 40–120)
ALT FLD-CCNC: 35 U/L — SIGNIFICANT CHANGE UP (ref 10–45)
ANION GAP SERPL CALC-SCNC: 10 MMOL/L — SIGNIFICANT CHANGE UP (ref 5–17)
ANISOCYTOSIS BLD QL: SLIGHT — SIGNIFICANT CHANGE UP
APTT BLD: 139.1 SEC — CRITICAL HIGH (ref 24.5–35.6)
APTT BLD: 83.5 SEC — HIGH (ref 24.5–35.6)
APTT BLD: 86.6 SEC — HIGH (ref 24.5–35.6)
AST SERPL-CCNC: 215 U/L — HIGH (ref 10–40)
BASOPHILS # BLD AUTO: 0.07 K/UL — SIGNIFICANT CHANGE UP (ref 0–0.2)
BASOPHILS NFR BLD AUTO: 0.9 % — SIGNIFICANT CHANGE UP (ref 0–2)
BILIRUB SERPL-MCNC: 0.7 MG/DL — SIGNIFICANT CHANGE UP (ref 0.2–1.2)
BUN SERPL-MCNC: 20 MG/DL — SIGNIFICANT CHANGE UP (ref 7–23)
CALCIUM SERPL-MCNC: 8.7 MG/DL — SIGNIFICANT CHANGE UP (ref 8.4–10.5)
CHLORIDE SERPL-SCNC: 107 MMOL/L — SIGNIFICANT CHANGE UP (ref 96–108)
CO2 SERPL-SCNC: 24 MMOL/L — SIGNIFICANT CHANGE UP (ref 22–31)
CREAT SERPL-MCNC: 0.94 MG/DL — SIGNIFICANT CHANGE UP (ref 0.5–1.3)
EGFR: 81 ML/MIN/1.73M2 — SIGNIFICANT CHANGE UP
ELLIPTOCYTES BLD QL SMEAR: SLIGHT — SIGNIFICANT CHANGE UP
EOSINOPHIL # BLD AUTO: 0 K/UL — SIGNIFICANT CHANGE UP (ref 0–0.5)
EOSINOPHIL NFR BLD AUTO: 0 % — SIGNIFICANT CHANGE UP (ref 0–6)
GLUCOSE BLDC GLUCOMTR-MCNC: 121 MG/DL — HIGH (ref 70–99)
GLUCOSE BLDC GLUCOMTR-MCNC: 134 MG/DL — HIGH (ref 70–99)
GLUCOSE BLDC GLUCOMTR-MCNC: 138 MG/DL — HIGH (ref 70–99)
GLUCOSE SERPL-MCNC: 151 MG/DL — HIGH (ref 70–99)
HCT VFR BLD CALC: 36.7 % — LOW (ref 39–50)
HGB BLD-MCNC: 11.6 G/DL — LOW (ref 13–17)
INR BLD: 1.15 — SIGNIFICANT CHANGE UP (ref 0.85–1.16)
INR BLD: 1.17 — HIGH (ref 0.85–1.16)
INR BLD: 1.2 — HIGH (ref 0.85–1.16)
LYMPHOCYTES # BLD AUTO: 0.44 K/UL — LOW (ref 1–3.3)
LYMPHOCYTES # BLD AUTO: 5.3 % — LOW (ref 13–44)
MACROCYTES BLD QL: SLIGHT — SIGNIFICANT CHANGE UP
MAGNESIUM SERPL-MCNC: 2.1 MG/DL — SIGNIFICANT CHANGE UP (ref 1.6–2.6)
MANUAL SMEAR VERIFICATION: SIGNIFICANT CHANGE UP
MCHC RBC-ENTMCNC: 25.4 PG — LOW (ref 27–34)
MCHC RBC-ENTMCNC: 31.6 GM/DL — LOW (ref 32–36)
MCV RBC AUTO: 80.3 FL — SIGNIFICANT CHANGE UP (ref 80–100)
MICROCYTES BLD QL: SLIGHT — SIGNIFICANT CHANGE UP
MONOCYTES # BLD AUTO: 0.5 K/UL — SIGNIFICANT CHANGE UP (ref 0–0.9)
MONOCYTES NFR BLD AUTO: 6.1 % — SIGNIFICANT CHANGE UP (ref 2–14)
NEUTROPHILS # BLD AUTO: 7.2 K/UL — SIGNIFICANT CHANGE UP (ref 1.8–7.4)
NEUTROPHILS NFR BLD AUTO: 87.7 % — HIGH (ref 43–77)
OVALOCYTES BLD QL SMEAR: SLIGHT — SIGNIFICANT CHANGE UP
PHOSPHATE SERPL-MCNC: 3.8 MG/DL — SIGNIFICANT CHANGE UP (ref 2.5–4.5)
PLAT MORPH BLD: NORMAL — SIGNIFICANT CHANGE UP
PLATELET # BLD AUTO: 118 K/UL — LOW (ref 150–400)
POIKILOCYTOSIS BLD QL AUTO: SIGNIFICANT CHANGE UP
POTASSIUM SERPL-MCNC: 4 MMOL/L — SIGNIFICANT CHANGE UP (ref 3.5–5.3)
POTASSIUM SERPL-SCNC: 4 MMOL/L — SIGNIFICANT CHANGE UP (ref 3.5–5.3)
PROT SERPL-MCNC: 6.4 G/DL — SIGNIFICANT CHANGE UP (ref 6–8.3)
PROTHROM AB SERPL-ACNC: 13.4 SEC — SIGNIFICANT CHANGE UP (ref 9.9–13.4)
PROTHROM AB SERPL-ACNC: 13.7 SEC — HIGH (ref 9.9–13.4)
PROTHROM AB SERPL-ACNC: 13.8 SEC — HIGH (ref 9.9–13.4)
RBC # BLD: 4.57 M/UL — SIGNIFICANT CHANGE UP (ref 4.2–5.8)
RBC # FLD: 13.5 % — SIGNIFICANT CHANGE UP (ref 10.3–14.5)
RBC BLD AUTO: ABNORMAL
SCHISTOCYTES BLD QL AUTO: SLIGHT — SIGNIFICANT CHANGE UP
SODIUM SERPL-SCNC: 141 MMOL/L — SIGNIFICANT CHANGE UP (ref 135–145)
TROPONIN T, HIGH SENSITIVITY RESULT: 3461 NG/L — CRITICAL HIGH (ref 0–51)
TROPONIN T, HIGH SENSITIVITY RESULT: 4224 NG/L — CRITICAL HIGH (ref 0–51)
WBC # BLD: 8.21 K/UL — SIGNIFICANT CHANGE UP (ref 3.8–10.5)
WBC # FLD AUTO: 8.21 K/UL — SIGNIFICANT CHANGE UP (ref 3.8–10.5)

## 2024-10-22 PROCEDURE — 71045 X-RAY EXAM CHEST 1 VIEW: CPT | Mod: 26

## 2024-10-22 PROCEDURE — 93306 TTE W/DOPPLER COMPLETE: CPT | Mod: 26

## 2024-10-22 PROCEDURE — 99291 CRITICAL CARE FIRST HOUR: CPT

## 2024-10-22 PROCEDURE — 93010 ELECTROCARDIOGRAM REPORT: CPT

## 2024-10-22 RX ORDER — HEPARIN SODIUM 10000 [USP'U]/ML
INJECTION INTRAVENOUS; SUBCUTANEOUS
Qty: 25000 | Refills: 0 | Status: DISCONTINUED | OUTPATIENT
Start: 2024-10-22 | End: 2024-10-22

## 2024-10-22 RX ORDER — SPIRONOLACTONE 100 MG
12.5 TABLET ORAL DAILY
Refills: 0 | Status: DISCONTINUED | OUTPATIENT
Start: 2024-10-22 | End: 2024-10-29

## 2024-10-22 RX ORDER — SODIUM CHLORIDE 9 MG/ML
250 INJECTION, SOLUTION INTRAMUSCULAR; INTRAVENOUS; SUBCUTANEOUS ONCE
Refills: 0 | Status: COMPLETED | OUTPATIENT
Start: 2024-10-22 | End: 2024-10-22

## 2024-10-22 RX ORDER — HEPARIN SODIUM 10000 [USP'U]/ML
900 INJECTION INTRAVENOUS; SUBCUTANEOUS
Qty: 25000 | Refills: 0 | Status: DISCONTINUED | OUTPATIENT
Start: 2024-10-22 | End: 2024-10-23

## 2024-10-22 RX ORDER — INSULIN LISPRO 100/ML
VIAL (ML) SUBCUTANEOUS EVERY 6 HOURS
Refills: 0 | Status: DISCONTINUED | OUTPATIENT
Start: 2024-10-22 | End: 2024-10-29

## 2024-10-22 RX ORDER — GLUCAGON INJECTION, SOLUTION 1 MG/.2ML
1 INJECTION, SOLUTION SUBCUTANEOUS ONCE
Refills: 0 | Status: DISCONTINUED | OUTPATIENT
Start: 2024-10-22 | End: 2024-10-29

## 2024-10-22 RX ADMIN — Medication 80 MILLIGRAM(S): at 21:37

## 2024-10-22 RX ADMIN — CLOPIDOGREL 75 MILLIGRAM(S): 75 TABLET ORAL at 12:00

## 2024-10-22 RX ADMIN — SODIUM CHLORIDE 250 MILLILITER(S): 9 INJECTION, SOLUTION INTRAMUSCULAR; INTRAVENOUS; SUBCUTANEOUS at 09:00

## 2024-10-22 RX ADMIN — Medication 12.5 MILLIGRAM(S): at 10:00

## 2024-10-22 RX ADMIN — Medication 81 MILLIGRAM(S): at 12:00

## 2024-10-22 RX ADMIN — HEPARIN SODIUM 1100 UNIT(S)/HR: 10000 INJECTION INTRAVENOUS; SUBCUTANEOUS at 01:26

## 2024-10-22 NOTE — PROGRESS NOTE ADULT - SUBJECTIVE AND OBJECTIVE BOX
Internal Medicine Progress Note  Layne Jaramillo, PGY-1    ******INCOMPLETE******    OVERNIGHT EVENTS/INTERVAL HPI:    OBJECTIVE:  Vital Signs Last 24 Hrs  T(C): 36.7 (22 Oct 2024 01:10), Max: 36.8 (21 Oct 2024 21:45)  T(F): 98 (22 Oct 2024 01:10), Max: 98.2 (21 Oct 2024 21:45)  HR: 61 (22 Oct 2024 05:00) (54 - 92)  BP: 110/82 (22 Oct 2024 05:00) (70/51 - 134/61)  BP(mean): 92 (22 Oct 2024 05:00) (56 - 97)  RR: 17 (22 Oct 2024 05:00) (16 - 34)  SpO2: 96% (22 Oct 2024 05:00) (88% - 98%)    Parameters below as of 22 Oct 2024 06:00  Patient On (Oxygen Delivery Method): room air      I&O's Detail    21 Oct 2024 07:01  -  22 Oct 2024 07:00  --------------------------------------------------------  IN:    Heparin Infusion: 66 mL    IV PiggyBack: 250 mL    Nitroglycerin: 21 mL    Oral Fluid: 200 mL    Sodium Chloride 0.9% Bolus: 500 mL  Total IN: 1037 mL    OUT:    Voided (mL): 2720 mL  Total OUT: 2720 mL    Total NET: -1683 mL        Physical Exam:  GENERAL: Awake, alert and interactive, no acute distress, appears comfortable  NEURO: A&Ox4, no focal deficits, 5/5 strength in all ext, reflexes 2+ throughout, CN 2-12 intact  HEENT: Normocephalic, atraumatic, no conjunctivitis or scleral icterus, oral mucosa moist, no oral lesions noted  NECK: Supple, no LAD, no JVD, thyroid not palpable  CARDIAC: Regular rate and rhythm, +S1/S2, no murmurs/rubs/gallops  PULM: Breathing comfortably on RA, clear to auscultation bilaterally, no wheezes/rales/rhonchi  ABDOMEN: Soft, nontender, nondistended, +bs, no hepatosplenomegaly, no rebound tenderness or fluid wave, no CVA tenderness  : Deferred  MSK: Range of motion grossly intact, no back tenderness  SKIN: Warm and dry, no rashes, lesions  VASC: Cap refil < 2 sec, 2+ peripheral pulses, no edema, no LE tenderness  Psych: Appropriate affect    Medications:  MEDICATIONS  (STANDING):  aspirin enteric coated 81 milliGRAM(s) Oral daily  atorvastatin 80 milliGRAM(s) Oral at bedtime  chlorhexidine 4% Liquid 1 Application(s) Topical once  clopidogrel Tablet 75 milliGRAM(s) Oral daily  heparin  Infusion.  Unit(s)/Hr (11 mL/Hr) IV Continuous <Continuous>  nitroglycerin  Infusion 10 MICROgram(s)/Min (3 mL/Hr) IV Continuous <Continuous>  sodium chloride 0.9%. 1000 milliLiter(s) (75 mL/Hr) IV Continuous <Continuous>    MEDICATIONS  (PRN):      Labs:                        11.6   8.21  )-----------( 118      ( 22 Oct 2024 05:30 )             36.7     10-22    141  |  107  |  20  ----------------------------<  151[H]  4.0   |  24  |  0.94    Ca    8.7      22 Oct 2024 05:30  Phos  3.8     10-22  Mg     2.1     10-22    TPro  6.4  /  Alb  3.8  /  TBili  0.7  /  DBili  x   /  AST  215[H]  /  ALT  35  /  AlkPhos  49  10-22    PT/INR - ( 22 Oct 2024 05:30 )   PT: 13.8 sec;   INR: 1.20          PTT - ( 22 Oct 2024 05:30 )  PTT:86.6 sec    Urinalysis Basic - ( 22 Oct 2024 05:30 )    Color: x / Appearance: x / SG: x / pH: x  Gluc: 151 mg/dL / Ketone: x  / Bili: x / Urobili: x   Blood: x / Protein: x / Nitrite: x   Leuk Esterase: x / RBC: x / WBC x   Sq Epi: x / Non Sq Epi: x / Bacteria: x          Radiology: Reviewed Internal Medicine Progress Note  Junito Romano PGY1     OVERNIGHT EVENTS/INTERVAL HPI:  CXR showing IABP tip by the aortic knob in normal position, Started patient on atorvastatin 80 mg qd , trop uptrended>> will wtrend with am labs, no chest pain, EKG unchanged, Startted Heparin gtt @ 11 ml/hr , am PTT within target. Feels well this AM with slight chest pain, but improved overall     OBJECTIVE:  Vital Signs Last 24 Hrs  T(C): 36.7 (22 Oct 2024 01:10), Max: 36.8 (21 Oct 2024 21:45)  T(F): 98 (22 Oct 2024 01:10), Max: 98.2 (21 Oct 2024 21:45)  HR: 61 (22 Oct 2024 05:00) (54 - 92)  BP: 110/82 (22 Oct 2024 05:00) (70/51 - 134/61)  BP(mean): 92 (22 Oct 2024 05:00) (56 - 97)  RR: 17 (22 Oct 2024 05:00) (16 - 34)  SpO2: 96% (22 Oct 2024 05:00) (88% - 98%)    Parameters below as of 22 Oct 2024 06:00  Patient On (Oxygen Delivery Method): room air      I&O's Detail    21 Oct 2024 07:01  -  22 Oct 2024 07:00  --------------------------------------------------------  IN:    Heparin Infusion: 66 mL    IV PiggyBack: 250 mL    Nitroglycerin: 21 mL    Oral Fluid: 200 mL    Sodium Chloride 0.9% Bolus: 500 mL  Total IN: 1037 mL    OUT:    Voided (mL): 2720 mL  Total OUT: 2720 mL    Total NET: -1683 mL        PHYSICAL   CONSTITUTIONAL: Well groomed, no apparent distress  EYES: PERRLA and symmetric, EOMI, No conjunctival or scleral injection, non-icteric  ENMT: Oral mucosa with moist membranes. Normal dentition; no pharyngeal injection or exudates             NECK: Supple, symmetric and without tracheal deviation   RESP: No respiratory distress, no use of accessory muscles; CTA b/l, no WRR  CV: RRR, Chest TTP, +S1S2, no MRG; no JVD; no peripheral edema  : w/ condom cath   GI: Soft, NT, ND, no rebound, no guarding; no palpable masses; no hepatosplenomegaly; no hernia palpated  NEURO: Moves all 4 extremities spontaneously   PSYCH: Appropriate insight/judgment; A+O x 3, mood and affect appropriate, recent/remote memory intact    Medications:  MEDICATIONS  (STANDING):  aspirin enteric coated 81 milliGRAM(s) Oral daily  atorvastatin 80 milliGRAM(s) Oral at bedtime  chlorhexidine 4% Liquid 1 Application(s) Topical once  clopidogrel Tablet 75 milliGRAM(s) Oral daily  heparin  Infusion.  Unit(s)/Hr (11 mL/Hr) IV Continuous <Continuous>  nitroglycerin  Infusion 10 MICROgram(s)/Min (3 mL/Hr) IV Continuous <Continuous>  sodium chloride 0.9%. 1000 milliLiter(s) (75 mL/Hr) IV Continuous <Continuous>    MEDICATIONS  (PRN):      Labs:                        11.6   8.21  )-----------( 118      ( 22 Oct 2024 05:30 )             36.7     10-22    141  |  107  |  20  ----------------------------<  151[H]  4.0   |  24  |  0.94    Ca    8.7      22 Oct 2024 05:30  Phos  3.8     10-22  Mg     2.1     10-22    TPro  6.4  /  Alb  3.8  /  TBili  0.7  /  DBili  x   /  AST  215[H]  /  ALT  35  /  AlkPhos  49  10-22    PT/INR - ( 22 Oct 2024 05:30 )   PT: 13.8 sec;   INR: 1.20          PTT - ( 22 Oct 2024 05:30 )  PTT:86.6 sec    Urinalysis Basic - ( 22 Oct 2024 05:30 )    Color: x / Appearance: x / SG: x / pH: x  Gluc: 151 mg/dL / Ketone: x  / Bili: x / Urobili: x   Blood: x / Protein: x / Nitrite: x   Leuk Esterase: x / RBC: x / WBC x   Sq Epi: x / Non Sq Epi: x / Bacteria: x          Radiology: Reviewed

## 2024-10-22 NOTE — PROGRESS NOTE ADULT - ASSESSMENT
79M, former smoker, w/ PMHx of HTN, HLD, DM-II, 3vCAD (DESx3 RCA, see below), ADRIÁN on CPAP, asthma, BPH and OA whom light of pt's risk factors and known unvascularized CAD,  who came to [facility] for recommended staged PCI to LAD. However, now with impella d/t complication    NEURO  AOx3     CARDIOVASCULAR  #IABP   d/t cardiogenic shock   Not on any pressors   With chest pain post procedure   - Heparin drip at 8pm (check PTT)  - wean as tolerated     #3vCAD   DESx3 RCA  Home regimen: Aspirin 81 qD and Clopigrel 75 qD   GDMT: nebivolol 5mg PO (Beta Blocker) and losartan 100 qD  - c/w DAPT    #HTN  Home regimen: amlodipine 5 qD and losartan 100 qD  - Hold iso shock     #HLD   Home regimen: Rosuvastatin 10 mg qD = Atorvastatin 20 qD   - Hold iso shock    PULM/RESPIRATORY   #ADRIÁN   - on BiPAP at baseline    #Asthma  Home regimen: Symbicory 160 mg- 4.5 mcg x 2 puff qDay     GASTROINTESTINAL   Diet: NPO from procedure   -Bedside dysphagia     RENAL  Cr stable 0.97, SHANTEL    GENITOURINARY   #BPH  - with condom cath currently   - transition to zheng      ENDOCRINE  #DM-II   A1c 6.3, estimate average glucose 134  Home regimen: none   - mISS     HEMATOLOGIC   #Anemia   HgB slightly low 12.8, baseline last month 14.6     INFECTIOUS DISEASE  SHANTEL    MSK  #Debility   Post procedure rest for x hours   Then encourage OOB    DERMATOLOGY   SHANTEL    PROPHYLACTIC   F: none indicated    E: replete as needed; Keep K>4, Mg >2   D: DASH/TLC  DVT Proph: DAPT  79M, former smoker, w/ PMHx of HTN, HLD, DM-II, 3vCAD (DESx3 RCA, see below), ADRIÁN on CPAP, asthma, BPH and OA whom light of pt's risk factors and known unvascularized CAD,  who came to [facility] for recommended staged PCI to LAD. Now, with IABP placed to improve flow in the LAD.    NEURO  AOx3     CARDIOVASCULAR  #IABP s/p 10/21 insertion  d/t cardiogenic shock   Not on any pressors   With chest pain post procedure, improving   - Heparin drip at 8pm (check PTT)  - wean as tolerated   - trend trops   - f/up TTE   -  cc bolus x 1     #3vCAD   DESx3 RCA  Home regimen: Aspirin 81 qD and Clopigrel 75 qD   GDMT: nebivolol 5mg PO (Beta Blocker) and losartan 100 qD  - c/w DAPT    #HTN  Home regimen: amlodipine 5 qD and losartan 100 qD  - Hold iso shock   - Start aldactone 12.5     #HLD   Home regimen: Rosuvastatin 10 mg qD = Atorvastatin 20 qD   - Hold iso shock    PULM/RESPIRATORY   #ADRIÁN   - on BiPAP at baseline    #Asthma  Home regimen: Symbicory 160 mg- 4.5 mcg x 2 puff qDay     GASTROINTESTINAL   Diet: NPO from procedure   -Bedside dysphagia     RENAL  Cr stable 0.97, SHANTEL    GENITOURINARY   #BPH  - with condom cath currently   - transition to zheng      ENDOCRINE  #DM-II   A1c 6.3, estimate average glucose 134  Home regimen: none   - mISS     HEMATOLOGIC   #Anemia   HgB slightly low 12.8, baseline last month 14.6     INFECTIOUS DISEASE  SHANTEL    MSK  #Debility   Post procedure rest for x hours   Then encourage OOB    DERMATOLOGY   SHANTEL    PROPHYLACTIC   F: none indicated    E: replete as needed; Keep K>4, Mg >2   D: DASH/TLC  DVT Proph: DAPT  79M, former smoker, w/ PMHx of HTN, HLD, DM-II, 3vCAD (DESx3 RCA, see below), ADRIÁN on CPAP, asthma, BPH and OA whom light of pt's risk factors and known unvascularized CAD,  who came to [facility] for recommended staged PCI to LAD. Now, with IABP placed to improve flow in the LAD.    NEURO  AOx3     CARDIOVASCULAR  #IABP s/p 10/21 insertion  d/t cardiogenic shock   Not on any pressors   With chest pain post procedure, improving   - Heparin drip at 8pm (check PTT)  - wean as tolerated   - trend trops   - f/up TTE   -  cc bolus x 1     #3vCAD   DESx3 RCA  Home regimen: Aspirin 81 qD and Clopigrel 75 qD   GDMT: nebivolol 5mg PO (Beta Blocker) and losartan 100 qD  - c/w DAPT    #HTN  Home regimen: amlodipine 5 qD and losartan 100 qD  - Hold iso shock   - Start aldactone 12.5     #HLD   Home regimen: Rosuvastatin 10 mg qD = Atorvastatin 20 qD   - Hold iso shock    PULM/RESPIRATORY   #ADRIÁN   - on BiPAP at baseline    #Asthma  Home regimen: Symbicory 160 mg- 4.5 mcg x 2 puff qDay     GASTROINTESTINAL   Diet: NPO from procedure   -Bedside dysphagia     RENAL  Cr stable, SHANTEL    GENITOURINARY   #BPH  - with condom cath currently   - transition to zheng      ENDOCRINE  #DM-II   A1c 6.3, estimate average glucose 134  Home regimen: none   - mISS     HEMATOLOGIC   #Anemia   HgB slightly low 12.8, baseline last month 14.6     INFECTIOUS DISEASE  SHANTEL    MSK  #Debility   Post procedure rest for x hours   Then encourage OOB    DERMATOLOGY   SHANTEL    PROPHYLACTIC   F: none indicated    E: replete as needed; Keep K>4, Mg >2   D: DASH/TLC  DVT Proph: DAPT   Code status: FULL

## 2024-10-23 LAB
ADD ON TEST-SPECIMEN IN LAB: SIGNIFICANT CHANGE UP
ADD ON TEST-SPECIMEN IN LAB: SIGNIFICANT CHANGE UP
ALBUMIN SERPL ELPH-MCNC: 3.5 G/DL — SIGNIFICANT CHANGE UP (ref 3.3–5)
ALP SERPL-CCNC: 45 U/L — SIGNIFICANT CHANGE UP (ref 40–120)
ALT FLD-CCNC: 30 U/L — SIGNIFICANT CHANGE UP (ref 10–45)
ANION GAP SERPL CALC-SCNC: 9 MMOL/L — SIGNIFICANT CHANGE UP (ref 5–17)
APTT BLD: 68.9 SEC — HIGH (ref 24.5–35.6)
APTT BLD: 71 SEC — HIGH (ref 24.5–35.6)
APTT BLD: 74.2 SEC — HIGH (ref 24.5–35.6)
AST SERPL-CCNC: 139 U/L — HIGH (ref 10–40)
BASOPHILS # BLD AUTO: 0.02 K/UL — SIGNIFICANT CHANGE UP (ref 0–0.2)
BASOPHILS # BLD AUTO: 0.02 K/UL — SIGNIFICANT CHANGE UP (ref 0–0.2)
BASOPHILS NFR BLD AUTO: 0.2 % — SIGNIFICANT CHANGE UP (ref 0–2)
BASOPHILS NFR BLD AUTO: 0.2 % — SIGNIFICANT CHANGE UP (ref 0–2)
BILIRUB SERPL-MCNC: 0.6 MG/DL — SIGNIFICANT CHANGE UP (ref 0.2–1.2)
BUN SERPL-MCNC: 17 MG/DL — SIGNIFICANT CHANGE UP (ref 7–23)
CALCIUM SERPL-MCNC: 8.4 MG/DL — SIGNIFICANT CHANGE UP (ref 8.4–10.5)
CHLORIDE SERPL-SCNC: 108 MMOL/L — SIGNIFICANT CHANGE UP (ref 96–108)
CO2 SERPL-SCNC: 24 MMOL/L — SIGNIFICANT CHANGE UP (ref 22–31)
CREAT SERPL-MCNC: 0.87 MG/DL — SIGNIFICANT CHANGE UP (ref 0.5–1.3)
EGFR: 87 ML/MIN/1.73M2 — SIGNIFICANT CHANGE UP
EOSINOPHIL # BLD AUTO: 0.03 K/UL — SIGNIFICANT CHANGE UP (ref 0–0.5)
EOSINOPHIL # BLD AUTO: 0.04 K/UL — SIGNIFICANT CHANGE UP (ref 0–0.5)
EOSINOPHIL NFR BLD AUTO: 0.4 % — SIGNIFICANT CHANGE UP (ref 0–6)
EOSINOPHIL NFR BLD AUTO: 0.5 % — SIGNIFICANT CHANGE UP (ref 0–6)
GLUCOSE BLDC GLUCOMTR-MCNC: 112 MG/DL — HIGH (ref 70–99)
GLUCOSE BLDC GLUCOMTR-MCNC: 99 MG/DL — SIGNIFICANT CHANGE UP (ref 70–99)
GLUCOSE BLDC GLUCOMTR-MCNC: 99 MG/DL — SIGNIFICANT CHANGE UP (ref 70–99)
GLUCOSE SERPL-MCNC: 105 MG/DL — HIGH (ref 70–99)
HCT VFR BLD CALC: 35.4 % — LOW (ref 39–50)
HCT VFR BLD CALC: 36.8 % — LOW (ref 39–50)
HCT VFR BLD CALC: 38 % — LOW (ref 39–50)
HGB BLD-MCNC: 11.5 G/DL — LOW (ref 13–17)
HGB BLD-MCNC: 11.7 G/DL — LOW (ref 13–17)
HGB BLD-MCNC: 12.2 G/DL — LOW (ref 13–17)
IMM GRANULOCYTES NFR BLD AUTO: 0.2 % — SIGNIFICANT CHANGE UP (ref 0–0.9)
IMM GRANULOCYTES NFR BLD AUTO: 0.4 % — SIGNIFICANT CHANGE UP (ref 0–0.9)
INR BLD: 1.16 — SIGNIFICANT CHANGE UP (ref 0.85–1.16)
LYMPHOCYTES # BLD AUTO: 0.84 K/UL — LOW (ref 1–3.3)
LYMPHOCYTES # BLD AUTO: 1.18 K/UL — SIGNIFICANT CHANGE UP (ref 1–3.3)
LYMPHOCYTES # BLD AUTO: 10.4 % — LOW (ref 13–44)
LYMPHOCYTES # BLD AUTO: 14.6 % — SIGNIFICANT CHANGE UP (ref 13–44)
MAGNESIUM SERPL-MCNC: 1.8 MG/DL — SIGNIFICANT CHANGE UP (ref 1.6–2.6)
MCHC RBC-ENTMCNC: 25.6 PG — LOW (ref 27–34)
MCHC RBC-ENTMCNC: 26.1 PG — LOW (ref 27–34)
MCHC RBC-ENTMCNC: 26.6 PG — LOW (ref 27–34)
MCHC RBC-ENTMCNC: 31.8 GM/DL — LOW (ref 32–36)
MCHC RBC-ENTMCNC: 32.1 GM/DL — SIGNIFICANT CHANGE UP (ref 32–36)
MCHC RBC-ENTMCNC: 32.5 GM/DL — SIGNIFICANT CHANGE UP (ref 32–36)
MCV RBC AUTO: 79.7 FL — LOW (ref 80–100)
MCV RBC AUTO: 81.8 FL — SIGNIFICANT CHANGE UP (ref 80–100)
MCV RBC AUTO: 82 FL — SIGNIFICANT CHANGE UP (ref 80–100)
MONOCYTES # BLD AUTO: 0.84 K/UL — SIGNIFICANT CHANGE UP (ref 0–0.9)
MONOCYTES # BLD AUTO: 1.02 K/UL — HIGH (ref 0–0.9)
MONOCYTES NFR BLD AUTO: 10.4 % — SIGNIFICANT CHANGE UP (ref 2–14)
MONOCYTES NFR BLD AUTO: 12.6 % — SIGNIFICANT CHANGE UP (ref 2–14)
NEUTROPHILS # BLD AUTO: 5.79 K/UL — SIGNIFICANT CHANGE UP (ref 1.8–7.4)
NEUTROPHILS # BLD AUTO: 6.36 K/UL — SIGNIFICANT CHANGE UP (ref 1.8–7.4)
NEUTROPHILS NFR BLD AUTO: 71.7 % — SIGNIFICANT CHANGE UP (ref 43–77)
NEUTROPHILS NFR BLD AUTO: 78.4 % — HIGH (ref 43–77)
NRBC # BLD: 0 /100 WBCS — SIGNIFICANT CHANGE UP (ref 0–0)
PHOSPHATE SERPL-MCNC: 2.5 MG/DL — SIGNIFICANT CHANGE UP (ref 2.5–4.5)
PLATELET # BLD AUTO: 84 K/UL — LOW (ref 150–400)
PLATELET # BLD AUTO: 86 K/UL — LOW (ref 150–400)
PLATELET # BLD AUTO: 92 K/UL — LOW (ref 150–400)
POTASSIUM SERPL-MCNC: 3.7 MMOL/L — SIGNIFICANT CHANGE UP (ref 3.5–5.3)
POTASSIUM SERPL-SCNC: 3.7 MMOL/L — SIGNIFICANT CHANGE UP (ref 3.5–5.3)
PROT SERPL-MCNC: 6 G/DL — SIGNIFICANT CHANGE UP (ref 6–8.3)
PROTHROM AB SERPL-ACNC: 13.5 SEC — HIGH (ref 9.9–13.4)
RBC # BLD: 4.33 M/UL — SIGNIFICANT CHANGE UP (ref 4.2–5.8)
RBC # BLD: 4.49 M/UL — SIGNIFICANT CHANGE UP (ref 4.2–5.8)
RBC # BLD: 4.77 M/UL — SIGNIFICANT CHANGE UP (ref 4.2–5.8)
RBC # FLD: 13.5 % — SIGNIFICANT CHANGE UP (ref 10.3–14.5)
RBC # FLD: 13.7 % — SIGNIFICANT CHANGE UP (ref 10.3–14.5)
RBC # FLD: 13.7 % — SIGNIFICANT CHANGE UP (ref 10.3–14.5)
SODIUM SERPL-SCNC: 141 MMOL/L — SIGNIFICANT CHANGE UP (ref 135–145)
TROPONIN T, HIGH SENSITIVITY RESULT: 2341 NG/L — CRITICAL HIGH (ref 0–51)
WBC # BLD: 7.98 K/UL — SIGNIFICANT CHANGE UP (ref 3.8–10.5)
WBC # BLD: 8.08 K/UL — SIGNIFICANT CHANGE UP (ref 3.8–10.5)
WBC # BLD: 8.11 K/UL — SIGNIFICANT CHANGE UP (ref 3.8–10.5)
WBC # FLD AUTO: 7.98 K/UL — SIGNIFICANT CHANGE UP (ref 3.8–10.5)
WBC # FLD AUTO: 8.08 K/UL — SIGNIFICANT CHANGE UP (ref 3.8–10.5)
WBC # FLD AUTO: 8.11 K/UL — SIGNIFICANT CHANGE UP (ref 3.8–10.5)

## 2024-10-23 PROCEDURE — 93010 ELECTROCARDIOGRAM REPORT: CPT

## 2024-10-23 PROCEDURE — 71045 X-RAY EXAM CHEST 1 VIEW: CPT | Mod: 26

## 2024-10-23 PROCEDURE — 99291 CRITICAL CARE FIRST HOUR: CPT

## 2024-10-23 RX ORDER — MAGNESIUM SULFATE IN 0.9% NACL 2 G/50 ML
2 INTRAVENOUS SOLUTION, PIGGYBACK (ML) INTRAVENOUS ONCE
Refills: 0 | Status: COMPLETED | OUTPATIENT
Start: 2024-10-23 | End: 2024-10-23

## 2024-10-23 RX ORDER — POTASSIUM CHLORIDE 10 MEQ
40 TABLET, EXTENDED RELEASE ORAL ONCE
Refills: 0 | Status: COMPLETED | OUTPATIENT
Start: 2024-10-23 | End: 2024-10-23

## 2024-10-23 RX ORDER — FENTANYL CITRAT/DEXTROSE 5%/PF 1250MCG/50
50 PATIENT CONTROLLED ANALGESIA SYRINGE INTRAVENOUS ONCE
Refills: 0 | Status: DISCONTINUED | OUTPATIENT
Start: 2024-10-23 | End: 2024-10-23

## 2024-10-23 RX ORDER — LIDOCAINE HYDROCHLORIDE 40 MG/ML
1 SOLUTION TOPICAL ONCE
Refills: 0 | Status: COMPLETED | OUTPATIENT
Start: 2024-10-23 | End: 2024-10-23

## 2024-10-23 RX ORDER — NEBIVOLOL 10 MG/1
2.5 TABLET ORAL DAILY
Refills: 0 | Status: DISCONTINUED | OUTPATIENT
Start: 2024-10-23 | End: 2024-10-25

## 2024-10-23 RX ORDER — CHLORHEXIDINE GLUCONATE 40 MG/ML
1 SOLUTION TOPICAL
Refills: 0 | Status: DISCONTINUED | OUTPATIENT
Start: 2024-10-23 | End: 2024-10-29

## 2024-10-23 RX ADMIN — Medication 40 MILLIEQUIVALENT(S): at 07:40

## 2024-10-23 RX ADMIN — Medication 81 MILLIGRAM(S): at 12:57

## 2024-10-23 RX ADMIN — Medication 25 GRAM(S): at 07:40

## 2024-10-23 RX ADMIN — LIDOCAINE HYDROCHLORIDE 1 PATCH: 40 SOLUTION TOPICAL at 00:44

## 2024-10-23 RX ADMIN — NEBIVOLOL 2.5 MILLIGRAM(S): 10 TABLET ORAL at 15:06

## 2024-10-23 RX ADMIN — HEPARIN SODIUM 900 UNIT(S)/HR: 10000 INJECTION INTRAVENOUS; SUBCUTANEOUS at 03:55

## 2024-10-23 RX ADMIN — Medication 50 MICROGRAM(S): at 12:56

## 2024-10-23 RX ADMIN — Medication 12.5 MILLIGRAM(S): at 05:52

## 2024-10-23 RX ADMIN — Medication 80 MILLIGRAM(S): at 21:25

## 2024-10-23 RX ADMIN — LIDOCAINE HYDROCHLORIDE 1 PATCH: 40 SOLUTION TOPICAL at 12:58

## 2024-10-23 RX ADMIN — CLOPIDOGREL 75 MILLIGRAM(S): 75 TABLET ORAL at 12:57

## 2024-10-23 RX ADMIN — Medication 50 MICROGRAM(S): at 13:11

## 2024-10-23 RX ADMIN — LIDOCAINE HYDROCHLORIDE 1 PATCH: 40 SOLUTION TOPICAL at 06:37

## 2024-10-23 NOTE — PROGRESS NOTE ADULT - ASSESSMENT
78 yo man with MVCAD s/p successful PCI of RCA came for rota-assisted PCI of LAD. Procedure was complicated by no-reflow with final ADINA 1 flow IABP was placed to aid in coronary perfusion - hemodynamically was stable throughout   Transferred to CICU for ongoing car    NEURO  AOx3     CARDIOVASCULAR  #IABP s/p 10/21 insertion  - c/w Heparin drip  - trend trops - downtrending   - Plan to remove balloon pump today     #3vCAD   DESx3 RCA  Home regimen: Aspirin 81 qD and Clopigrel 75 qD   GDMT: nebivolol 5mg PO (Beta Blocker) and losartan 100 qD  - c/w DAPT  - Start nebivolol 2.5 mg PO q24     #HTN  Home regimen: amlodipine 5 qD and losartan 100 qD  - c/w aldactone 12.5     #HLD   Home regimen: Rosuvastatin   - c/w atorvastatin    PULM/RESPIRATORY   #ADRIÁN   - on BiPAP at baseline    #Asthma  Home regimen: Symbicory 160 mg- 4.5 mcg x 2 puff qDay     GASTROINTESTINAL   Diet: DASH/TLC diet     RENAL  Cr stable, SHANTEL    GENITOURINARY   #BPH  - with zheng      ENDOCRINE  #DM-II   A1c 6.3, estimate average glucose 134  Home regimen: none   - mISS - has not had any insulin needs at this time     HEMATOLOGIC   #Anemia   HgB slightly low 12.8, baseline last month 14.6     INFECTIOUS DISEASE  SHANTEL    MSK  SHANTEL     DERMATOLOGY   SHANTEL    PROPHYLACTIC   F: none indicated    E: replete as needed; Keep K>4, Mg >2   D: DASH/TLC  DVT Proph: DAPT   Code status: FULL

## 2024-10-23 NOTE — PROGRESS NOTE ADULT - SUBJECTIVE AND OBJECTIVE BOX
Internal Medicine Progress Note  Layne Jaramillo, PGY-1    ******INCOMPLETE******    OVERNIGHT EVENTS/INTERVAL HPI:    OBJECTIVE:  Vital Signs Last 24 Hrs  T(C): 36.9 (23 Oct 2024 05:00), Max: 36.9 (23 Oct 2024 05:00)  T(F): 98.4 (23 Oct 2024 05:00), Max: 98.4 (23 Oct 2024 05:00)  HR: 68 (23 Oct 2024 08:00) (62 - 70)  BP: 123/73 (23 Oct 2024 08:00) (92/59 - 135/60)  BP(mean): 90 (23 Oct 2024 08:00) (70 - 103)  RR: 21 (23 Oct 2024 08:00) (16 - 33)  SpO2: 98% (23 Oct 2024 08:00) (93% - 98%)    Parameters below as of 23 Oct 2024 08:00  Patient On (Oxygen Delivery Method): room air      I&O's Detail    22 Oct 2024 07:01  -  23 Oct 2024 07:00  --------------------------------------------------------  IN:    Heparin Infusion: 44 mL    Heparin Infusion: 162 mL    Oral Fluid: 474 mL    Sodium Chloride 0.9% Bolus: 250 mL  Total IN: 930 mL    OUT:    Voided (mL): 1125 mL  Total OUT: 1125 mL    Total NET: -195 mL      23 Oct 2024 07:01  -  23 Oct 2024 09:04  --------------------------------------------------------  IN:    Heparin Infusion: 9 mL    IV PiggyBack: 50 mL  Total IN: 59 mL    OUT:    Voided (mL): 20 mL  Total OUT: 20 mL    Total NET: 39 mL        Physical Exam:  GENERAL: Awake, alert and interactive, no acute distress, appears comfortable  NEURO: A&Ox4, no focal deficits, 5/5 strength in all ext, reflexes 2+ throughout, CN 2-12 intact  HEENT: Normocephalic, atraumatic, no conjunctivitis or scleral icterus, oral mucosa moist, no oral lesions noted  NECK: Supple, no LAD, no JVD, thyroid not palpable  CARDIAC: Regular rate and rhythm, +S1/S2, no murmurs/rubs/gallops  PULM: Breathing comfortably on RA, clear to auscultation bilaterally, no wheezes/rales/rhonchi  ABDOMEN: Soft, nontender, nondistended, +bs, no hepatosplenomegaly, no rebound tenderness or fluid wave, no CVA tenderness  : Deferred  MSK: Range of motion grossly intact, no back tenderness  SKIN: Warm and dry, no rashes, lesions  VASC: Cap refil < 2 sec, 2+ peripheral pulses, no edema, no LE tenderness  Psych: Appropriate affect    Medications:  MEDICATIONS  (STANDING):  aspirin enteric coated 81 milliGRAM(s) Oral daily  atorvastatin 80 milliGRAM(s) Oral at bedtime  chlorhexidine 2% Cloths 1 Application(s) Topical <User Schedule>  clopidogrel Tablet 75 milliGRAM(s) Oral daily  dextrose 5%. 1000 milliLiter(s) (100 mL/Hr) IV Continuous <Continuous>  dextrose 5%. 1000 milliLiter(s) (50 mL/Hr) IV Continuous <Continuous>  dextrose 50% Injectable 25 Gram(s) IV Push once  dextrose 50% Injectable 12.5 Gram(s) IV Push once  dextrose 50% Injectable 25 Gram(s) IV Push once  glucagon  Injectable 1 milliGRAM(s) IntraMuscular once  heparin  Infusion. 900 Unit(s)/Hr (9 mL/Hr) IV Continuous <Continuous>  insulin lispro (ADMELOG) corrective regimen sliding scale   SubCutaneous Before meals and at bedtime  nebivolol 2.5 milliGRAM(s) Oral daily  spironolactone 12.5 milliGRAM(s) Oral daily    MEDICATIONS  (PRN):  dextrose Oral Gel 15 Gram(s) Oral once PRN Blood Glucose LESS THAN 70 milliGRAM(s)/deciliter      Labs:                        11.5   8.08  )-----------( 92       ( 23 Oct 2024 05:30 )             35.4     10-23    141  |  108  |  17  ----------------------------<  105[H]  3.7   |  24  |  0.87    Ca    8.4      23 Oct 2024 05:30  Phos  2.5     10-23  Mg     1.8     10-23    TPro  6.0  /  Alb  3.5  /  TBili  0.6  /  DBili  x   /  AST  139[H]  /  ALT  30  /  AlkPhos  45  10-23    PT/INR - ( 23 Oct 2024 05:47 )   PT: 13.5 sec;   INR: 1.16          PTT - ( 23 Oct 2024 05:47 )  PTT:71.0 sec    Urinalysis Basic - ( 23 Oct 2024 05:30 )    Color: x / Appearance: x / SG: x / pH: x  Gluc: 105 mg/dL / Ketone: x  / Bili: x / Urobili: x   Blood: x / Protein: x / Nitrite: x   Leuk Esterase: x / RBC: x / WBC x   Sq Epi: x / Non Sq Epi: x / Bacteria: x          Radiology: Reviewed Internal Medicine Progress Note  Junito Romano PGY1     OVERNIGHT EVENTS/INTERVAL HPI: With rib pain overnight that was reproducible and resolved with a lidocaine patch. PTT therapeutic x 3. Repleted K (3.7) with 4- mEq PO and Mg 1.8 with 2g IV. Overall doing well this morning, resting comfortably in bed.     OBJECTIVE:  Vital Signs Last 24 Hrs  T(C): 36.9 (23 Oct 2024 05:00), Max: 36.9 (23 Oct 2024 05:00)  T(F): 98.4 (23 Oct 2024 05:00), Max: 98.4 (23 Oct 2024 05:00)  HR: 68 (23 Oct 2024 08:00) (62 - 70)  BP: 123/73 (23 Oct 2024 08:00) (92/59 - 135/60)  BP(mean): 90 (23 Oct 2024 08:00) (70 - 103)  RR: 21 (23 Oct 2024 08:00) (16 - 33)  SpO2: 98% (23 Oct 2024 08:00) (93% - 98%)    Parameters below as of 23 Oct 2024 08:00  Patient On (Oxygen Delivery Method): room air      I&O's Detail    22 Oct 2024 07:01  -  23 Oct 2024 07:00  --------------------------------------------------------  IN:    Heparin Infusion: 44 mL    Heparin Infusion: 162 mL    Oral Fluid: 474 mL    Sodium Chloride 0.9% Bolus: 250 mL  Total IN: 930 mL    OUT:    Voided (mL): 1125 mL  Total OUT: 1125 mL    Total NET: -195 mL      23 Oct 2024 07:01  -  23 Oct 2024 09:04  --------------------------------------------------------  IN:    Heparin Infusion: 9 mL    IV PiggyBack: 50 mL  Total IN: 59 mL    OUT:    Voided (mL): 20 mL  Total OUT: 20 mL    Total NET: 39 mL      PHYSICAL   CONSTITUTIONAL: Well groomed, no apparent distress  EYES: PERRLA and symmetric, EOMI, No conjunctival or scleral injection, non-icteric  ENMT: Oral mucosa with moist membranes. Normal dentition; no pharyngeal injection or exudates  NECK: Supple, symmetric and without tracheal deviation   RESP: No respiratory distress, no use of accessory muscles; CTA b/l, no WRR  CV: RRR, Chest very TTP, +S1S2, no MRG; no JVD; no peripheral edema  : w/ condom cath   GI: Soft, NT, ND, no rebound, no guarding; no palpable masses; no hepatosplenomegaly; no hernia palpated  NEURO: Moves all 4 extremities spontaneously   PSYCH: Appropriate insight/judgment; A+O x 3, mood and affect appropriate, recent/remote memory intact    Medications:  MEDICATIONS  (STANDING):  aspirin enteric coated 81 milliGRAM(s) Oral daily  atorvastatin 80 milliGRAM(s) Oral at bedtime  chlorhexidine 2% Cloths 1 Application(s) Topical <User Schedule>  clopidogrel Tablet 75 milliGRAM(s) Oral daily  dextrose 5%. 1000 milliLiter(s) (100 mL/Hr) IV Continuous <Continuous>  dextrose 5%. 1000 milliLiter(s) (50 mL/Hr) IV Continuous <Continuous>  dextrose 50% Injectable 25 Gram(s) IV Push once  dextrose 50% Injectable 12.5 Gram(s) IV Push once  dextrose 50% Injectable 25 Gram(s) IV Push once  glucagon  Injectable 1 milliGRAM(s) IntraMuscular once  heparin  Infusion. 900 Unit(s)/Hr (9 mL/Hr) IV Continuous <Continuous>  insulin lispro (ADMELOG) corrective regimen sliding scale   SubCutaneous Before meals and at bedtime  nebivolol 2.5 milliGRAM(s) Oral daily  spironolactone 12.5 milliGRAM(s) Oral daily    MEDICATIONS  (PRN):  dextrose Oral Gel 15 Gram(s) Oral once PRN Blood Glucose LESS THAN 70 milliGRAM(s)/deciliter      Labs:                        11.5   8.08  )-----------( 92       ( 23 Oct 2024 05:30 )             35.4     10-23    141  |  108  |  17  ----------------------------<  105[H]  3.7   |  24  |  0.87    Ca    8.4      23 Oct 2024 05:30  Phos  2.5     10-23  Mg     1.8     10-23    TPro  6.0  /  Alb  3.5  /  TBili  0.6  /  DBili  x   /  AST  139[H]  /  ALT  30  /  AlkPhos  45  10-23    PT/INR - ( 23 Oct 2024 05:47 )   PT: 13.5 sec;   INR: 1.16          PTT - ( 23 Oct 2024 05:47 )  PTT:71.0 sec    Urinalysis Basic - ( 23 Oct 2024 05:30 )    Color: x / Appearance: x / SG: x / pH: x  Gluc: 105 mg/dL / Ketone: x  / Bili: x / Urobili: x   Blood: x / Protein: x / Nitrite: x   Leuk Esterase: x / RBC: x / WBC x   Sq Epi: x / Non Sq Epi: x / Bacteria: x          Radiology: Reviewed Internal Medicine Progress Note  Junito Romano PGY1     ===============HOSPITAL COURSE======================  This 79-year-old male with multivessel coronary artery disease (MVCAD), status-post successful percutaneous coronary intervention (PCI) to the right coronary artery (RCA), presented for a rotational atherectomy-assisted PCI of the left anterior descending artery (LAD). The procedure was complicated by no-reflow phenomenon with a final Thrombolysis in Myocardial Infarction (ADINA) flow grade of 1. An intra-aortic balloon pump (IABP) was placed for hemodynamic support, which he tolerated well. He was transferred to the cardiac intensive care unit (CICU) where his troponins downtrended and his IABP was planned for removal on hospital day two. He was continued on dual antiplatelet therapy (DAPT), and his home medications for hypertension, hyperlipidemia, asthma, and benign prostatic hyperplasia were resumed. His diabetes remained well-controlled without insulin. He was noted to have mild anemia.    OVERNIGHT EVENTS/INTERVAL HPI: With rib pain overnight that was reproducible and resolved with a lidocaine patch. PTT therapeutic x 3. Repleted K (3.7) with 4- mEq PO and Mg 1.8 with 2g IV. Overall doing well this morning, resting comfortably in bed.     OBJECTIVE:  Vital Signs Last 24 Hrs  T(C): 36.9 (23 Oct 2024 05:00), Max: 36.9 (23 Oct 2024 05:00)  T(F): 98.4 (23 Oct 2024 05:00), Max: 98.4 (23 Oct 2024 05:00)  HR: 68 (23 Oct 2024 08:00) (62 - 70)  BP: 123/73 (23 Oct 2024 08:00) (92/59 - 135/60)  BP(mean): 90 (23 Oct 2024 08:00) (70 - 103)  RR: 21 (23 Oct 2024 08:00) (16 - 33)  SpO2: 98% (23 Oct 2024 08:00) (93% - 98%)    Parameters below as of 23 Oct 2024 08:00  Patient On (Oxygen Delivery Method): room air      I&O's Detail    22 Oct 2024 07:01  -  23 Oct 2024 07:00  --------------------------------------------------------  IN:    Heparin Infusion: 44 mL    Heparin Infusion: 162 mL    Oral Fluid: 474 mL    Sodium Chloride 0.9% Bolus: 250 mL  Total IN: 930 mL    OUT:    Voided (mL): 1125 mL  Total OUT: 1125 mL    Total NET: -195 mL      23 Oct 2024 07:01  -  23 Oct 2024 09:04  --------------------------------------------------------  IN:    Heparin Infusion: 9 mL    IV PiggyBack: 50 mL  Total IN: 59 mL    OUT:    Voided (mL): 20 mL  Total OUT: 20 mL    Total NET: 39 mL      PHYSICAL   CONSTITUTIONAL: Well groomed, no apparent distress  EYES: PERRLA and symmetric, EOMI, No conjunctival or scleral injection, non-icteric  ENMT: Oral mucosa with moist membranes. Normal dentition; no pharyngeal injection or exudates  NECK: Supple, symmetric and without tracheal deviation   RESP: No respiratory distress, no use of accessory muscles; CTA b/l, no WRR  CV: RRR, Chest very TTP, +S1S2, no MRG; no JVD; no peripheral edema  : w/ condom cath   GI: Soft, NT, ND, no rebound, no guarding; no palpable masses; no hepatosplenomegaly; no hernia palpated  NEURO: Moves all 4 extremities spontaneously   PSYCH: Appropriate insight/judgment; A+O x 3, mood and affect appropriate, recent/remote memory intact    Medications:  MEDICATIONS  (STANDING):  aspirin enteric coated 81 milliGRAM(s) Oral daily  atorvastatin 80 milliGRAM(s) Oral at bedtime  chlorhexidine 2% Cloths 1 Application(s) Topical <User Schedule>  clopidogrel Tablet 75 milliGRAM(s) Oral daily  dextrose 5%. 1000 milliLiter(s) (100 mL/Hr) IV Continuous <Continuous>  dextrose 5%. 1000 milliLiter(s) (50 mL/Hr) IV Continuous <Continuous>  dextrose 50% Injectable 25 Gram(s) IV Push once  dextrose 50% Injectable 12.5 Gram(s) IV Push once  dextrose 50% Injectable 25 Gram(s) IV Push once  glucagon  Injectable 1 milliGRAM(s) IntraMuscular once  heparin  Infusion. 900 Unit(s)/Hr (9 mL/Hr) IV Continuous <Continuous>  insulin lispro (ADMELOG) corrective regimen sliding scale   SubCutaneous Before meals and at bedtime  nebivolol 2.5 milliGRAM(s) Oral daily  spironolactone 12.5 milliGRAM(s) Oral daily    MEDICATIONS  (PRN):  dextrose Oral Gel 15 Gram(s) Oral once PRN Blood Glucose LESS THAN 70 milliGRAM(s)/deciliter      Labs:                        11.5   8.08  )-----------( 92       ( 23 Oct 2024 05:30 )             35.4     10-23    141  |  108  |  17  ----------------------------<  105[H]  3.7   |  24  |  0.87    Ca    8.4      23 Oct 2024 05:30  Phos  2.5     10-23  Mg     1.8     10-23    TPro  6.0  /  Alb  3.5  /  TBili  0.6  /  DBili  x   /  AST  139[H]  /  ALT  30  /  AlkPhos  45  10-23    PT/INR - ( 23 Oct 2024 05:47 )   PT: 13.5 sec;   INR: 1.16          PTT - ( 23 Oct 2024 05:47 )  PTT:71.0 sec    Urinalysis Basic - ( 23 Oct 2024 05:30 )    Color: x / Appearance: x / SG: x / pH: x  Gluc: 105 mg/dL / Ketone: x  / Bili: x / Urobili: x   Blood: x / Protein: x / Nitrite: x   Leuk Esterase: x / RBC: x / WBC x   Sq Epi: x / Non Sq Epi: x / Bacteria: x          Radiology: Reviewed

## 2024-10-24 DIAGNOSIS — J45.909 UNSPECIFIED ASTHMA, UNCOMPLICATED: ICD-10-CM

## 2024-10-24 DIAGNOSIS — I25.10 ATHEROSCLEROTIC HEART DISEASE OF NATIVE CORONARY ARTERY WITHOUT ANGINA PECTORIS: ICD-10-CM

## 2024-10-24 DIAGNOSIS — E78.5 HYPERLIPIDEMIA, UNSPECIFIED: ICD-10-CM

## 2024-10-24 DIAGNOSIS — I10 ESSENTIAL (PRIMARY) HYPERTENSION: ICD-10-CM

## 2024-10-24 LAB
ALBUMIN SERPL ELPH-MCNC: 3.5 G/DL — SIGNIFICANT CHANGE UP (ref 3.3–5)
ALP SERPL-CCNC: 54 U/L — SIGNIFICANT CHANGE UP (ref 40–120)
ALT FLD-CCNC: 26 U/L — SIGNIFICANT CHANGE UP (ref 10–45)
ANION GAP SERPL CALC-SCNC: 9 MMOL/L — SIGNIFICANT CHANGE UP (ref 5–17)
APPEARANCE UR: CLEAR — SIGNIFICANT CHANGE UP
AST SERPL-CCNC: 80 U/L — HIGH (ref 10–40)
BACTERIA # UR AUTO: NEGATIVE /HPF — SIGNIFICANT CHANGE UP
BASOPHILS # BLD AUTO: 0.02 K/UL — SIGNIFICANT CHANGE UP (ref 0–0.2)
BASOPHILS NFR BLD AUTO: 0.3 % — SIGNIFICANT CHANGE UP (ref 0–2)
BILIRUB SERPL-MCNC: 0.6 MG/DL — SIGNIFICANT CHANGE UP (ref 0.2–1.2)
BILIRUB UR-MCNC: NEGATIVE — SIGNIFICANT CHANGE UP
BUN SERPL-MCNC: 16 MG/DL — SIGNIFICANT CHANGE UP (ref 7–23)
CALCIUM SERPL-MCNC: 8.7 MG/DL — SIGNIFICANT CHANGE UP (ref 8.4–10.5)
CAST: 1 /LPF — SIGNIFICANT CHANGE UP (ref 0–4)
CHLORIDE SERPL-SCNC: 105 MMOL/L — SIGNIFICANT CHANGE UP (ref 96–108)
CK MB CFR SERPL CALC: 15.6 NG/ML — HIGH (ref 0–6.7)
CK SERPL-CCNC: 410 U/L — HIGH (ref 30–200)
CO2 SERPL-SCNC: 23 MMOL/L — SIGNIFICANT CHANGE UP (ref 22–31)
COLOR SPEC: YELLOW — SIGNIFICANT CHANGE UP
CREAT SERPL-MCNC: 0.9 MG/DL — SIGNIFICANT CHANGE UP (ref 0.5–1.3)
DIFF PNL FLD: ABNORMAL
EGFR: 86 ML/MIN/1.73M2 — SIGNIFICANT CHANGE UP
EOSINOPHIL # BLD AUTO: 0.11 K/UL — SIGNIFICANT CHANGE UP (ref 0–0.5)
EOSINOPHIL NFR BLD AUTO: 1.6 % — SIGNIFICANT CHANGE UP (ref 0–6)
FINE GRAN CASTS #/AREA URNS AUTO: PRESENT
GLUCOSE BLDC GLUCOMTR-MCNC: 120 MG/DL — HIGH (ref 70–99)
GLUCOSE BLDC GLUCOMTR-MCNC: 140 MG/DL — HIGH (ref 70–99)
GLUCOSE BLDC GLUCOMTR-MCNC: 148 MG/DL — HIGH (ref 70–99)
GLUCOSE BLDC GLUCOMTR-MCNC: 94 MG/DL — SIGNIFICANT CHANGE UP (ref 70–99)
GLUCOSE SERPL-MCNC: 91 MG/DL — SIGNIFICANT CHANGE UP (ref 70–99)
GLUCOSE UR QL: NEGATIVE MG/DL — SIGNIFICANT CHANGE UP
HCT VFR BLD CALC: 37.8 % — LOW (ref 39–50)
HGB BLD-MCNC: 11.6 G/DL — LOW (ref 13–17)
IMM GRANULOCYTES NFR BLD AUTO: 0.3 % — SIGNIFICANT CHANGE UP (ref 0–0.9)
KETONES UR-MCNC: 15 MG/DL
LEUKOCYTE ESTERASE UR-ACNC: ABNORMAL
LYMPHOCYTES # BLD AUTO: 0.89 K/UL — LOW (ref 1–3.3)
LYMPHOCYTES # BLD AUTO: 12.9 % — LOW (ref 13–44)
MAGNESIUM SERPL-MCNC: 1.9 MG/DL — SIGNIFICANT CHANGE UP (ref 1.6–2.6)
MCHC RBC-ENTMCNC: 25.3 PG — LOW (ref 27–34)
MCHC RBC-ENTMCNC: 30.7 GM/DL — LOW (ref 32–36)
MCV RBC AUTO: 82.4 FL — SIGNIFICANT CHANGE UP (ref 80–100)
MONOCYTES # BLD AUTO: 0.94 K/UL — HIGH (ref 0–0.9)
MONOCYTES NFR BLD AUTO: 13.6 % — SIGNIFICANT CHANGE UP (ref 2–14)
MUCOUS THREADS # UR AUTO: PRESENT
NEUTROPHILS # BLD AUTO: 4.93 K/UL — SIGNIFICANT CHANGE UP (ref 1.8–7.4)
NEUTROPHILS NFR BLD AUTO: 71.3 % — SIGNIFICANT CHANGE UP (ref 43–77)
NITRITE UR-MCNC: NEGATIVE — SIGNIFICANT CHANGE UP
NRBC # BLD: 0 /100 WBCS — SIGNIFICANT CHANGE UP (ref 0–0)
PH UR: 5.5 — SIGNIFICANT CHANGE UP (ref 5–8)
PHOSPHATE SERPL-MCNC: 2.9 MG/DL — SIGNIFICANT CHANGE UP (ref 2.5–4.5)
PLATELET # BLD AUTO: 103 K/UL — LOW (ref 150–400)
POTASSIUM SERPL-MCNC: 4.4 MMOL/L — SIGNIFICANT CHANGE UP (ref 3.5–5.3)
POTASSIUM SERPL-SCNC: 4.4 MMOL/L — SIGNIFICANT CHANGE UP (ref 3.5–5.3)
PROT SERPL-MCNC: 6.1 G/DL — SIGNIFICANT CHANGE UP (ref 6–8.3)
PROT UR-MCNC: SIGNIFICANT CHANGE UP MG/DL
RBC # BLD: 4.59 M/UL — SIGNIFICANT CHANGE UP (ref 4.2–5.8)
RBC # FLD: 13.5 % — SIGNIFICANT CHANGE UP (ref 10.3–14.5)
RBC CASTS # UR COMP ASSIST: 12 /HPF — HIGH (ref 0–4)
SODIUM SERPL-SCNC: 137 MMOL/L — SIGNIFICANT CHANGE UP (ref 135–145)
SP GR SPEC: 1.02 — SIGNIFICANT CHANGE UP (ref 1–1.03)
SPERM AB SPEC-ACNC: PRESENT
SQUAMOUS # UR AUTO: 0 /HPF — SIGNIFICANT CHANGE UP (ref 0–5)
TROPONIN T, HIGH SENSITIVITY RESULT: 2130 NG/L — CRITICAL HIGH (ref 0–51)
UROBILINOGEN FLD QL: 1 MG/DL — SIGNIFICANT CHANGE UP (ref 0.2–1)
WBC # BLD: 6.91 K/UL — SIGNIFICANT CHANGE UP (ref 3.8–10.5)
WBC # FLD AUTO: 6.91 K/UL — SIGNIFICANT CHANGE UP (ref 3.8–10.5)
WBC UR QL: 4 /HPF — SIGNIFICANT CHANGE UP (ref 0–5)

## 2024-10-24 PROCEDURE — 71045 X-RAY EXAM CHEST 1 VIEW: CPT | Mod: 26

## 2024-10-24 PROCEDURE — 99291 CRITICAL CARE FIRST HOUR: CPT

## 2024-10-24 PROCEDURE — 93010 ELECTROCARDIOGRAM REPORT: CPT | Mod: 76

## 2024-10-24 RX ORDER — ISOSORBIDE MONONITRATE 60 MG/1
30 TABLET, EXTENDED RELEASE ORAL EVERY 24 HOURS
Refills: 0 | Status: DISCONTINUED | OUTPATIENT
Start: 2024-10-24 | End: 2024-10-24

## 2024-10-24 RX ORDER — LOSARTAN POTASSIUM 25 MG/1
25 TABLET ORAL EVERY 24 HOURS
Refills: 0 | Status: DISCONTINUED | OUTPATIENT
Start: 2024-10-24 | End: 2024-10-29

## 2024-10-24 RX ORDER — NITROGLYCERIN 0.6MG/HR
0.4 PATCH, TRANSDERMAL 24 HOURS TRANSDERMAL ONCE
Refills: 0 | Status: COMPLETED | OUTPATIENT
Start: 2024-10-24 | End: 2024-10-24

## 2024-10-24 RX ORDER — NITROGLYCERIN 0.6MG/HR
50 PATCH, TRANSDERMAL 24 HOURS TRANSDERMAL
Qty: 50 | Refills: 0 | Status: DISCONTINUED | OUTPATIENT
Start: 2024-10-24 | End: 2024-10-25

## 2024-10-24 RX ORDER — HEPARIN SODIUM 10000 [USP'U]/ML
5000 INJECTION INTRAVENOUS; SUBCUTANEOUS EVERY 8 HOURS
Refills: 0 | Status: DISCONTINUED | OUTPATIENT
Start: 2024-10-24 | End: 2024-10-29

## 2024-10-24 RX ADMIN — Medication 81 MILLIGRAM(S): at 11:31

## 2024-10-24 RX ADMIN — CHLORHEXIDINE GLUCONATE 1 APPLICATION(S): 40 SOLUTION TOPICAL at 06:14

## 2024-10-24 RX ADMIN — CLOPIDOGREL 75 MILLIGRAM(S): 75 TABLET ORAL at 11:31

## 2024-10-24 RX ADMIN — LOSARTAN POTASSIUM 25 MILLIGRAM(S): 25 TABLET ORAL at 10:05

## 2024-10-24 RX ADMIN — NEBIVOLOL 2.5 MILLIGRAM(S): 10 TABLET ORAL at 06:43

## 2024-10-24 RX ADMIN — HEPARIN SODIUM 5000 UNIT(S): 10000 INJECTION INTRAVENOUS; SUBCUTANEOUS at 21:51

## 2024-10-24 RX ADMIN — Medication 0.4 MILLIGRAM(S): at 12:39

## 2024-10-24 RX ADMIN — Medication 80 MILLIGRAM(S): at 21:51

## 2024-10-24 RX ADMIN — ISOSORBIDE MONONITRATE 30 MILLIGRAM(S): 60 TABLET, EXTENDED RELEASE ORAL at 13:05

## 2024-10-24 RX ADMIN — Medication 12.5 MILLIGRAM(S): at 06:43

## 2024-10-24 RX ADMIN — Medication 15 MICROGRAM(S)/MIN: at 15:52

## 2024-10-24 NOTE — PROGRESS NOTE ADULT - ATTENDING COMMENTS
78 yo man with MVCAD s/p successful PCI of RCA came for rota-assisted PCI of LAD  Procedure was complicated by no-reflow with final ADINA 1 flow   IABP was placed to aid in coronary perfusion - hemodynamically was stable throughout   Transferred to CICU for ongoing care     Overnight on heparin gtt and IABP had AIVR and CP resolved   This AM TTE shows mostly preserved LVEF with apical hypokinesis    Wean and remove IABP today    - Spironolactone 25 mg daily   - Nebivolol 2.5 mg daily  - Continue DAPT   - Ok to eat   - Start DVT ppx tomorrow    Juancarlos Hoyos MD
78 yo man with MVCAD s/p successful PCI of RCA came for rota-assisted PCI of LAD  Procedure was complicated by no-reflow with final ADINA 1 flow   IABP was placed to aid in coronary perfusion - hemodynamically was stable throughout   Transferred to CICU for ongoing care     Overnight on heparin gtt and IABP had AIVR and CP resolved   This AM TTE shows mostly preserved LVEF with apical hypokinesis    - Spironolactone 25 mg daily   - Keep IABP one more day - PTT goal 50-70s  - Continue DAPT   - Ok to eat   - Wean IABP in AM    Juancarlos Hoyos MD
80 yo man with MVCAD s/p successful PCI of RCA came for rota-assisted PCI of LAD  Procedure was complicated by no-reflow with final ADINA 1 flow   IABP was placed to aid in coronary perfusion - hemodynamically was stable throughout   Transferred to CICU for ongoing care     Overnight on heparin gtt and IABP had AIVR and CP resolved   This AM TTE shows mostly preserved LVEF with apical hypokinesis    Wean and remove IABP yesterday  This AM has mild CP, troponin is downtrending  Tolerated [place] and Nebivolol    - Spironolactone 25 mg daily   - Nebivolol 2.5 mg daily  - Losartan 25 mg daily   - Consider IMDUR 30 mg daily if CP doesn't improve  - Continue DAPT   - Ok to step down    Juancarlos Hoyos MD

## 2024-10-24 NOTE — PROVIDER CONTACT NOTE (CRITICAL VALUE NOTIFICATION) - ACTION/TREATMENT ORDERED:
Notified provider and primary RN.

## 2024-10-24 NOTE — PROGRESS NOTE ADULT - SUBJECTIVE AND OBJECTIVE BOX
Internal Medicine Progress Note  Junito Romano PGY1    ===============HOSPITAL COURSE======================  This 79-year-old male with multivessel coronary artery disease (MVCAD), status-post successful percutaneous coronary intervention (PCI) to the right coronary artery (RCA), presented for a rotational atherectomy-assisted PCI of the left anterior descending artery (LAD). The procedure was complicated by no-reflow phenomenon with a final Thrombolysis in Myocardial Infarction (ADINA) flow grade of 1. An intra-aortic balloon pump (IABP) was placed for hemodynamic support, which he tolerated well. He was transferred to the cardiac intensive care unit (CICU) where his troponins downtrended and his IABP was planned for removal on hospital day two. He was continued on dual antiplatelet therapy (DAPT), and his home medications for hypertension, hyperlipidemia, asthma, and benign prostatic hyperplasia were resumed. His diabetes remained well-controlled without insulin. He was noted to have mild anemia.    OVERNIGHT EVENTS/INTERVAL HPI: Groin checks stable, the hematoma stable overnight - fyi, noted to be more pronounced when hip is externally rotated. Overall, resting comfortably in bed.    OBJECTIVE:  Vital Signs Last 24 Hrs  T(C): 37.2 (24 Oct 2024 05:10), Max: 37.2 (24 Oct 2024 05:10)  T(F): 98.9 (24 Oct 2024 05:10), Max: 98.9 (24 Oct 2024 05:10)  HR: 66 (24 Oct 2024 07:00) (62 - 80)  BP: 125/77 (24 Oct 2024 07:00) (101/55 - 149/83)  BP(mean): 96 (24 Oct 2024 07:00) (72 - 110)  RR: 16 (24 Oct 2024 07:00) (12 - 29)  SpO2: 98% (24 Oct 2024 07:00) (81% - 99%)    Parameters below as of 24 Oct 2024 08:00  Patient On (Oxygen Delivery Method): room air      I&O's Detail    23 Oct 2024 07:01  -  24 Oct 2024 07:00  --------------------------------------------------------  IN:    Heparin Infusion: 18 mL    IV PiggyBack: 50 mL  Total IN: 68 mL    OUT:    Voided (mL): 1790 mL  Total OUT: 1790 mL    Total NET: -1722 mL        PHYSICAL   CONSTITUTIONAL: Well groomed, no apparent distress  EYES: PERRLA and symmetric, EOMI, No conjunctival or scleral injection, non-icteric  ENMT: Oral mucosa with moist membranes. Normal dentition; no pharyngeal injection or exudates  NECK: Supple, symmetric and without tracheal deviation   RESP: No respiratory distress, no use of accessory muscles; CTA b/l, no WRR  CV: RRR, Chest nontender to palpation, +S1S2, no MRG; no JVD; no peripheral edema  GI: Soft, NT, ND, no rebound, no guarding; no palpable masses; no hepatosplenomegaly; no hernia palpated  EXTREMITIES: with small hematoma in groin, more pronounced when L hip is externally rotated   NEURO: Moves all 4 extremities spontaneously   PSYCH: Appropriate insight/judgment; A+O x 3, mood and affect appropriate, recent/remote memory intact      Medications:  MEDICATIONS  (STANDING):  aspirin enteric coated 81 milliGRAM(s) Oral daily  atorvastatin 80 milliGRAM(s) Oral at bedtime  chlorhexidine 2% Cloths 1 Application(s) Topical <User Schedule>  clopidogrel Tablet 75 milliGRAM(s) Oral daily  dextrose 5%. 1000 milliLiter(s) (100 mL/Hr) IV Continuous <Continuous>  dextrose 5%. 1000 milliLiter(s) (50 mL/Hr) IV Continuous <Continuous>  dextrose 50% Injectable 25 Gram(s) IV Push once  dextrose 50% Injectable 12.5 Gram(s) IV Push once  dextrose 50% Injectable 25 Gram(s) IV Push once  glucagon  Injectable 1 milliGRAM(s) IntraMuscular once  insulin lispro (ADMELOG) corrective regimen sliding scale   SubCutaneous Before meals and at bedtime  nebivolol 2.5 milliGRAM(s) Oral daily  spironolactone 12.5 milliGRAM(s) Oral daily    MEDICATIONS  (PRN):  dextrose Oral Gel 15 Gram(s) Oral once PRN Blood Glucose LESS THAN 70 milliGRAM(s)/deciliter      Labs:                        11.6   6.91  )-----------( 103      ( 24 Oct 2024 05:30 )             37.8     10-24    137  |  105  |  16  ----------------------------<  91  4.4   |  23  |  0.90    Ca    8.7      24 Oct 2024 05:30  Phos  2.9     10-24  Mg     1.9     10-24    TPro  6.1  /  Alb  3.5  /  TBili  0.6  /  DBili  x   /  AST  80[H]  /  ALT  26  /  AlkPhos  54  10-24    PT/INR - ( 23 Oct 2024 05:47 )   PT: 13.5 sec;   INR: 1.16          PTT - ( 23 Oct 2024 09:27 )  PTT:74.2 sec    Urinalysis Basic - ( 24 Oct 2024 05:30 )    Color: x / Appearance: x / SG: x / pH: x  Gluc: 91 mg/dL / Ketone: x  / Bili: x / Urobili: x   Blood: x / Protein: x / Nitrite: x   Leuk Esterase: x / RBC: x / WBC x   Sq Epi: x / Non Sq Epi: x / Bacteria: x          Radiology: Reviewed Internal Medicine Progress Note  Junito Romano PGY1    ===============HOSPITAL COURSE======================  This 79-year-old male with multivessel coronary artery disease (MVCAD), status-post successful percutaneous coronary intervention (PCI) to the right coronary artery (RCA), presented for a rotational atherectomy-assisted PCI of the left anterior descending artery (LAD). The procedure was complicated by no-reflow phenomenon with a final Thrombolysis in Myocardial Infarction (ADINA) flow grade of 1. An intra-aortic balloon pump (IABP) was placed for hemodynamic support, which he tolerated well. He was transferred to the cardiac intensive care unit (CICU) where his troponins downtrended and his IABP was planned for removal on hospital day two. He was continued on dual antiplatelet therapy (DAPT), and his home medications for hypertension were adjusted (see plan), hyperlipidemia (continued), asthma (continued), and benign prostatic hyperplasia (held) were resumed. His diabetes remained well-controlled without any insulin needs. He was noted to have mild anemia that remained stable and without need for any blood transfusion.    OVERNIGHT EVENTS/INTERVAL HPI: Groin checks stable, the hematoma stable overnight - fyi, noted to be more pronounced when hip is externally rotated. Overall, resting comfortably in bed.    OBJECTIVE:  Vital Signs Last 24 Hrs  T(C): 37.2 (24 Oct 2024 05:10), Max: 37.2 (24 Oct 2024 05:10)  T(F): 98.9 (24 Oct 2024 05:10), Max: 98.9 (24 Oct 2024 05:10)  HR: 66 (24 Oct 2024 07:00) (62 - 80)  BP: 125/77 (24 Oct 2024 07:00) (101/55 - 149/83)  BP(mean): 96 (24 Oct 2024 07:00) (72 - 110)  RR: 16 (24 Oct 2024 07:00) (12 - 29)  SpO2: 98% (24 Oct 2024 07:00) (81% - 99%)    Parameters below as of 24 Oct 2024 08:00  Patient On (Oxygen Delivery Method): room air      I&O's Detail    23 Oct 2024 07:01  -  24 Oct 2024 07:00  --------------------------------------------------------  IN:    Heparin Infusion: 18 mL    IV PiggyBack: 50 mL  Total IN: 68 mL    OUT:    Voided (mL): 1790 mL  Total OUT: 1790 mL    Total NET: -1722 mL        PHYSICAL   CONSTITUTIONAL: Well groomed, no apparent distress  EYES: PERRLA and symmetric, EOMI, No conjunctival or scleral injection, non-icteric  ENMT: Oral mucosa with moist membranes. Normal dentition; no pharyngeal injection or exudates  NECK: Supple, symmetric and without tracheal deviation   RESP: No respiratory distress, no use of accessory muscles; CTA b/l, no WRR  CV: RRR, Chest nontender to palpation, +S1S2, no MRG; no JVD; no peripheral edema  GI: Soft, NT, ND, no rebound, no guarding; no palpable masses; no hepatosplenomegaly; no hernia palpated  EXTREMITIES: with small hematoma in groin, more pronounced when L hip is externally rotated   NEURO: Moves all 4 extremities spontaneously   PSYCH: Appropriate insight/judgment; A+O x 3, mood and affect appropriate, recent/remote memory intact      Medications:  MEDICATIONS  (STANDING):  aspirin enteric coated 81 milliGRAM(s) Oral daily  atorvastatin 80 milliGRAM(s) Oral at bedtime  chlorhexidine 2% Cloths 1 Application(s) Topical <User Schedule>  clopidogrel Tablet 75 milliGRAM(s) Oral daily  dextrose 5%. 1000 milliLiter(s) (100 mL/Hr) IV Continuous <Continuous>  dextrose 5%. 1000 milliLiter(s) (50 mL/Hr) IV Continuous <Continuous>  dextrose 50% Injectable 25 Gram(s) IV Push once  dextrose 50% Injectable 12.5 Gram(s) IV Push once  dextrose 50% Injectable 25 Gram(s) IV Push once  glucagon  Injectable 1 milliGRAM(s) IntraMuscular once  insulin lispro (ADMELOG) corrective regimen sliding scale   SubCutaneous Before meals and at bedtime  nebivolol 2.5 milliGRAM(s) Oral daily  spironolactone 12.5 milliGRAM(s) Oral daily    MEDICATIONS  (PRN):  dextrose Oral Gel 15 Gram(s) Oral once PRN Blood Glucose LESS THAN 70 milliGRAM(s)/deciliter      Labs:                        11.6   6.91  )-----------( 103      ( 24 Oct 2024 05:30 )             37.8     10-24    137  |  105  |  16  ----------------------------<  91  4.4   |  23  |  0.90    Ca    8.7      24 Oct 2024 05:30  Phos  2.9     10-24  Mg     1.9     10-24    TPro  6.1  /  Alb  3.5  /  TBili  0.6  /  DBili  x   /  AST  80[H]  /  ALT  26  /  AlkPhos  54  10-24    PT/INR - ( 23 Oct 2024 05:47 )   PT: 13.5 sec;   INR: 1.16          PTT - ( 23 Oct 2024 09:27 )  PTT:74.2 sec    Urinalysis Basic - ( 24 Oct 2024 05:30 )    Color: x / Appearance: x / SG: x / pH: x  Gluc: 91 mg/dL / Ketone: x  / Bili: x / Urobili: x   Blood: x / Protein: x / Nitrite: x   Leuk Esterase: x / RBC: x / WBC x   Sq Epi: x / Non Sq Epi: x / Bacteria: x          Radiology: Reviewed

## 2024-10-24 NOTE — PROGRESS NOTE ADULT - SUBJECTIVE AND OBJECTIVE BOX
Interventional Cardiology PA Adult Progress Note    Hospital Course:  79M, former smoker, w/ PMHx of HTN, HLD, T2DM, 3vCAD (DESx3 RCA, see below), ADRIÁN on CPAP, asthma, BPH and OA who presented to [facility] 10/15/24 for planned rota-assisted PCI of LAD w/ procedure c/b no-reflow w/ final ADINA flow of requiring IABP for coronary perfusion (removed 10/23/24) and upgrade to CCU. Course further c/b left femoral 3x3in hematoma and GLENN V2-V4 accompanied by chest pain on 10/24/24 and started on nitrates. Patient deemed stable and stepped down to [facility] cardiac telemetry 10/24/24.    Subjective Assessment:  Patient seen and examined at bedside. Patient endorsed episode of chest pain from left to right shoulder when he stood out of his bed to use commode. The pain persists however has lessened in intensity to a 2/10.     ROS Negative except as per Subjective and HPI  	  MEDICATIONS:  isosorbide   mononitrate ER Tablet (IMDUR) 30 milliGRAM(s) Oral every 24 hours  losartan 25 milliGRAM(s) Oral every 24 hours  nebivolol 2.5 milliGRAM(s) Oral daily  spironolactone 12.5 milliGRAM(s) Oral daily  atorvastatin 80 milliGRAM(s) Oral at bedtime  dextrose 50% Injectable 25 Gram(s) IV Push once  dextrose 50% Injectable 12.5 Gram(s) IV Push once  dextrose 50% Injectable 25 Gram(s) IV Push once  dextrose Oral Gel 15 Gram(s) Oral once PRN  glucagon  Injectable 1 milliGRAM(s) IntraMuscular once  insulin lispro (ADMELOG) corrective regimen sliding scale   SubCutaneous Before meals and at bedtime  aspirin enteric coated 81 milliGRAM(s) Oral daily  chlorhexidine 2% Cloths 1 Application(s) Topical <User Schedule>  clopidogrel Tablet 75 milliGRAM(s) Oral daily  dextrose 5%. 1000 milliLiter(s) IV Continuous <Continuous>  dextrose 5%. 1000 milliLiter(s) IV Continuous <Continuous>    [PHYSICAL EXAM:  TELEMETRY:  T(C): 36.9 (10-24-24 @ 09:00), Max: 37.2 (10-24-24 @ 05:10)  HR: 69 (10-24-24 @ 14:00) (61 - 76)  BP: 134/75 (10-24-24 @ 14:00) (101/55 - 140/80)  RR: 18 (10-24-24 @ 14:00) (12 - 29)  SpO2: 96% (10-24-24 @ 14:00) (93% - 99%)  Wt(kg): --  I&O's Summary    23 Oct 2024 07:01  -  24 Oct 2024 07:00  --------------------------------------------------------  IN: 68 mL / OUT: 1790 mL / NET: -1722 mL    24 Oct 2024 07:01  -  24 Oct 2024 14:40  --------------------------------------------------------  IN: 0 mL / OUT: 200 mL / NET: -200 mL      Appearance: Normal	  HEENT:   Normal oral mucosa, PERRL, EOMI	  Neck: Supple, + JVD/ - JVD; Carotid Bruit   Cardiovascular: Normal S1 S2, No JVD, No murmurs,   Respiratory: Lungs clear to auscultation/Decreased Breath Sounds/No Rales, Rhonchi, Wheezing	  Gastrointestinal:  Soft, Non-tender, + BS	  Skin: No rashes, No ecchymoses, No cyanosis  Extremities: Normal range of motion, No clubbing, cyanosis or edema  Vascular: Peripheral pulses palpable 2+ bilaterally  Neurologic: Non-focal  Psychiatry: A & O x 3, Mood & affect appropriate  	  CARDIAC MARKERS:                  11.6   6.91  )-----------( 103      ( 24 Oct 2024 05:30 )             37.8     10-24    137  |  105  |  16  ----------------------------<  91  4.4   |  23  |  0.90    Ca    8.7      24 Oct 2024 05:30  Phos  2.9     10-24  Mg     1.9     10-24    TPro  6.1  /  Alb  3.5  /  TBili  0.6  /  DBili  x   /  AST  80[H]  /  ALT  26  /  AlkPhos  54  10-24    PT/INR - ( 23 Oct 2024 05:47 )   PT: 13.5 sec;   INR: 1.16        PTT - ( 23 Oct 2024 09:27 )  PTT:74.2 sec     Interventional Cardiology PA Adult Progress Note    Hospital Course:  79M, former smoker, w/ PMHx of HTN, HLD, T2DM, 3vCAD (DESx3 RCA, see below), ADRIÁN on CPAP, asthma, BPH and OA who presented to [facility] Lab 10/15/24 for planned rota-assisted PCI of LAD w/ procedure c/b no-reflow w/ final ADINA 1 flow of requiring IABP for coronary perfusion (removed 10/23/24) and upgrade to CCU. Course further c/b left femoral 3x3in hematoma and GLENN V2-V4 accompanied by chest pain on 10/24/24 and started on nitrates. Patient deemed stable and stepped down to [facility] cardiac telemetry 10/24/24.    Subjective Assessment:  Patient seen and examined at bedside. Patient endorsed episode of chest pain from left to right shoulder when he stood out of his bed to use commode. The pain persists however has lessened in intensity to a 2/10.     ROS Negative except as per Subjective and HPI  	  MEDICATIONS:  isosorbide   mononitrate ER Tablet (IMDUR) 30 milliGRAM(s) Oral every 24 hours  losartan 25 milliGRAM(s) Oral every 24 hours  nebivolol 2.5 milliGRAM(s) Oral daily  spironolactone 12.5 milliGRAM(s) Oral daily  atorvastatin 80 milliGRAM(s) Oral at bedtime  dextrose 50% Injectable 25 Gram(s) IV Push once  dextrose 50% Injectable 12.5 Gram(s) IV Push once  dextrose 50% Injectable 25 Gram(s) IV Push once  dextrose Oral Gel 15 Gram(s) Oral once PRN  glucagon  Injectable 1 milliGRAM(s) IntraMuscular once  insulin lispro (ADMELOG) corrective regimen sliding scale   SubCutaneous Before meals and at bedtime  aspirin enteric coated 81 milliGRAM(s) Oral daily  chlorhexidine 2% Cloths 1 Application(s) Topical <User Schedule>  clopidogrel Tablet 75 milliGRAM(s) Oral daily  dextrose 5%. 1000 milliLiter(s) IV Continuous <Continuous>  dextrose 5%. 1000 milliLiter(s) IV Continuous <Continuous>    [PHYSICAL EXAM:  TELEMETRY:  T(C): 36.9 (10-24-24 @ 09:00), Max: 37.2 (10-24-24 @ 05:10)  HR: 69 (10-24-24 @ 14:00) (61 - 76)  BP: 134/75 (10-24-24 @ 14:00) (101/55 - 140/80)  RR: 18 (10-24-24 @ 14:00) (12 - 29)  SpO2: 96% (10-24-24 @ 14:00) (93% - 99%)  Wt(kg): --  I&O's Summary    23 Oct 2024 07:01  -  24 Oct 2024 07:00  --------------------------------------------------------  IN: 68 mL / OUT: 1790 mL / NET: -1722 mL    24 Oct 2024 07:01  -  24 Oct 2024 14:40  --------------------------------------------------------  IN: 0 mL / OUT: 200 mL / NET: -200 mL      Appearance: Normal	  HEENT:   Normal oral mucosa, PERRL, EOMI	  Neck: Supple, + JVD/ - JVD; Carotid Bruit   Cardiovascular: Normal S1 S2, No JVD, No murmurs,   Respiratory: Lungs clear to auscultation/Decreased Breath Sounds/No Rales, Rhonchi, Wheezing	  Gastrointestinal:  Soft, Non-tender, + BS	  Skin: No rashes, No ecchymoses, No cyanosis  Extremities: Normal range of motion, No clubbing, cyanosis or edema  Vascular: Peripheral pulses palpable 2+ bilaterally  Neurologic: Non-focal  Psychiatry: A & O x 3, Mood & affect appropriate  	  CARDIAC MARKERS:                  11.6   6.91  )-----------( 103      ( 24 Oct 2024 05:30 )             37.8     10-24    137  |  105  |  16  ----------------------------<  91  4.4   |  23  |  0.90    Ca    8.7      24 Oct 2024 05:30  Phos  2.9     10-24  Mg     1.9     10-24    TPro  6.1  /  Alb  3.5  /  TBili  0.6  /  DBili  x   /  AST  80[H]  /  ALT  26  /  AlkPhos  54  10-24    PT/INR - ( 23 Oct 2024 05:47 )   PT: 13.5 sec;   INR: 1.16        PTT - ( 23 Oct 2024 09:27 )  PTT:74.2 sec     Interventional Cardiology PA Adult Progress Note    Hospital Course:  79M, former smoker, w/ PMHx of HTN, HLD, T2DM, 3vCAD (DESx3 RCA, see below), ADRIÁN on CPAP, asthma, BPH and OA who presented to [facility] Lab 10/15/24 for planned rota-assisted PCI of LAD w/ procedure c/b no-reflow w/ final ADINA 1 flow of requiring IABP for coronary perfusion (removed 10/23/24) and upgrade to CCU. Course further c/b left femoral 3x3in hematoma and GLENN V2-V4 accompanied by chest pain on 10/24/24 and started on nitrates and eventually nitro gtt. Patient deemed stable and stepped down to [facility] cardiac telemetry 10/24/24.    Subjective Assessment:  Patient seen and examined at bedside. Patient endorsed episode of chest pain from left to right shoulder when he stood out of his bed to use commode. The pain persists however has lessened in intensity to a 2/10.     ROS Negative except as per Subjective and HPI  	  MEDICATIONS:  isosorbide   mononitrate ER Tablet (IMDUR) 30 milliGRAM(s) Oral every 24 hours  losartan 25 milliGRAM(s) Oral every 24 hours  nebivolol 2.5 milliGRAM(s) Oral daily  spironolactone 12.5 milliGRAM(s) Oral daily  atorvastatin 80 milliGRAM(s) Oral at bedtime  dextrose 50% Injectable 25 Gram(s) IV Push once  dextrose 50% Injectable 12.5 Gram(s) IV Push once  dextrose 50% Injectable 25 Gram(s) IV Push once  dextrose Oral Gel 15 Gram(s) Oral once PRN  glucagon  Injectable 1 milliGRAM(s) IntraMuscular once  insulin lispro (ADMELOG) corrective regimen sliding scale   SubCutaneous Before meals and at bedtime  aspirin enteric coated 81 milliGRAM(s) Oral daily  chlorhexidine 2% Cloths 1 Application(s) Topical <User Schedule>  clopidogrel Tablet 75 milliGRAM(s) Oral daily  dextrose 5%. 1000 milliLiter(s) IV Continuous <Continuous>  dextrose 5%. 1000 milliLiter(s) IV Continuous <Continuous>    [PHYSICAL EXAM:  TELEMETRY:  T(C): 36.9 (10-24-24 @ 09:00), Max: 37.2 (10-24-24 @ 05:10)  HR: 69 (10-24-24 @ 14:00) (61 - 76)  BP: 134/75 (10-24-24 @ 14:00) (101/55 - 140/80)  RR: 18 (10-24-24 @ 14:00) (12 - 29)  SpO2: 96% (10-24-24 @ 14:00) (93% - 99%)  Wt(kg): --  I&O's Summary    23 Oct 2024 07:01  -  24 Oct 2024 07:00  --------------------------------------------------------  IN: 68 mL / OUT: 1790 mL / NET: -1722 mL    24 Oct 2024 07:01  -  24 Oct 2024 14:40  --------------------------------------------------------  IN: 0 mL / OUT: 200 mL / NET: -200 mL      Appearance: Normal	  HEENT:   Normal oral mucosa, PERRL, EOMI	  Neck: Supple, + JVD/ - JVD; Carotid Bruit   Cardiovascular: Normal S1 S2, No JVD, No murmurs,   Respiratory: Lungs clear to auscultation/Decreased Breath Sounds/No Rales, Rhonchi, Wheezing	  Gastrointestinal:  Soft, Non-tender, + BS	  Skin: No rashes, No ecchymoses, No cyanosis  Extremities: Normal range of motion, No clubbing, cyanosis or edema  Vascular: Peripheral pulses palpable 2+ bilaterally  Neurologic: Non-focal  Psychiatry: A & O x 3, Mood & affect appropriate  	  CARDIAC MARKERS:                  11.6   6.91  )-----------( 103      ( 24 Oct 2024 05:30 )             37.8     10-24    137  |  105  |  16  ----------------------------<  91  4.4   |  23  |  0.90    Ca    8.7      24 Oct 2024 05:30  Phos  2.9     10-24  Mg     1.9     10-24    TPro  6.1  /  Alb  3.5  /  TBili  0.6  /  DBili  x   /  AST  80[H]  /  ALT  26  /  AlkPhos  54  10-24    PT/INR - ( 23 Oct 2024 05:47 )   PT: 13.5 sec;   INR: 1.16        PTT - ( 23 Oct 2024 09:27 )  PTT:74.2 sec     Interventional Cardiology PA Adult Progress Note    Hospital Course:  79M, former smoker, w/ PMHx of HTN, HLD, T2DM, 3vCAD (DESx3 RCA, see below), ADRIÁN on CPAP, asthma, BPH and OA who presented to [facility] Lab 10/15/24 for planned rota-assisted PCI of LAD w/ procedure c/b no-reflow w/ final ADINA 1 flow requiring IABP for coronary perfusion (removed 10/23/24) and upgrade to CCU. Course further c/b left femoral 3x3in hematoma and GLENN V2-V4 accompanied by chest pain on 10/24/24 and started on nitrates and eventually nitro gtt. Patient deemed stable and stepped down to [facility] cardiac telemetry 10/24/24.    Subjective Assessment:  Patient seen and examined at bedside. Patient endorsed episode of chest pain from left to right shoulder when he stood out of his bed to use commode. The pain persists however has lessened in intensity to a 2/10.     ROS Negative except as per Subjective and HPI  	  MEDICATIONS:  isosorbide   mononitrate ER Tablet (IMDUR) 30 milliGRAM(s) Oral every 24 hours  losartan 25 milliGRAM(s) Oral every 24 hours  nebivolol 2.5 milliGRAM(s) Oral daily  spironolactone 12.5 milliGRAM(s) Oral daily  atorvastatin 80 milliGRAM(s) Oral at bedtime  dextrose 50% Injectable 25 Gram(s) IV Push once  dextrose 50% Injectable 12.5 Gram(s) IV Push once  dextrose 50% Injectable 25 Gram(s) IV Push once  dextrose Oral Gel 15 Gram(s) Oral once PRN  glucagon  Injectable 1 milliGRAM(s) IntraMuscular once  insulin lispro (ADMELOG) corrective regimen sliding scale   SubCutaneous Before meals and at bedtime  aspirin enteric coated 81 milliGRAM(s) Oral daily  chlorhexidine 2% Cloths 1 Application(s) Topical <User Schedule>  clopidogrel Tablet 75 milliGRAM(s) Oral daily  dextrose 5%. 1000 milliLiter(s) IV Continuous <Continuous>  dextrose 5%. 1000 milliLiter(s) IV Continuous <Continuous>    [PHYSICAL EXAM:  TELEMETRY:  T(C): 36.9 (10-24-24 @ 09:00), Max: 37.2 (10-24-24 @ 05:10)  HR: 69 (10-24-24 @ 14:00) (61 - 76)  BP: 134/75 (10-24-24 @ 14:00) (101/55 - 140/80)  RR: 18 (10-24-24 @ 14:00) (12 - 29)  SpO2: 96% (10-24-24 @ 14:00) (93% - 99%)  Wt(kg): --  I&O's Summary    23 Oct 2024 07:01  -  24 Oct 2024 07:00  --------------------------------------------------------  IN: 68 mL / OUT: 1790 mL / NET: -1722 mL    24 Oct 2024 07:01  -  24 Oct 2024 14:40  --------------------------------------------------------  IN: 0 mL / OUT: 200 mL / NET: -200 mL      Appearance: Normal	  HEENT:   Normal oral mucosa, PERRL, EOMI	  Neck: Supple, + JVD/ - JVD; Carotid Bruit   Cardiovascular: Normal S1 S2, No JVD, No murmurs,   Respiratory: Lungs clear to auscultation/Decreased Breath Sounds/No Rales, Rhonchi, Wheezing	  Gastrointestinal:  Soft, Non-tender, + BS	  Skin: No rashes, No ecchymoses, No cyanosis  Extremities: Normal range of motion, No clubbing, cyanosis or edema  Vascular: Peripheral pulses palpable 2+ bilaterally  Neurologic: Non-focal  Psychiatry: A & O x 3, Mood & affect appropriate  	  CARDIAC MARKERS:                  11.6   6.91  )-----------( 103      ( 24 Oct 2024 05:30 )             37.8     10-24    137  |  105  |  16  ----------------------------<  91  4.4   |  23  |  0.90    Ca    8.7      24 Oct 2024 05:30  Phos  2.9     10-24  Mg     1.9     10-24    TPro  6.1  /  Alb  3.5  /  TBili  0.6  /  DBili  x   /  AST  80[H]  /  ALT  26  /  AlkPhos  54  10-24    PT/INR - ( 23 Oct 2024 05:47 )   PT: 13.5 sec;   INR: 1.16        PTT - ( 23 Oct 2024 09:27 )  PTT:74.2 sec     CCU STEPDOWN TO [facility] CARDIAC TELEMTRY    Hospital Course:  79M, former smoker, w/ PMHx of HTN, HLD, T2DM, 3vCAD (DESx3 RCA, see below), ADRIÁN on CPAP, asthma, BPH and OA who presented to [facility] Cath Lab 10/15/24 for planned rota-assisted PCI of LAD w/ procedure c/b no-reflow w/ final ADINA 1 flow requiring IABP for coronary perfusion (removed 10/23/24) and upgrade to CCU. Course further c/b left femoral 3x3in hematoma and GLENN V2-V4 accompanied by chest pain on 10/24/24 and started on nitrates and eventually nitro gtt. Patient deemed stable and stepped down to [facility] cardiac telemetry 10/24/24.    Subjective Assessment:  Patient seen and examined at bedside. Patient endorsed episode of chest pain from left to right shoulder when he stood out of his bed to use commode. The pain persists however has lessened in intensity to a 2/10.     ROS Negative except as per Subjective and HPI  	  MEDICATIONS:  isosorbide   mononitrate ER Tablet (IMDUR) 30 milliGRAM(s) Oral every 24 hours  losartan 25 milliGRAM(s) Oral every 24 hours  nebivolol 2.5 milliGRAM(s) Oral daily  spironolactone 12.5 milliGRAM(s) Oral daily  atorvastatin 80 milliGRAM(s) Oral at bedtime  dextrose 50% Injectable 25 Gram(s) IV Push once  dextrose 50% Injectable 12.5 Gram(s) IV Push once  dextrose 50% Injectable 25 Gram(s) IV Push once  dextrose Oral Gel 15 Gram(s) Oral once PRN  glucagon  Injectable 1 milliGRAM(s) IntraMuscular once  insulin lispro (ADMELOG) corrective regimen sliding scale   SubCutaneous Before meals and at bedtime  aspirin enteric coated 81 milliGRAM(s) Oral daily  chlorhexidine 2% Cloths 1 Application(s) Topical <User Schedule>  clopidogrel Tablet 75 milliGRAM(s) Oral daily  dextrose 5%. 1000 milliLiter(s) IV Continuous <Continuous>  dextrose 5%. 1000 milliLiter(s) IV Continuous <Continuous>    [PHYSICAL EXAM:  TELEMETRY:  T(C): 36.9 (10-24-24 @ 09:00), Max: 37.2 (10-24-24 @ 05:10)  HR: 69 (10-24-24 @ 14:00) (61 - 76)  BP: 134/75 (10-24-24 @ 14:00) (101/55 - 140/80)  RR: 18 (10-24-24 @ 14:00) (12 - 29)  SpO2: 96% (10-24-24 @ 14:00) (93% - 99%)  Wt(kg): --  I&O's Summary    23 Oct 2024 07:01  -  24 Oct 2024 07:00  --------------------------------------------------------  IN: 68 mL / OUT: 1790 mL / NET: -1722 mL    24 Oct 2024 07:01  -  24 Oct 2024 14:40  --------------------------------------------------------  IN: 0 mL / OUT: 200 mL / NET: -200 mL      Appearance: Normal	  HEENT:   Normal oral mucosa, PERRL, EOMI	  Neck: Supple, + JVD/ - JVD; Carotid Bruit   Cardiovascular: Normal S1 S2, No JVD, No murmurs,   Respiratory: Lungs clear to auscultation/Decreased Breath Sounds/No Rales, Rhonchi, Wheezing	  Gastrointestinal:  Soft, Non-tender, + BS	  Skin: No rashes, No ecchymoses, No cyanosis  Extremities: Normal range of motion, No clubbing, cyanosis or edema  Vascular: Peripheral pulses palpable 2+ bilaterally  Neurologic: Non-focal  Psychiatry: A & O x 3, Mood & affect appropriate  	  CARDIAC MARKERS:                  11.6   6.91  )-----------( 103      ( 24 Oct 2024 05:30 )             37.8     10-24    137  |  105  |  16  ----------------------------<  91  4.4   |  23  |  0.90    Ca    8.7      24 Oct 2024 05:30  Phos  2.9     10-24  Mg     1.9     10-24    TPro  6.1  /  Alb  3.5  /  TBili  0.6  /  DBili  x   /  AST  80[H]  /  ALT  26  /  AlkPhos  54  10-24    PT/INR - ( 23 Oct 2024 05:47 )   PT: 13.5 sec;   INR: 1.16        PTT - ( 23 Oct 2024 09:27 )  PTT:74.2 sec     CCU STEPDOWN TO [facility] CARDIAC TELEMTRY    Hospital Course:  79M, former smoker, w/ PMHx of HTN, HLD, T2DM, 3vCAD (DESx3 RCA, see below), ADRIÁN on CPAP, asthma, BPH and OA who presented to [facility] Lab 10/15/24 for planned rota-assisted PCI of LAD w/ procedure c/b no-reflow w/ final ADINA 1 flow requiring IABP for coronary perfusion (removed 10/23/24) and upgrade to CCU. Course further c/b left femoral 3x3in hematoma and GLENN V2-V4 accompanied by chest pain on 10/24/24 and started on nitrates and eventually nitro gtt. Patient deemed stable and stepped down to [facility] cardiac telemetry 10/24/24.    Subjective Assessment:  Patient seen and examined at bedside. Patient endorsed episode of chest pain from left to right shoulder when he stood out of his bed to use commode. The pain persists however has lessened in intensity to a 2/10.     ROS Negative except as per Subjective and HPI  	  MEDICATIONS:  isosorbide   mononitrate ER Tablet (IMDUR) 30 milliGRAM(s) Oral every 24 hours  losartan 25 milliGRAM(s) Oral every 24 hours  nebivolol 2.5 milliGRAM(s) Oral daily  spironolactone 12.5 milliGRAM(s) Oral daily  atorvastatin 80 milliGRAM(s) Oral at bedtime  dextrose 50% Injectable 25 Gram(s) IV Push once  dextrose 50% Injectable 12.5 Gram(s) IV Push once  dextrose 50% Injectable 25 Gram(s) IV Push once  dextrose Oral Gel 15 Gram(s) Oral once PRN  glucagon  Injectable 1 milliGRAM(s) IntraMuscular once  insulin lispro (ADMELOG) corrective regimen sliding scale   SubCutaneous Before meals and at bedtime  aspirin enteric coated 81 milliGRAM(s) Oral daily  chlorhexidine 2% Cloths 1 Application(s) Topical <User Schedule>  clopidogrel Tablet 75 milliGRAM(s) Oral daily  dextrose 5%. 1000 milliLiter(s) IV Continuous <Continuous>  dextrose 5%. 1000 milliLiter(s) IV Continuous <Continuous>    [PHYSICAL EXAM:  TELEMETRY:  T(C): 36.9 (10-24-24 @ 09:00), Max: 37.2 (10-24-24 @ 05:10)  HR: 69 (10-24-24 @ 14:00) (61 - 76)  BP: 134/75 (10-24-24 @ 14:00) (101/55 - 140/80)  RR: 18 (10-24-24 @ 14:00) (12 - 29)  SpO2: 96% (10-24-24 @ 14:00) (93% - 99%)  Wt(kg): --  I&O's Summary    23 Oct 2024 07:01  -  24 Oct 2024 07:00  --------------------------------------------------------  IN: 68 mL / OUT: 1790 mL / NET: -1722 mL    24 Oct 2024 07:01  -  24 Oct 2024 14:40  --------------------------------------------------------  IN: 0 mL / OUT: 200 mL / NET: -200 mL      Appearance: Normal	  HEENT:   Normal oral mucosa, PERRL, EOMI	  Neck: Supple, - JVD  Cardiovascular: Normal S1 S2, No JVD, No murmurs,   Respiratory: Lungs clear to auscultation	  Gastrointestinal:  Soft, Non-tender, + BS	  Skin: No rashes, No ecchymoses, No cyanosis  Extremities: LFA access site stable no hematoma  Vascular: 2+ b/l  Neurologic: Non-focal  Psychiatry: A & O x 3, Mood & affect appropriate  	  CARDIAC MARKERS:                  11.6   6.91  )-----------( 103      ( 24 Oct 2024 05:30 )             37.8     10-24    137  |  105  |  16  ----------------------------<  91  4.4   |  23  |  0.90    Ca    8.7      24 Oct 2024 05:30  Phos  2.9     10-24  Mg     1.9     10-24    TPro  6.1  /  Alb  3.5  /  TBili  0.6  /  DBili  x   /  AST  80[H]  /  ALT  26  /  AlkPhos  54  10-24    PT/INR - ( 23 Oct 2024 05:47 )   PT: 13.5 sec;   INR: 1.16        PTT - ( 23 Oct 2024 09:27 )  PTT:74.2 sec     CCU STEPDOWN TO [facility] CARDIAC TELEMTRY    Hospital Course:  79M, former smoker, w/ PMHx of HTN, HLD, T2DM, 3vCAD (DESx3 RCA, see below), ADRIÁN on CPAP, asthma, BPH and OA who presented to [facility] Cath Lab 10/15/24 for planned rota-assisted PCI of LAD w/ procedure c/b no-reflow w/ final ADINA 1 flow requiring IABP for coronary perfusion (removed 10/23/24) and upgrade to CCU. Course further c/b left femoral 3x3in hematoma and GLENN V2-V4 accompanied by chest pain on 10/24/24 and started on nitrates and eventually nitro gtt. Patient deemed stable and stepped down to [facility] cardiac telemetry 10/24/24.    Subjective Assessment:  Patient seen and examined at bedside. Patient endorsed episode of chest pain from left to right shoulder when he stood out of his bed to use commode. The pain persists however has lessened in intensity to a 2/10. Patient endorses intermittent frequency in urine since last PCI.    ROS Negative except as per Subjective and HPI  	  MEDICATIONS:  isosorbide   mononitrate ER Tablet (IMDUR) 30 milliGRAM(s) Oral every 24 hours  losartan 25 milliGRAM(s) Oral every 24 hours  nebivolol 2.5 milliGRAM(s) Oral daily  spironolactone 12.5 milliGRAM(s) Oral daily  atorvastatin 80 milliGRAM(s) Oral at bedtime  dextrose 50% Injectable 25 Gram(s) IV Push once  dextrose 50% Injectable 12.5 Gram(s) IV Push once  dextrose 50% Injectable 25 Gram(s) IV Push once  dextrose Oral Gel 15 Gram(s) Oral once PRN  glucagon  Injectable 1 milliGRAM(s) IntraMuscular once  insulin lispro (ADMELOG) corrective regimen sliding scale   SubCutaneous Before meals and at bedtime  aspirin enteric coated 81 milliGRAM(s) Oral daily  chlorhexidine 2% Cloths 1 Application(s) Topical <User Schedule>  clopidogrel Tablet 75 milliGRAM(s) Oral daily  dextrose 5%. 1000 milliLiter(s) IV Continuous <Continuous>  dextrose 5%. 1000 milliLiter(s) IV Continuous <Continuous>    [PHYSICAL EXAM:  TELEMETRY:  T(C): 36.9 (10-24-24 @ 09:00), Max: 37.2 (10-24-24 @ 05:10)  HR: 69 (10-24-24 @ 14:00) (61 - 76)  BP: 134/75 (10-24-24 @ 14:00) (101/55 - 140/80)  RR: 18 (10-24-24 @ 14:00) (12 - 29)  SpO2: 96% (10-24-24 @ 14:00) (93% - 99%)  Wt(kg): --  I&O's Summary    23 Oct 2024 07:01  -  24 Oct 2024 07:00  --------------------------------------------------------  IN: 68 mL / OUT: 1790 mL / NET: -1722 mL    24 Oct 2024 07:01  -  24 Oct 2024 14:40  --------------------------------------------------------  IN: 0 mL / OUT: 200 mL / NET: -200 mL      Appearance: Normal	  HEENT:   Normal oral mucosa, PERRL, EOMI	  Neck: Supple, - JVD  Cardiovascular: Normal S1 S2, No JVD, No murmurs,   Respiratory: Lungs clear to auscultation	  Gastrointestinal:  Soft, Non-tender, + BS	  Skin: No rashes, No ecchymoses, No cyanosis  Extremities: LFA access site stable no hematoma  Vascular: 2+ b/l  Neurologic: Non-focal  Psychiatry: A & O x 3, Mood & affect appropriate  	  CARDIAC MARKERS:                  11.6   6.91  )-----------( 103      ( 24 Oct 2024 05:30 )             37.8     10-24    137  |  105  |  16  ----------------------------<  91  4.4   |  23  |  0.90    Ca    8.7      24 Oct 2024 05:30  Phos  2.9     10-24  Mg     1.9     10-24    TPro  6.1  /  Alb  3.5  /  TBili  0.6  /  DBili  x   /  AST  80[H]  /  ALT  26  /  AlkPhos  54  10-24    PT/INR - ( 23 Oct 2024 05:47 )   PT: 13.5 sec;   INR: 1.16        PTT - ( 23 Oct 2024 09:27 )  PTT:74.2 sec     CCU STEPDOWN TO 5URIS CARDIAC TELEMTRY    Hospital Course:  79M, former smoker, w/ PMHx of HTN, HLD, T2DM, 3vCAD (DESx3 RCA, see below), ADRIÁN on CPAP, asthma, BPH and OA who presented to [facility] Lab 10/21/24 for planned rota-assisted PCI of LAD and now s/p rota/FAITH x 3 p/mLAD w/ procedure c/b no-reflow of dLAD requiring IABP for coronary perfusion (removed 10/23/24) and upgrade to CCU. Course further c/b left femoral 3x3in hematoma (RESOLVED) and GLENN V2-V4 accompanied by chest pain on 10/24/24 and started on nitrates and eventually nitro gtt w/ improvement in pain. Patient deemed stable and stepped down to [facility] cardiac telemetry 10/24/24.    Subjective Assessment:  Patient seen and examined at bedside. Patient endorsed episode of chest pain from left to right shoulder when he stood out of his bed to use commode. The pain persists however has lessened in intensity to a 2/10. Patient endorses intermittent frequency in urine since last PCI.    ROS Negative except as per Subjective and HPI  	  MEDICATIONS:  isosorbide   mononitrate ER Tablet (IMDUR) 30 milliGRAM(s) Oral every 24 hours  losartan 25 milliGRAM(s) Oral every 24 hours  nebivolol 2.5 milliGRAM(s) Oral daily  spironolactone 12.5 milliGRAM(s) Oral daily  atorvastatin 80 milliGRAM(s) Oral at bedtime  dextrose 50% Injectable 25 Gram(s) IV Push once  dextrose 50% Injectable 12.5 Gram(s) IV Push once  dextrose 50% Injectable 25 Gram(s) IV Push once  dextrose Oral Gel 15 Gram(s) Oral once PRN  glucagon  Injectable 1 milliGRAM(s) IntraMuscular once  insulin lispro (ADMELOG) corrective regimen sliding scale   SubCutaneous Before meals and at bedtime  aspirin enteric coated 81 milliGRAM(s) Oral daily  chlorhexidine 2% Cloths 1 Application(s) Topical <User Schedule>  clopidogrel Tablet 75 milliGRAM(s) Oral daily  dextrose 5%. 1000 milliLiter(s) IV Continuous <Continuous>  dextrose 5%. 1000 milliLiter(s) IV Continuous <Continuous>    [PHYSICAL EXAM:  TELEMETRY:  T(C): 36.9 (10-24-24 @ 09:00), Max: 37.2 (10-24-24 @ 05:10)  HR: 69 (10-24-24 @ 14:00) (61 - 76)  BP: 134/75 (10-24-24 @ 14:00) (101/55 - 140/80)  RR: 18 (10-24-24 @ 14:00) (12 - 29)  SpO2: 96% (10-24-24 @ 14:00) (93% - 99%)  Wt(kg): --  I&O's Summary    23 Oct 2024 07:01  -  24 Oct 2024 07:00  --------------------------------------------------------  IN: 68 mL / OUT: 1790 mL / NET: -1722 mL    24 Oct 2024 07:01  -  24 Oct 2024 14:40  --------------------------------------------------------  IN: 0 mL / OUT: 200 mL / NET: -200 mL      Appearance: Normal	  HEENT:   Normal oral mucosa, PERRL, EOMI	  Neck: Supple, - JVD  Cardiovascular: Normal S1 S2, No JVD, No murmurs,   Respiratory: Lungs clear to auscultation	  Gastrointestinal:  Soft, Non-tender, + BS	  Skin: No rashes, No ecchymoses, No cyanosis  Extremities: LFA access site stable no hematoma  Vascular: 2+ b/l  Neurologic: Non-focal  Psychiatry: A & O x 3, Mood & affect appropriate  	  CARDIAC MARKERS:                  11.6   6.91  )-----------( 103      ( 24 Oct 2024 05:30 )             37.8     10-24    137  |  105  |  16  ----------------------------<  91  4.4   |  23  |  0.90    Ca    8.7      24 Oct 2024 05:30  Phos  2.9     10-24  Mg     1.9     10-24    TPro  6.1  /  Alb  3.5  /  TBili  0.6  /  DBili  x   /  AST  80[H]  /  ALT  26  /  AlkPhos  54  10-24    PT/INR - ( 23 Oct 2024 05:47 )   PT: 13.5 sec;   INR: 1.16        PTT - ( 23 Oct 2024 09:27 )  PTT:74.2 sec

## 2024-10-24 NOTE — PROGRESS NOTE ADULT - ASSESSMENT
80 yo man with MVCAD s/p successful PCI of RCA came for rota-assisted PCI of LAD. Procedure was complicated by no-reflow with final ADINA 1 flow IABP was placed to aid in coronary perfusion - hemodynamically was stable throughout   Transferred to CICU for ongoing care.    NEURO  AOx3     CARDIOVASCULAR  #IABP s/p 10/21 insertion  Balloon pump removed 10/23/24. Now with small hematoma that is more pronounced when hip externally rotated  - continue to monitor hematoma to ensure resolution    #3vCAD   DESx3 RCA  Home regimen: Aspirin 81 qD and Clopigrel 75 qD   GDMT: nebivolol 5mg PO (Beta Blocker) and losartan 100 qD  - c/w DAPT  - c/w nebivolol 2.5 mg PO q24     #HTN  Home regimen: amlodipine 5 qD and losartan 100 qD  - c/w aldactone 12.5     #HLD   Home regimen: Rosuvastatin   - c/w atorvastatin    PULM/RESPIRATORY   #ADRIÁN   - on BiPAP at baseline    #Asthma  Home regimen: Symbicory 160 mg- 4.5 mcg x 2 puff qDay     GASTROINTESTINAL   Diet: DASH/TLC diet   GI PPx not indicated     RENAL  Cr stable, SHANTEL    GENITOURINARY   #BPH  - with zheng      ENDOCRINE  #DM-II   A1c 6.3, estimate average glucose 134  Home regimen: none   - mISS - has not had any insulin needs at this time     HEMATOLOGIC   #Anemia   HgB slightly low 12.8, baseline last month 14.6     INFECTIOUS DISEASE  SHANTEL    MSK  SHANTEL     DERMATOLOGY   SHANTEL    PROPHYLACTIC   F: none indicated    E: replete as needed; Keep K>4, Mg >2   D: DASH/TLC  DVT Proph: DAPT   Code status: FULL    78 yo man with MVCAD s/p successful PCI of RCA came for rota-assisted PCI of LAD. Procedure was complicated by no-reflow with final ADINA 1 flow IABP was placed to aid in coronary perfusion - hemodynamically was stable throughout   Transferred to CICU for ongoing care.    NEURO  AOx3     CARDIOVASCULAR  #IABP s/p 10/21 insertion  Balloon pump removed 10/23/24. Now with small hematoma that is more pronounced when hip externally rotated  - continue to monitor hematoma to ensure resolution    #3vCAD   DESx3 RCA  Home regimen: Aspirin 81 qD and Clopigrel 75 qD   GDMT: nebivolol 5mg PO (Beta Blocker) and losartan 100 qD  - c/w DAPT  - c/w nebivolol 2.5 mg PO q24   - Start losartan 25 q24 PO    #HTN  Home regimen: amlodipine 5 qD and losartan 100 qD  - c/w aldactone 12.5     #HLD   Home regimen: Rosuvastatin   - c/w atorvastatin    PULM/RESPIRATORY   #ADRIÁN   - on BiPAP at baseline    #Asthma  Home regimen: Symbicory 160 mg- 4.5 mcg x 2 puff qDay     GASTROINTESTINAL   Diet: DASH/TLC diet   GI PPx not indicated     RENAL  Cr stable, SHANTEL    GENITOURINARY   #BPH  - with zheng      ENDOCRINE  #DM-II   A1c 6.3, estimate average glucose 134  Home regimen: none   - mISS - has not had any insulin needs at this time     HEMATOLOGIC   #Anemia   HgB slightly low 12.8, baseline last month 14.6     INFECTIOUS DISEASE  SHANTEL    MSK  SHANTEL     DERMATOLOGY   SHANTEL    PROPHYLACTIC   F: none indicated    E: replete as needed; Keep K>4, Mg >2   D: DASH/TLC  DVT Proph: DAPT   Code status: FULL    78 yo man with MVCAD s/p successful PCI of RCA came for rota-assisted PCI of LAD. Procedure was complicated by no-reflow with final ADINA 1 flow IABP was placed to aid in coronary perfusion - hemodynamically was stable throughout   Transferred to CICU for ongoing care.    NEURO  AOx3     CARDIOVASCULAR  #IABP s/p 10/21 insertion  Balloon pump removed 10/23/24. Now with small hematoma that is more pronounced when hip externally rotated  - continue to monitor hematoma to ensure resolution    #3vCAD   DESx3 RCA  Home regimen: Aspirin 81 qD and Clopigrel 75 qD   GDMT: nebivolol 5mg PO (Beta Blocker) and losartan 100 qD  - c/w DAPT  - c/w nebivolol 2.5 mg PO q24   - Start losartan 25 q24 PO (plan for losartan 50 q24 PO at discharge)     #HTN  Home regimen: amlodipine 5 qD and losartan 100 qD  - c/w aldactone 12.5     #HLD   Home regimen: Rosuvastatin   - c/w atorvastatin    PULM/RESPIRATORY   #ADRIÁN   - on BiPAP at baseline    #Asthma  Home regimen: Symbicory 160 mg- 4.5 mcg x 2 puff qDay     GASTROINTESTINAL   Diet: DASH/TLC diet   GI PPx not indicated     RENAL  Cr stable, SHANTEL    GENITOURINARY   #BPH  - with zheng      ENDOCRINE  #DM-II   A1c 6.3, estimate average glucose 134  Home regimen: none   - mISS - has not had any insulin needs at this time     HEMATOLOGIC   #Anemia   HgB slightly low 12.8, baseline last month 14.6     INFECTIOUS DISEASE  SHANTEL    MSK  SHANTEL     DERMATOLOGY   SHANTEL    PROPHYLACTIC   F: none indicated    E: replete as needed; Keep K>4, Mg >2   D: DASH/TLC  DVT Proph: DAPT   Code status: FULL    80 yo man with MVCAD s/p successful PCI of RCA came for rota-assisted PCI of LAD. Procedure was complicated by no-reflow with final ADINA 1 flow IABP was placed to aid in coronary perfusion - hemodynamically was stable throughout   Transferred to CICU for ongoing care.    NEURO  AOx3     CARDIOVASCULAR  #IABP s/p 10/21 insertion  Balloon pump removed 10/23/24. Hematoma resolved     #STEMI   With new elevations in V2 and increased elevations in V3   - Sublingual nitro x1   - Start imdur 30 q24 hours     #3vCAD   DESx3 RCA  Home regimen: Aspirin 81 qD and Clopigrel 75 qD   GDMT: nebivolol 5mg PO (Beta Blocker) and losartan 100 qD  - c/w DAPT  - c/w nebivolol 2.5 mg PO q24   - Start losartan 25 q24 PO (plan for losartan 50 q24 PO at discharge)     #HTN  Home regimen: amlodipine 5 qD and losartan 100 qD  - c/w aldactone 12.5     #HLD   Home regimen: Rosuvastatin   - c/w atorvastatin    PULM/RESPIRATORY   #ADRIÁN   - on BiPAP at baseline    #Asthma  Home regimen: Symbicory 160 mg- 4.5 mcg x 2 puff qDay     GASTROINTESTINAL   Diet: DASH/TLC diet   GI PPx not indicated     RENAL  Cr stable, SHANTEL    GENITOURINARY   #BPH  - with zheng      ENDOCRINE  #DM-II   A1c 6.3, estimate average glucose 134  Home regimen: none   - mISS - has not had any insulin needs at this time     HEMATOLOGIC   #Anemia   HgB slightly low 12.8, baseline last month 14.6     INFECTIOUS DISEASE  SHANTEL    MSK  SHANTEL     DERMATOLOGY   SHANTEL    PROPHYLACTIC   F: none indicated    E: replete as needed; Keep K>4, Mg >2   D: DASH/TLC  DVT Proph: DAPT   Code status: FULL    78 yo man with MVCAD s/p successful PCI of RCA came for rota-assisted PCI of LAD. Procedure was complicated by no-reflow with final ADINA 1 flow IABP was placed to aid in coronary perfusion - hemodynamically was stable throughout   Transferred to CICU for ongoing care.    NEURO  AOx3     CARDIOVASCULAR  #IABP s/p 10/21 insertion  Balloon pump removed 10/23/24. Hematoma resolved. With mild chest pain   - Sublingual nitro x1   - Start imdur 30 q24 hours     #3vCAD   DESx3 RCA  Home regimen: Aspirin 81 qD and Clopigrel 75 qD   GDMT: nebivolol 5mg PO (Beta Blocker) and losartan 100 qD  - c/w DAPT  - c/w nebivolol 2.5 mg PO q24   - Start losartan 25 q24 PO (plan for losartan 50 q24 PO at discharge)     #HTN  Home regimen: amlodipine 5 qD and losartan 100 qD  - c/w aldactone 12.5     #HLD   Home regimen: Rosuvastatin   - c/w atorvastatin    PULM/RESPIRATORY   #ADRIÁN   - on BiPAP at baseline    #Asthma  Home regimen: Symbicory 160 mg- 4.5 mcg x 2 puff qDay     GASTROINTESTINAL   Diet: DASH/TLC diet   GI PPx not indicated     RENAL  Cr stable, SHANTEL    GENITOURINARY   #BPH  - with zheng      ENDOCRINE  #DM-II   A1c 6.3, estimate average glucose 134  Home regimen: none   - mISS - has not had any insulin needs at this time     HEMATOLOGIC   #Anemia   HgB slightly low 12.8, baseline last month 14.6     INFECTIOUS DISEASE  SHANTEL    MSK  SHANTEL     DERMATOLOGY   SHANTEL    PROPHYLACTIC   F: none indicated    E: replete as needed; Keep K>4, Mg >2   D: DASH/TLC  DVT Proph: DAPT   Code status: FULL    80 yo man with MVCAD s/p successful PCI of RCA came for rota-assisted PCI of LAD. Procedure was complicated by no-reflow with final ADINA 1 flow IABP was placed to aid in coronary perfusion - hemodynamically was stable throughout   Transferred to CICU for ongoing care.    NEURO  AOx3     CARDIOVASCULAR  #IABP s/p 10/21 insertion  Increase vascular supply during diastole   Balloon pump removed 10/23/24. Hematoma resolved. With mild chest pain   - Sublingual nitro x1   - Start imdur 30 q24 hours     #3vCAD (in the past)  DESx3 RCA  Home regimen: Aspirin 81 qD and Clopigrel 75 qD   GDMT: nebivolol 5mg PO (Beta Blocker) and losartan 100 qD  - c/w DAPT  - c/w nebivolol 2.5 mg PO q24   - Start losartan 25 q24 PO (plan for losartan 50 q24 PO at discharge)     #HTN  Home regimen: amlodipine 5 qD and losartan 100 qD  - c/w aldactone 12.5     #HLD   Home regimen: Rosuvastatin   - c/w atorvastatin    PULM/RESPIRATORY   #ADRIÁN   - on BiPAP at baseline    #Asthma  Home regimen: Symbicory 160 mg- 4.5 mcg x 2 puff qDay     GASTROINTESTINAL   Diet: DASH/TLC diet   GI PPx not indicated     RENAL  Cr stable, SHANTEL    GENITOURINARY   #BPH  - with zheng      ENDOCRINE  #DM-II   A1c 6.3, estimate average glucose 134  Home regimen: none   - mISS - has not had any insulin needs at this time     HEMATOLOGIC   #Anemia   HgB slightly low 12.8, baseline last month 14.6     INFECTIOUS DISEASE  SHANTEL    MSK  SHANTEL     DERMATOLOGY   SHANTEL    PROPHYLACTIC   F: none indicated    E: replete as needed; Keep K>4, Mg >2   D: DASH/TLC  DVT Proph: DAPT   Code status: FULL

## 2024-10-24 NOTE — PROGRESS NOTE ADULT - PROBLEM SELECTOR PLAN 4
Home regimen: Symbicory 160 mg- 4.5 mcg x 2 puff qDay Home regimen: Symbicory 160 mg- 4.5 mcg x 2 puff qDay      F: Not indicated  E: Replete if K<4 or Mag<2  N: DASH Diet  VTEppx:   Dispo:  PT: Home regimen: Symbicory 160 mg- 4.5 mcg x 2 puff qDay      F: Not indicated  E: Replete if K<4 or Mag<2  N: DASH Diet  VTEppx: Heparin SQ  Dispo: when chest pain improves  PT: ordered

## 2024-10-24 NOTE — PROGRESS NOTE ADULT - PROBLEM SELECTOR PLAN 2
Home regimen: amlodipine 5 qD and losartan 100 qD  - c/w aldactone 12.5 holding Amlodipine  - continue Losartan 25 mg daily (Home 100 mg daily), Nebivolol 5 mg daily and Spironolactone 12.5 mg daily

## 2024-10-24 NOTE — PROGRESS NOTE ADULT - PROBLEM SELECTOR PLAN 1
DESx3 RCA  Home regimen: Aspirin 81 qD and Clopigrel 75 qD   GDMT: nebivolol 5mg PO (Beta Blocker) and losartan 100 qD  - c/w DAPT  - c/w nebivolol 2.5 mg PO q24   - Start losartan 25 q24 PO (plan for losartan 50 q24 PO at discharge)         CARDIOVASCULAR  #IABP s/p 10/21 insertion  Increase vascular supply during diastole   Balloon pump removed 10/23/24. Hematoma resolved. With mild chest pain   - Sublingual nitro x1   - Start imdur 30 q24 hours -- Cardiac cath (9/20/24) @ [facility]: staged PCI RCA: DESx3 m/pRCA 99%. Plan to return for residual LAD. Access RFA PC.   -- Cardiac Catheterization (10/21/24): rota-assisted/IVUS guided FAITH x 3 p/mLAD w/ IABP placed for poor flow in dLAD with imporvement of flow. Right radial access and left femoral access (for IABP)   -- TTE (9/13/24): LVEF 62%, mild LVH, trace TR, trace AI.  -- TTE (10/22/24): entire apex and mid and apical anterior septum are hypokinetic, LVEF 55-60%, GIDD, no pericardial effusion  - Antianginal: Nitro drip 50mcg/min  - DAPT: Aspirin/Plavix  - monitor for worsening chest pain and EKG changes -- Cardiac cath (9/20/24) @ [facility]: staged PCI RCA: DESx 3 m/pRCA 99%. Plan to return for residual LAD. Access RFA PC.   -- Cardiac Catheterization (10/21/24): rota-assisted/IVUS guided FAITH x 3 p/mLAD w/ IABP placed for poor flow in dLAD with imporvement of flow. Right radial access and left femoral access (for IABP)   -- TTE (9/13/24): LVEF 62%, mild LVH, trace TR, trace AI.  -- TTE (10/22/24): entire apex and mid and apical anterior septum are hypokinetic, LVEF 55-60%, GIDD, no pericardial effusion  - Antianginal: Nitro drip 50mcg/min  - DAPT: Aspirin/Plavix  - monitor for worsening chest pain and EKG changes

## 2024-10-24 NOTE — PROGRESS NOTE ADULT - ASSESSMENT
79M, former smoker, w/ PMHx of HTN, HLD, T2DM, 3vCAD (DESx3 RCA, see below), ADRIÁN on CPAP, asthma, BPH and OA who presented to [facility] Cath Lab 10/15/24 for planned rota-assisted PCI of LAD w/ procedure c/b no-reflow w/ final ADINA 1 flow of requiring IABP for coronary perfusion (removed 10/23/24) and upgrade to CCU. Course further c/b left femoral 3x3in hematoma and GLENN V2-V4 accompanied by chest pain on 10/24/24 and started on nitrates and eventually nitro gtt. Patient deemed stable and stepped down to [facility] cardiac telemetry 10/24/24.   79M, former smoker, w/ PMHx of HTN, HLD, T2DM, 3vCAD (DESx3 RCA, see below), ADRIÁN on CPAP, asthma, BPH and OA who presented to [facility] Lab 10/15/24 for planned rota-assisted PCI of LAD s/p rota/IVUS giuded FAITH x 3 p/mLAD w/ procedure c/b no-reflow w/ final ADINA 1 flow of requiring IABP for coronary perfusion (removed 10/23/24) and upgrade to CCU. Course further c/b left femoral 3x3in hematoma and GLENN V2-V4 accompanied by chest pain on 10/24/24 and started on nitrates and eventually nitro gtt. Patient deemed stable and stepped down to [facility] cardiac telemetry 10/24/24.   79M, former smoker, w/ PMHx of HTN, HLD, T2DM, 3vCAD (DESx3 RCA, see below), ADRIÁN on CPAP, asthma, BPH and OA who presented to [facility] Cath Lab 10/21/24 for planned rota-assisted PCI of LAD and now s/p rota/FAITH x 3 p/mLAD w/ procedure c/b no-reflow of dLAD requiring IABP for coronary perfusion (removed 10/23/24) and upgrade to CCU. Course further c/b left femoral 3x3in hematoma (RESOLVED) and GLENN V2-V4 accompanied by chest pain on 10/24/24 and started on nitrates and eventually nitro gtt w/ improvement in pain. Patient deemed stable and stepped down to [facility] cardiac telemetry 10/24/24.

## 2024-10-25 DIAGNOSIS — N39.0 URINARY TRACT INFECTION, SITE NOT SPECIFIED: ICD-10-CM

## 2024-10-25 LAB
ALBUMIN SERPL ELPH-MCNC: 3.8 G/DL — SIGNIFICANT CHANGE UP (ref 3.3–5)
ALP SERPL-CCNC: 48 U/L — SIGNIFICANT CHANGE UP (ref 40–120)
ALT FLD-CCNC: 30 U/L — SIGNIFICANT CHANGE UP (ref 10–45)
ANION GAP SERPL CALC-SCNC: 12 MMOL/L — SIGNIFICANT CHANGE UP (ref 5–17)
APPEARANCE UR: CLEAR — SIGNIFICANT CHANGE UP
AST SERPL-CCNC: 65 U/L — HIGH (ref 10–40)
BACTERIA # UR AUTO: NEGATIVE /HPF — SIGNIFICANT CHANGE UP
BASOPHILS # BLD AUTO: 0.04 K/UL — SIGNIFICANT CHANGE UP (ref 0–0.2)
BASOPHILS NFR BLD AUTO: 0.6 % — SIGNIFICANT CHANGE UP (ref 0–2)
BILIRUB SERPL-MCNC: 0.6 MG/DL — SIGNIFICANT CHANGE UP (ref 0.2–1.2)
BILIRUB UR-MCNC: NEGATIVE — SIGNIFICANT CHANGE UP
BLD GP AB SCN SERPL QL: NEGATIVE — SIGNIFICANT CHANGE UP
BUN SERPL-MCNC: 22 MG/DL — SIGNIFICANT CHANGE UP (ref 7–23)
CALCIUM SERPL-MCNC: 8.9 MG/DL — SIGNIFICANT CHANGE UP (ref 8.4–10.5)
CAST: 0 /LPF — SIGNIFICANT CHANGE UP (ref 0–4)
CHLORIDE SERPL-SCNC: 102 MMOL/L — SIGNIFICANT CHANGE UP (ref 96–108)
CO2 SERPL-SCNC: 25 MMOL/L — SIGNIFICANT CHANGE UP (ref 22–31)
COLOR SPEC: YELLOW — SIGNIFICANT CHANGE UP
CREAT SERPL-MCNC: 1.06 MG/DL — SIGNIFICANT CHANGE UP (ref 0.5–1.3)
DIFF PNL FLD: ABNORMAL
EGFR: 71 ML/MIN/1.73M2 — SIGNIFICANT CHANGE UP
EOSINOPHIL # BLD AUTO: 0.11 K/UL — SIGNIFICANT CHANGE UP (ref 0–0.5)
EOSINOPHIL NFR BLD AUTO: 1.6 % — SIGNIFICANT CHANGE UP (ref 0–6)
GLUCOSE BLDC GLUCOMTR-MCNC: 110 MG/DL — HIGH (ref 70–99)
GLUCOSE BLDC GLUCOMTR-MCNC: 121 MG/DL — HIGH (ref 70–99)
GLUCOSE BLDC GLUCOMTR-MCNC: 143 MG/DL — HIGH (ref 70–99)
GLUCOSE BLDC GLUCOMTR-MCNC: 151 MG/DL — HIGH (ref 70–99)
GLUCOSE SERPL-MCNC: 129 MG/DL — HIGH (ref 70–99)
GLUCOSE UR QL: NEGATIVE MG/DL — SIGNIFICANT CHANGE UP
HCT VFR BLD CALC: 36.5 % — LOW (ref 39–50)
HGB BLD-MCNC: 11.2 G/DL — LOW (ref 13–17)
IMM GRANULOCYTES NFR BLD AUTO: 0.7 % — SIGNIFICANT CHANGE UP (ref 0–0.9)
IRON SATN MFR SERPL: 11 % — LOW (ref 16–55)
IRON SATN MFR SERPL: 29 UG/DL — LOW (ref 45–165)
KETONES UR-MCNC: NEGATIVE MG/DL — SIGNIFICANT CHANGE UP
LEUKOCYTE ESTERASE UR-ACNC: ABNORMAL
LYMPHOCYTES # BLD AUTO: 0.68 K/UL — LOW (ref 1–3.3)
LYMPHOCYTES # BLD AUTO: 9.9 % — LOW (ref 13–44)
MAGNESIUM SERPL-MCNC: 1.8 MG/DL — SIGNIFICANT CHANGE UP (ref 1.6–2.6)
MCHC RBC-ENTMCNC: 25.3 PG — LOW (ref 27–34)
MCHC RBC-ENTMCNC: 30.7 GM/DL — LOW (ref 32–36)
MCV RBC AUTO: 82.4 FL — SIGNIFICANT CHANGE UP (ref 80–100)
MONOCYTES # BLD AUTO: 0.9 K/UL — SIGNIFICANT CHANGE UP (ref 0–0.9)
MONOCYTES NFR BLD AUTO: 13.1 % — SIGNIFICANT CHANGE UP (ref 2–14)
NEUTROPHILS # BLD AUTO: 5.11 K/UL — SIGNIFICANT CHANGE UP (ref 1.8–7.4)
NEUTROPHILS NFR BLD AUTO: 74.1 % — SIGNIFICANT CHANGE UP (ref 43–77)
NITRITE UR-MCNC: NEGATIVE — SIGNIFICANT CHANGE UP
NRBC # BLD: 0 /100 WBCS — SIGNIFICANT CHANGE UP (ref 0–0)
PH UR: 5.5 — SIGNIFICANT CHANGE UP (ref 5–8)
PHOSPHATE SERPL-MCNC: 3.8 MG/DL — SIGNIFICANT CHANGE UP (ref 2.5–4.5)
PLATELET # BLD AUTO: 121 K/UL — LOW (ref 150–400)
POTASSIUM SERPL-MCNC: 3.9 MMOL/L — SIGNIFICANT CHANGE UP (ref 3.5–5.3)
POTASSIUM SERPL-SCNC: 3.9 MMOL/L — SIGNIFICANT CHANGE UP (ref 3.5–5.3)
PROT SERPL-MCNC: 6.6 G/DL — SIGNIFICANT CHANGE UP (ref 6–8.3)
PROT UR-MCNC: SIGNIFICANT CHANGE UP MG/DL
RBC # BLD: 4.43 M/UL — SIGNIFICANT CHANGE UP (ref 4.2–5.8)
RBC # FLD: 13.2 % — SIGNIFICANT CHANGE UP (ref 10.3–14.5)
RBC CASTS # UR COMP ASSIST: 430 /HPF — HIGH (ref 0–4)
RH IG SCN BLD-IMP: POSITIVE — SIGNIFICANT CHANGE UP
SODIUM SERPL-SCNC: 139 MMOL/L — SIGNIFICANT CHANGE UP (ref 135–145)
SP GR SPEC: 1.02 — SIGNIFICANT CHANGE UP (ref 1–1.03)
SPERM AB SPEC-ACNC: PRESENT
SQUAMOUS # UR AUTO: 1 /HPF — SIGNIFICANT CHANGE UP (ref 0–5)
TIBC SERPL-MCNC: 259 UG/DL — SIGNIFICANT CHANGE UP (ref 220–430)
UIBC SERPL-MCNC: 230 UG/DL — SIGNIFICANT CHANGE UP (ref 110–370)
UROBILINOGEN FLD QL: 1 MG/DL — SIGNIFICANT CHANGE UP (ref 0.2–1)
WBC # BLD: 6.89 K/UL — SIGNIFICANT CHANGE UP (ref 3.8–10.5)
WBC # FLD AUTO: 6.89 K/UL — SIGNIFICANT CHANGE UP (ref 3.8–10.5)
WBC UR QL: 7 /HPF — HIGH (ref 0–5)

## 2024-10-25 PROCEDURE — 99233 SBSQ HOSP IP/OBS HIGH 50: CPT

## 2024-10-25 PROCEDURE — 71045 X-RAY EXAM CHEST 1 VIEW: CPT | Mod: 26

## 2024-10-25 PROCEDURE — 93010 ELECTROCARDIOGRAM REPORT: CPT

## 2024-10-25 RX ORDER — DAPAGLIFLOZIN 10 MG/1
10 TABLET, FILM COATED ORAL DAILY
Refills: 0 | Status: DISCONTINUED | OUTPATIENT
Start: 2024-10-25 | End: 2024-10-29

## 2024-10-25 RX ORDER — ISOSORBIDE MONONITRATE 60 MG/1
60 TABLET, EXTENDED RELEASE ORAL
Refills: 0 | Status: DISCONTINUED | OUTPATIENT
Start: 2024-10-25 | End: 2024-10-29

## 2024-10-25 RX ORDER — MAGNESIUM OXIDE 400 MG/1
800 TABLET ORAL ONCE
Refills: 0 | Status: COMPLETED | OUTPATIENT
Start: 2024-10-25 | End: 2024-10-25

## 2024-10-25 RX ORDER — NITROFURANTOIN 25 MG/5ML
100 SUSPENSION ORAL
Refills: 0 | Status: DISCONTINUED | OUTPATIENT
Start: 2024-10-25 | End: 2024-10-29

## 2024-10-25 RX ORDER — NEBIVOLOL 10 MG/1
2.5 TABLET ORAL DAILY
Refills: 0 | Status: DISCONTINUED | OUTPATIENT
Start: 2024-10-25 | End: 2024-10-29

## 2024-10-25 RX ORDER — POTASSIUM CHLORIDE 10 MEQ
20 TABLET, EXTENDED RELEASE ORAL ONCE
Refills: 0 | Status: COMPLETED | OUTPATIENT
Start: 2024-10-25 | End: 2024-10-25

## 2024-10-25 RX ADMIN — NITROFURANTOIN 100 MILLIGRAM(S): 25 SUSPENSION ORAL at 14:18

## 2024-10-25 RX ADMIN — ISOSORBIDE MONONITRATE 60 MILLIGRAM(S): 60 TABLET, EXTENDED RELEASE ORAL at 21:30

## 2024-10-25 RX ADMIN — Medication 81 MILLIGRAM(S): at 12:31

## 2024-10-25 RX ADMIN — HEPARIN SODIUM 5000 UNIT(S): 10000 INJECTION INTRAVENOUS; SUBCUTANEOUS at 21:30

## 2024-10-25 RX ADMIN — ISOSORBIDE MONONITRATE 60 MILLIGRAM(S): 60 TABLET, EXTENDED RELEASE ORAL at 10:45

## 2024-10-25 RX ADMIN — MAGNESIUM OXIDE 800 MILLIGRAM(S): 400 TABLET ORAL at 09:59

## 2024-10-25 RX ADMIN — HEPARIN SODIUM 5000 UNIT(S): 10000 INJECTION INTRAVENOUS; SUBCUTANEOUS at 05:50

## 2024-10-25 RX ADMIN — Medication 15 MICROGRAM(S)/MIN: at 05:52

## 2024-10-25 RX ADMIN — Medication 80 MILLIGRAM(S): at 21:30

## 2024-10-25 RX ADMIN — NEBIVOLOL 2.5 MILLIGRAM(S): 10 TABLET ORAL at 06:53

## 2024-10-25 RX ADMIN — Medication 12.5 MILLIGRAM(S): at 13:54

## 2024-10-25 RX ADMIN — LOSARTAN POTASSIUM 25 MILLIGRAM(S): 25 TABLET ORAL at 09:59

## 2024-10-25 RX ADMIN — HEPARIN SODIUM 5000 UNIT(S): 10000 INJECTION INTRAVENOUS; SUBCUTANEOUS at 13:54

## 2024-10-25 RX ADMIN — DAPAGLIFLOZIN 10 MILLIGRAM(S): 10 TABLET, FILM COATED ORAL at 13:54

## 2024-10-25 RX ADMIN — Medication 20 MILLIEQUIVALENT(S): at 09:59

## 2024-10-25 RX ADMIN — CLOPIDOGREL 75 MILLIGRAM(S): 75 TABLET ORAL at 12:31

## 2024-10-25 NOTE — PROGRESS NOTE ADULT - SUBJECTIVE AND OBJECTIVE BOX
Cardiology PA Adult Progress Note    SUBJECTIVE ASSESSMENT:  	  MEDICATIONS:  isosorbide   mononitrate ER Tablet (IMDUR) 60 milliGRAM(s) Oral <User Schedule>  losartan 25 milliGRAM(s) Oral every 24 hours  nebivolol 2.5 milliGRAM(s) Oral daily  spironolactone 12.5 milliGRAM(s) Oral daily            atorvastatin 80 milliGRAM(s) Oral at bedtime  dapagliflozin 10 milliGRAM(s) Oral daily  dextrose 50% Injectable 25 Gram(s) IV Push once  dextrose 50% Injectable 12.5 Gram(s) IV Push once  dextrose 50% Injectable 25 Gram(s) IV Push once  dextrose Oral Gel 15 Gram(s) Oral once PRN  glucagon  Injectable 1 milliGRAM(s) IntraMuscular once  insulin lispro (ADMELOG) corrective regimen sliding scale   SubCutaneous Before meals and at bedtime    aspirin enteric coated 81 milliGRAM(s) Oral daily  chlorhexidine 2% Cloths 1 Application(s) Topical <User Schedule>  clopidogrel Tablet 75 milliGRAM(s) Oral daily  dextrose 5%. 1000 milliLiter(s) IV Continuous <Continuous>  dextrose 5%. 1000 milliLiter(s) IV Continuous <Continuous>  heparin   Injectable 5000 Unit(s) SubCutaneous every 8 hours    	  VITAL SIGNS:  T(C): 36.7 (10-25-24 @ 05:49), Max: 36.8 (10-24-24 @ 16:40)  HR: 68 (10-25-24 @ 09:42) (62 - 77)  BP: 106/63 (10-25-24 @ 09:42) (97/64 - 140/80)  RR: 19 (10-25-24 @ 09:42) (18 - 19)  SpO2: 96% (10-25-24 @ 09:42) (94% - 98%)  Wt(kg): --    I&O's Summary    24 Oct 2024 07:01  -  25 Oct 2024 07:00  --------------------------------------------------------  IN: 0 mL / OUT: 650 mL / NET: -650 mL                                           PHYSICAL EXAM:  Appearance: Normal	  HEENT: Normal oral mucosa, PERRL, EOMI	  Neck: Supple, + JVD/ - JVD; ___ Carotid Bruit   Cardiovascular: Normal S1 S2, No murmurs  Respiratory: Lungs clear to auscultation/Decreased Breath Sounds/No Rales, Rhonchi, Wheezing	  Gastrointestinal:  Soft, Non-tender, + BS	  Skin: No rashes, No ecchymoses, No cyanosis  Extremities: Normal range of motion, No clubbing, cyanosis or edema  Vascular: Peripheral pulses palpable 2+ bilaterally  Neurologic: Non-focal  Psychiatry: A & O x 3, Mood & affect appropriate    LABS:	 	                                  11.2   6.89  )-----------( 121      ( 25 Oct 2024 05:30 )             36.5     10-25    139  |  102  |  22  ----------------------------<  129[H]  3.9   |  25  |  1.06    Ca    8.9      25 Oct 2024 05:30  Phos  3.8     10-25  Mg     1.8     10-25    TPro  6.6  /  Alb  3.8  /  TBili  0.6  /  DBili  x   /  AST  65[H]  /  ALT  30  /  AlkPhos  48  10-25    proBNP:   Lipid Profile:   HgA1c:   TSH:    Cardiology PA Adult Progress Note    SUBJECTIVE ASSESSMENT: Patient seen and examined at bedside with son and partner present. He reports no chest pain on NTG gtt. He denies SOB, palpitations, dizziness. Pt reports one episode of hematuria without dysuria/urinary sx.   	  MEDICATIONS:  isosorbide   mononitrate ER Tablet (IMDUR) 60 milliGRAM(s) Oral <User Schedule>  losartan 25 milliGRAM(s) Oral every 24 hours  nebivolol 2.5 milliGRAM(s) Oral daily  spironolactone 12.5 milliGRAM(s) Oral daily  atorvastatin 80 milliGRAM(s) Oral at bedtime  dapagliflozin 10 milliGRAM(s) Oral daily  dextrose 50% Injectable 25 Gram(s) IV Push once  dextrose 50% Injectable 12.5 Gram(s) IV Push once  dextrose 50% Injectable 25 Gram(s) IV Push once  dextrose Oral Gel 15 Gram(s) Oral once PRN  glucagon  Injectable 1 milliGRAM(s) IntraMuscular once  insulin lispro (ADMELOG) corrective regimen sliding scale   SubCutaneous Before meals and at bedtime  aspirin enteric coated 81 milliGRAM(s) Oral daily  chlorhexidine 2% Cloths 1 Application(s) Topical <User Schedule>  clopidogrel Tablet 75 milliGRAM(s) Oral daily  dextrose 5%. 1000 milliLiter(s) IV Continuous <Continuous>  dextrose 5%. 1000 milliLiter(s) IV Continuous <Continuous>  heparin   Injectable 5000 Unit(s) SubCutaneous every 8 hours    	  VITAL SIGNS:  T(C): 36.7 (10-25-24 @ 05:49), Max: 36.8 (10-24-24 @ 16:40)  HR: 68 (10-25-24 @ 09:42) (62 - 77)  BP: 106/63 (10-25-24 @ 09:42) (97/64 - 140/80)  RR: 19 (10-25-24 @ 09:42) (18 - 19)  SpO2: 96% (10-25-24 @ 09:42) (94% - 98%)  Wt(kg): --    I&O's Summary    24 Oct 2024 07:01  -  25 Oct 2024 07:00  --------------------------------------------------------  IN: 0 mL / OUT: 650 mL / NET: -650 mL                                           PHYSICAL EXAM:  Appearance: Normal	  HEENT: Normal oral mucosa, PERRL, EOMI	  Neck: Supple,  - JVD; no Carotid Bruit   Cardiovascular: Normal S1 S2, No murmurs  Respiratory: Lungs clear to auscultation/No Rales, Rhonchi, Wheezing	  Gastrointestinal:  Soft, Non-tender, + BS	  Skin: No rashes, No ecchymoses, No cyanosis  Extremities: Normal range of motion, No clubbing, cyanosis or edema  Vascular: Peripheral pulses palpable 2+ bilaterally  Neurologic: Non-focal  Psychiatry: A & O x 3, Mood & affect appropriate    LABS:	 	                        11.2   6.89  )-----------( 121      ( 25 Oct 2024 05:30 )             36.5     10-25    139  |  102  |  22  ----------------------------<  129[H]  3.9   |  25  |  1.06    Ca    8.9      25 Oct 2024 05:30  Phos  3.8     10-25  Mg     1.8     10-25    TPro  6.6  /  Alb  3.8  /  TBili  0.6  /  DBili  x   /  AST  65[H]  /  ALT  30  /  AlkPhos  48  10-25    proBNP:   Lipid Profile:   HgA1c:   TSH:

## 2024-10-25 NOTE — PROGRESS NOTE ADULT - PROBLEM SELECTOR PLAN 5
Home regimen: Symbicort 160 mg- 4.5 mcg x 2 puff QD      F: Not indicated  E: Replete if K<4 or Mag<2  N: DASH Diet  VTEppx: Heparin SQ  Dispo: when chest pain improves  PT: ordered Home regimen: Symbicort 160 mg- 4.5 mcg x 2 puff QD      F: Not indicated  E: Replete if K<4 or Mag<2  N: DASH Diet  VTEppx: Heparin SQ  Dispo: when chest pain improves  PT: consulted

## 2024-10-25 NOTE — PROGRESS NOTE ADULT - ASSESSMENT
79M, former smoker, w/ PMHx of HTN, HLD, T2DM, 3vCAD (DESx3 RCA, see below), ADRIÁN on CPAP, asthma, BPH and OA who presented to [facility] 10/21/24 for planned rota-assisted PCI of LAD and now s/p rota/FAITH x 3 p/mLAD w/ procedure c/b no-reflow of dLAD requiring IABP for coronary perfusion (removed 10/23/24) and upgrade to CCU. Course further c/b left femoral 3x3in hematoma (RESOLVED) and GLENN V2-V4 accompanied by chest pain on 10/24/24 and started on nitrates and eventually nitro gtt w/ improvement in pain. Patient deemed stable and stepped down to [facility] cardiac telemetry 10/24/24.   79M, former smoker, w/ PMHx of HTN, HLD, T2DM, 3vCAD (DESx3 RCA, see below), ADRIÁN on CPAP, asthma, BPH and OA who presented to [facility] 10/21/24 for planned rota-assisted PCI of LAD and now s/p rota/FAITH x 3 p/mLAD w/ procedure c/b no-reflow of dLAD requiring IABP for coronary perfusion (removed 10/23/24) and upgrade to CCU. Course further c/b left femoral 3x3in hematoma (RESOLVED) and GLENN V2-V4 accompanied by chest pain on 10/24/24 and started on nitrates and eventually nitro gtt w/ improvement in pain. Patient deemed stable and stepped down to [facility] cardiac telemetry, plan to add Imdur and d/c NTG gtt.

## 2024-10-25 NOTE — PROGRESS NOTE ADULT - PROBLEM SELECTOR PLAN 1
-- Cardiac cath (9/20/24) @ [facility]: staged PCI RCA: DESx 3 m/pRCA 99%. Plan to return for residual LAD. Access RFA PC.   -- Cardiac Catheterization (10/21/24): rota-assisted/IVUS guided FAITH x 3 p/mLAD w/ IABP placed for poor flow in dLAD with improvement of flow. Right radial access and left femoral access (for IABP)   -- TTE (9/13/24): LVEF 62%, mild LVH, trace TR, trace AI.  -- TTE (10/22/24): entire apex and mid and apical anterior septum are hypokinetic, LVEF 55-60%, GIDD, no pericardial effusion  - Antianginal: Nitro drip 50mcg/min d/c, now on Imdur 60 mg BID - still remains CP free  - DAPT: Aspirin/Plavix  - monitor for worsening chest pain and EKG changes - EKG this AM improving  - Plan for limited TTE in AM -- Cardiac cath (9/20/24) @ [facility]: staged PCI RCA: DESx 3 m/pRCA 99%. Plan to return for residual LAD. Access RFA PC.   -- Cardiac Catheterization (10/21/24): rota-assisted/IVUS guided FAITH x 3 p/mLAD w/ IABP placed for poor flow in dLAD with improvement of flow. Right radial access and left femoral access (for IABP)   -- TTE (9/13/24): LVEF 62%, mild LVH, trace TR, trace AI.  -- TTE (10/22/24): entire apex and mid and apical anterior septum are hypokinetic, LVEF 55-60%, GIDD, no pericardial effusion  - Antianginal: Nitro drip 50mcg/min d/c, now on Imdur 60 mg BID - still remains CP free  - DAPT: Aspirin/Plavix  - monitor for worsening chest pain and EKG changes - EKG this AM improving  - Plan for limited TTE in AM to assess for wall motion and LVEF

## 2024-10-25 NOTE — PROGRESS NOTE ADULT - PROBLEM SELECTOR PLAN 2
Pt reporting hematuria this morning, on UA yesterday + leukocyte esterase, + blood, denies dysuria, urgency, frequency  - starting macrobid 100 mg BID x 7 days  - ordered UA with reflex culture

## 2024-10-26 LAB
ALBUMIN SERPL ELPH-MCNC: 3.2 G/DL — LOW (ref 3.3–5)
ALP SERPL-CCNC: 46 U/L — SIGNIFICANT CHANGE UP (ref 40–120)
ALT FLD-CCNC: 26 U/L — SIGNIFICANT CHANGE UP (ref 10–45)
ANION GAP SERPL CALC-SCNC: 10 MMOL/L — SIGNIFICANT CHANGE UP (ref 5–17)
AST SERPL-CCNC: 41 U/L — HIGH (ref 10–40)
BILIRUB SERPL-MCNC: 0.6 MG/DL — SIGNIFICANT CHANGE UP (ref 0.2–1.2)
BUN SERPL-MCNC: 19 MG/DL — SIGNIFICANT CHANGE UP (ref 7–23)
CALCIUM SERPL-MCNC: 8.6 MG/DL — SIGNIFICANT CHANGE UP (ref 8.4–10.5)
CHLORIDE SERPL-SCNC: 102 MMOL/L — SIGNIFICANT CHANGE UP (ref 96–108)
CO2 SERPL-SCNC: 24 MMOL/L — SIGNIFICANT CHANGE UP (ref 22–31)
CREAT SERPL-MCNC: 1.11 MG/DL — SIGNIFICANT CHANGE UP (ref 0.5–1.3)
EGFR: 67 ML/MIN/1.73M2 — SIGNIFICANT CHANGE UP
GLUCOSE BLDC GLUCOMTR-MCNC: 101 MG/DL — HIGH (ref 70–99)
GLUCOSE BLDC GLUCOMTR-MCNC: 112 MG/DL — HIGH (ref 70–99)
GLUCOSE BLDC GLUCOMTR-MCNC: 124 MG/DL — HIGH (ref 70–99)
GLUCOSE BLDC GLUCOMTR-MCNC: 96 MG/DL — SIGNIFICANT CHANGE UP (ref 70–99)
GLUCOSE SERPL-MCNC: 97 MG/DL — SIGNIFICANT CHANGE UP (ref 70–99)
HCT VFR BLD CALC: 32.8 % — LOW (ref 39–50)
HGB BLD-MCNC: 10.3 G/DL — LOW (ref 13–17)
MAGNESIUM SERPL-MCNC: 2.1 MG/DL — SIGNIFICANT CHANGE UP (ref 1.6–2.6)
MCHC RBC-ENTMCNC: 26.3 PG — LOW (ref 27–34)
MCHC RBC-ENTMCNC: 31.4 GM/DL — LOW (ref 32–36)
MCV RBC AUTO: 83.9 FL — SIGNIFICANT CHANGE UP (ref 80–100)
NRBC # BLD: 0 /100 WBCS — SIGNIFICANT CHANGE UP (ref 0–0)
PLATELET # BLD AUTO: 123 K/UL — LOW (ref 150–400)
POTASSIUM SERPL-MCNC: 4.3 MMOL/L — SIGNIFICANT CHANGE UP (ref 3.5–5.3)
POTASSIUM SERPL-SCNC: 4.3 MMOL/L — SIGNIFICANT CHANGE UP (ref 3.5–5.3)
PROT SERPL-MCNC: 6 G/DL — SIGNIFICANT CHANGE UP (ref 6–8.3)
RBC # BLD: 3.91 M/UL — LOW (ref 4.2–5.8)
RBC # FLD: 13.3 % — SIGNIFICANT CHANGE UP (ref 10.3–14.5)
SODIUM SERPL-SCNC: 136 MMOL/L — SIGNIFICANT CHANGE UP (ref 135–145)
WBC # BLD: 6.53 K/UL — SIGNIFICANT CHANGE UP (ref 3.8–10.5)
WBC # FLD AUTO: 6.53 K/UL — SIGNIFICANT CHANGE UP (ref 3.8–10.5)

## 2024-10-26 PROCEDURE — 99232 SBSQ HOSP IP/OBS MODERATE 35: CPT

## 2024-10-26 RX ADMIN — NITROFURANTOIN 100 MILLIGRAM(S): 25 SUSPENSION ORAL at 17:49

## 2024-10-26 RX ADMIN — ISOSORBIDE MONONITRATE 60 MILLIGRAM(S): 60 TABLET, EXTENDED RELEASE ORAL at 21:13

## 2024-10-26 RX ADMIN — DAPAGLIFLOZIN 10 MILLIGRAM(S): 10 TABLET, FILM COATED ORAL at 12:00

## 2024-10-26 RX ADMIN — HEPARIN SODIUM 5000 UNIT(S): 10000 INJECTION INTRAVENOUS; SUBCUTANEOUS at 13:47

## 2024-10-26 RX ADMIN — HEPARIN SODIUM 5000 UNIT(S): 10000 INJECTION INTRAVENOUS; SUBCUTANEOUS at 21:10

## 2024-10-26 RX ADMIN — Medication 80 MILLIGRAM(S): at 21:11

## 2024-10-26 RX ADMIN — HEPARIN SODIUM 5000 UNIT(S): 10000 INJECTION INTRAVENOUS; SUBCUTANEOUS at 05:19

## 2024-10-26 RX ADMIN — NITROFURANTOIN 100 MILLIGRAM(S): 25 SUSPENSION ORAL at 05:18

## 2024-10-26 RX ADMIN — Medication 81 MILLIGRAM(S): at 12:00

## 2024-10-26 RX ADMIN — CLOPIDOGREL 75 MILLIGRAM(S): 75 TABLET ORAL at 12:00

## 2024-10-26 RX ADMIN — NEBIVOLOL 2.5 MILLIGRAM(S): 10 TABLET ORAL at 05:18

## 2024-10-26 RX ADMIN — LOSARTAN POTASSIUM 25 MILLIGRAM(S): 25 TABLET ORAL at 13:48

## 2024-10-26 RX ADMIN — ISOSORBIDE MONONITRATE 60 MILLIGRAM(S): 60 TABLET, EXTENDED RELEASE ORAL at 12:01

## 2024-10-26 RX ADMIN — Medication 12.5 MILLIGRAM(S): at 05:18

## 2024-10-26 NOTE — PROGRESS NOTE ADULT - PROBLEM SELECTOR PLAN 1
-- Cardiac cath (9/20/24) @ [facility]: staged PCI RCA: DESx 3 m/pRCA 99%. Plan to return for residual LAD. Access RFA PC.   -- Cardiac Catheterization (10/21/24): rota-assisted/IVUS guided FAITH x 3 p/mLAD w/ IABP placed for poor flow in dLAD with improvement of flow. Right radial access and left femoral access (for IABP)   -- TTE (9/13/24): LVEF 62%, mild LVH, trace TR, trace AI.  -- TTE (10/22/24): entire apex and mid and apical anterior septum are hypokinetic, LVEF 55-60%, GIDD, no pericardial effusion  - Antianginal: s/p Nitro drip 50mcg/min, now on Imdur 60 mg BID - still remains CP free  - DAPT: Aspirin/Plavix  - Plan for limited TTE today to assess for wall motion and LVEF

## 2024-10-26 NOTE — PROGRESS NOTE ADULT - SUBJECTIVE AND OBJECTIVE BOX
Cardiology PA Adult Progress Note    SUBJECTIVE ASSESSMENT:  	  MEDICATIONS:  isosorbide   mononitrate ER Tablet (IMDUR) 60 milliGRAM(s) Oral <User Schedule>  losartan 25 milliGRAM(s) Oral every 24 hours  nebivolol 2.5 milliGRAM(s) Oral daily  spironolactone 12.5 milliGRAM(s) Oral daily  nitrofurantoin monohydrate/macrocrystals (MACROBID) 100 milliGRAM(s) Oral two times a day  atorvastatin 80 milliGRAM(s) Oral at bedtime  dapagliflozin 10 milliGRAM(s) Oral daily  dextrose 50% Injectable 25 Gram(s) IV Push once  dextrose 50% Injectable 12.5 Gram(s) IV Push once  dextrose 50% Injectable 25 Gram(s) IV Push once  dextrose Oral Gel 15 Gram(s) Oral once PRN  glucagon  Injectable 1 milliGRAM(s) IntraMuscular once  insulin lispro (ADMELOG) corrective regimen sliding scale   SubCutaneous Before meals and at bedtime  aspirin enteric coated 81 milliGRAM(s) Oral daily  chlorhexidine 2% Cloths 1 Application(s) Topical <User Schedule>  clopidogrel Tablet 75 milliGRAM(s) Oral daily  dextrose 5%. 1000 milliLiter(s) IV Continuous <Continuous>  dextrose 5%. 1000 milliLiter(s) IV Continuous <Continuous>  heparin   Injectable 5000 Unit(s) SubCutaneous every 8 hours    	  VITAL SIGNS:  T(C): 37 (10-26-24 @ 05:16), Max: 37 (10-26-24 @ 05:16)  HR: 68 (10-26-24 @ 05:16) (68 - 89)  BP: 108/64 (10-26-24 @ 05:16) (98/63 - 108/64)  RR: 18 (10-26-24 @ 05:16) (17 - 19)  SpO2: 97% (10-26-24 @ 05:16) (96% - 99%)  Wt(kg): --    I&O's Summary    25 Oct 2024 07:01  -  26 Oct 2024 07:00  --------------------------------------------------------  IN: 480 mL / OUT: 450 mL / NET: 30 mL                                           PHYSICAL EXAM:  Appearance: Normal	  HEENT: Normal oral mucosa, PERRL, EOMI	  Neck: Supple, + JVD/ - JVD; ___ Carotid Bruit   Cardiovascular: Normal S1 S2, No murmurs  Respiratory: Lungs clear to auscultation/Decreased Breath Sounds/No Rales, Rhonchi, Wheezing	  Gastrointestinal:  Soft, Non-tender, + BS	  Skin: No rashes, No ecchymoses, No cyanosis  Extremities: Normal range of motion, No clubbing, cyanosis or edema  Vascular: Peripheral pulses palpable 2+ bilaterally  Neurologic: Non-focal  Psychiatry: A & O x 3, Mood & affect appropriate    LABS:	 	             10.3   6.53  )-----------( 123      ( 26 Oct 2024 06:59 )             32.8     10-26    136  |  102  |  19  ----------------------------<  97  4.3   |  24  |  1.11    Ca    8.6      26 Oct 2024 06:59  Phos  3.8     10-25  Mg     2.1     10-26    TPro  6.0  /  Alb  3.2[L]  /  TBili  0.6  /  DBili  x   /  AST  41[H]  /  ALT  26  /  AlkPhos  46  10-26    proBNP:   Lipid Profile:   HgA1c:   TSH:    Cardiology PA Adult Progress Note    SUBJECTIVE ASSESSMENT: Patient seen and examined at bedside, he was laying comfortably in bed eating breakfast. He reports feeling better today and denies any chest pain off NTG gtt.   	  MEDICATIONS:  isosorbide   mononitrate ER Tablet (IMDUR) 60 milliGRAM(s) Oral <User Schedule>  losartan 25 milliGRAM(s) Oral every 24 hours  nebivolol 2.5 milliGRAM(s) Oral daily  spironolactone 12.5 milliGRAM(s) Oral daily  nitrofurantoin monohydrate/macrocrystals (MACROBID) 100 milliGRAM(s) Oral two times a day  atorvastatin 80 milliGRAM(s) Oral at bedtime  dapagliflozin 10 milliGRAM(s) Oral daily  dextrose 50% Injectable 25 Gram(s) IV Push once  dextrose 50% Injectable 12.5 Gram(s) IV Push once  dextrose 50% Injectable 25 Gram(s) IV Push once  dextrose Oral Gel 15 Gram(s) Oral once PRN  glucagon  Injectable 1 milliGRAM(s) IntraMuscular once  insulin lispro (ADMELOG) corrective regimen sliding scale   SubCutaneous Before meals and at bedtime  aspirin enteric coated 81 milliGRAM(s) Oral daily  chlorhexidine 2% Cloths 1 Application(s) Topical <User Schedule>  clopidogrel Tablet 75 milliGRAM(s) Oral daily  dextrose 5%. 1000 milliLiter(s) IV Continuous <Continuous>  dextrose 5%. 1000 milliLiter(s) IV Continuous <Continuous>  heparin   Injectable 5000 Unit(s) SubCutaneous every 8 hours    	  VITAL SIGNS:  T(C): 37 (10-26-24 @ 05:16), Max: 37 (10-26-24 @ 05:16)  HR: 68 (10-26-24 @ 05:16) (68 - 89)  BP: 108/64 (10-26-24 @ 05:16) (98/63 - 108/64)  RR: 18 (10-26-24 @ 05:16) (17 - 19)  SpO2: 97% (10-26-24 @ 05:16) (96% - 99%)  Wt(kg): --    I&O's Summary    25 Oct 2024 07:01  -  26 Oct 2024 07:00  --------------------------------------------------------  IN: 480 mL / OUT: 450 mL / NET: 30 mL                                           PHYSICAL EXAM:  Appearance: Normal	  HEENT: Normal oral mucosa, PERRL, EOMI	  Neck: Supple, + JVD/ - JVD; ___ Carotid Bruit   Cardiovascular: Normal S1 S2, No murmurs  Respiratory: Lungs clear to auscultation/Decreased Breath Sounds/No Rales, Rhonchi, Wheezing	  Gastrointestinal:  Soft, Non-tender, + BS	  Skin: No rashes, No ecchymoses, No cyanosis  Extremities: Normal range of motion, No clubbing, cyanosis or edema  Vascular: Peripheral pulses palpable 2+ bilaterally  Neurologic: Non-focal  Psychiatry: A & O x 3, Mood & affect appropriate    LABS:	 	             10.3   6.53  )-----------( 123      ( 26 Oct 2024 06:59 )             32.8     10-26    136  |  102  |  19  ----------------------------<  97  4.3   |  24  |  1.11    Ca    8.6      26 Oct 2024 06:59  Phos  3.8     10-25  Mg     2.1     10-26    TPro  6.0  /  Alb  3.2[L]  /  TBili  0.6  /  DBili  x   /  AST  41[H]  /  ALT  26  /  AlkPhos  46  10-26    proBNP:   Lipid Profile:   HgA1c:   TSH:    Cardiology PA Adult Progress Note    SUBJECTIVE ASSESSMENT: Patient seen and examined at bedside, he was laying comfortably in bed eating breakfast. He reports feeling better today and denies any chest pain off NTG gtt. Patient states he did not see marian red blood in his urine since the episode yesterday morning. He denies any SOB, palpitations, lightheadedness.   	  MEDICATIONS:  isosorbide   mononitrate ER Tablet (IMDUR) 60 milliGRAM(s) Oral <User Schedule>  losartan 25 milliGRAM(s) Oral every 24 hours  nebivolol 2.5 milliGRAM(s) Oral daily  spironolactone 12.5 milliGRAM(s) Oral daily  nitrofurantoin monohydrate/macrocrystals (MACROBID) 100 milliGRAM(s) Oral two times a day  atorvastatin 80 milliGRAM(s) Oral at bedtime  dapagliflozin 10 milliGRAM(s) Oral daily  dextrose 50% Injectable 25 Gram(s) IV Push once  dextrose 50% Injectable 12.5 Gram(s) IV Push once  dextrose 50% Injectable 25 Gram(s) IV Push once  dextrose Oral Gel 15 Gram(s) Oral once PRN  glucagon  Injectable 1 milliGRAM(s) IntraMuscular once  insulin lispro (ADMELOG) corrective regimen sliding scale   SubCutaneous Before meals and at bedtime  aspirin enteric coated 81 milliGRAM(s) Oral daily  chlorhexidine 2% Cloths 1 Application(s) Topical <User Schedule>  clopidogrel Tablet 75 milliGRAM(s) Oral daily  dextrose 5%. 1000 milliLiter(s) IV Continuous <Continuous>  dextrose 5%. 1000 milliLiter(s) IV Continuous <Continuous>  heparin   Injectable 5000 Unit(s) SubCutaneous every 8 hours    	  VITAL SIGNS:  T(C): 37 (10-26-24 @ 05:16), Max: 37 (10-26-24 @ 05:16)  HR: 68 (10-26-24 @ 05:16) (68 - 89)  BP: 108/64 (10-26-24 @ 05:16) (98/63 - 108/64)  RR: 18 (10-26-24 @ 05:16) (17 - 19)  SpO2: 97% (10-26-24 @ 05:16) (96% - 99%)  Wt(kg): --    I&O's Summary    25 Oct 2024 07:01  -  26 Oct 2024 07:00  --------------------------------------------------------  IN: 480 mL / OUT: 450 mL / NET: 30 mL                                           PHYSICAL EXAM:  Appearance: Normal	  HEENT: Normal oral mucosa, PERRL, EOMI	  Neck: Supple, - JVD; no Carotid Bruit   Cardiovascular: Normal S1 S2, No murmurs  Respiratory: Lungs clear to auscultation/No Rales, Rhonchi, Wheezing	  Gastrointestinal:  Soft, Non-tender, + BS	  Skin: No rashes, No ecchymoses, No cyanosis  Extremities: Normal range of motion, No clubbing, cyanosis or edema  Vascular: Peripheral pulses palpable 2+ bilaterally  Neurologic: Non-focal  Psychiatry: A & O x 3, Mood & affect appropriate    LABS:	 	             10.3   6.53  )-----------( 123      ( 26 Oct 2024 06:59 )             32.8     10-26    136  |  102  |  19  ----------------------------<  97  4.3   |  24  |  1.11    Ca    8.6      26 Oct 2024 06:59  Phos  3.8     10-25  Mg     2.1     10-26    TPro  6.0  /  Alb  3.2[L]  /  TBili  0.6  /  DBili  x   /  AST  41[H]  /  ALT  26  /  AlkPhos  46  10-26    proBNP:   Lipid Profile:   HgA1c:   TSH:

## 2024-10-26 NOTE — PROGRESS NOTE ADULT - ASSESSMENT
79M, former smoker, w/ PMHx of HTN, HLD, T2DM, 3vCAD (DESx3 RCA, see below), ADRIÁN on CPAP, asthma, BPH and OA who presented to [facility] 10/21/24 for planned rota-assisted PCI of LAD and now s/p rota/FAITH x 3 p/mLAD w/ procedure c/b no-reflow of dLAD requiring IABP for coronary perfusion (removed 10/23/24) and upgrade to CCU. Course further c/b left femoral 3x3in hematoma (RESOLVED) and GLENN V2-V4 accompanied by chest pain on 10/24/24 and started on nitrates and eventually nitro gtt w/ improvement in pain. Patient deemed stable and stepped down to [facility] cardiac telemetry. NTG gtt d/c and started on Imdur 60 BID, no CP. Plan for limited TTE today to assess LVEF and wall motion, PT consult, continue UTI treatment.

## 2024-10-26 NOTE — PHYSICAL THERAPY INITIAL EVALUATION ADULT - GENERAL OBSERVATIONS, REHAB EVAL
Patient encountered semi-supine in bed in no apparent distress, +heplock +tele. Agreeable to PT davis.

## 2024-10-26 NOTE — PROGRESS NOTE ADULT - PROBLEM SELECTOR PLAN 2
Pt reporting hematuria yesterday morning, on UA 10/24 + leukocyte esterase, + blood, denies dysuria, urgency, frequency  - UA 10/25 large blood (color still yellow), small leuk esterase, elevated RBC and WBC. Reflex culture pending  - in urinal at bedside urine yellow in color  - started macrobid 100 mg BID x 7 days Pt reporting hematuria yesterday morning, on UA 10/24 + leukocyte esterase, + blood, denies dysuria, urgency, frequency  - UA 10/25 large blood (color still yellow), small leuk esterase, elevated RBC and WBC. Reflex culture pending  - in urinal at bedside urine yellow in color  - continue macrobid 100 mg BID x 7 days

## 2024-10-26 NOTE — PHYSICAL THERAPY INITIAL EVALUATION ADULT - ADDITIONAL COMMENTS
Patient lives alone in [facility] +1STE. Upon discharge, patient will be staying with partner in their [facility]STE +1FoS within. PTA, pt was independent with all ADLs and functional mobility, without the use of any AD.

## 2024-10-26 NOTE — PROGRESS NOTE ADULT - PROBLEM SELECTOR PLAN 5
Home regimen: Symbicort 160 mg- 4.5 mcg x 2 puff QD      F: Not indicated  E: Replete if K<4 or Mag<2  N: DASH Diet  VTEppx: Heparin SQ  Dispo: pending clinical course  PT: rec cardiac rehab

## 2024-10-26 NOTE — PHYSICAL THERAPY INITIAL EVALUATION ADULT - PERTINENT HX OF CURRENT PROBLEM, REHAB EVAL
Patient is 79M, former smoker, w/ PMHx of HTN, HLD, T2DM, 3vCAD (DESx3 RCA, see below), ADRIÁN on CPAP, asthma, BPH and OA who presented to [facility] 10/21/24 for planned rota-assisted PCI of LAD and now s/p rota/FAITH x 3 p/mLAD w/ procedure c/b no-reflow of dLAD requiring IABP for coronary perfusion (removed 10/23/24) and upgrade to CCU. Course further c/b left femoral 3x3in hematoma (RESOLVED) and GLENN V2-V4 accompanied by chest pain on 10/24/24 and started on nitrates and eventually nitro gtt w/ improvement in pain. Patient deemed stable and stepped down to [facility] cardiac telemetry, plan to add Imdur and d/c NTG gtt.

## 2024-10-27 LAB
ALBUMIN SERPL ELPH-MCNC: 3.6 G/DL — SIGNIFICANT CHANGE UP (ref 3.3–5)
ALP SERPL-CCNC: 51 U/L — SIGNIFICANT CHANGE UP (ref 40–120)
ALT FLD-CCNC: 25 U/L — SIGNIFICANT CHANGE UP (ref 10–45)
ANION GAP SERPL CALC-SCNC: 12 MMOL/L — SIGNIFICANT CHANGE UP (ref 5–17)
AST SERPL-CCNC: 33 U/L — SIGNIFICANT CHANGE UP (ref 10–40)
BILIRUB SERPL-MCNC: 0.7 MG/DL — SIGNIFICANT CHANGE UP (ref 0.2–1.2)
BUN SERPL-MCNC: 20 MG/DL — SIGNIFICANT CHANGE UP (ref 7–23)
CALCIUM SERPL-MCNC: 8.6 MG/DL — SIGNIFICANT CHANGE UP (ref 8.4–10.5)
CHLORIDE SERPL-SCNC: 103 MMOL/L — SIGNIFICANT CHANGE UP (ref 96–108)
CO2 SERPL-SCNC: 22 MMOL/L — SIGNIFICANT CHANGE UP (ref 22–31)
CREAT SERPL-MCNC: 1.16 MG/DL — SIGNIFICANT CHANGE UP (ref 0.5–1.3)
EGFR: 63 ML/MIN/1.73M2 — SIGNIFICANT CHANGE UP
GLUCOSE BLDC GLUCOMTR-MCNC: 101 MG/DL — HIGH (ref 70–99)
GLUCOSE BLDC GLUCOMTR-MCNC: 113 MG/DL — HIGH (ref 70–99)
GLUCOSE BLDC GLUCOMTR-MCNC: 135 MG/DL — HIGH (ref 70–99)
GLUCOSE BLDC GLUCOMTR-MCNC: 69 MG/DL — LOW (ref 70–99)
GLUCOSE BLDC GLUCOMTR-MCNC: 80 MG/DL — SIGNIFICANT CHANGE UP (ref 70–99)
GLUCOSE SERPL-MCNC: 70 MG/DL — SIGNIFICANT CHANGE UP (ref 70–99)
HCT VFR BLD CALC: 32.4 % — LOW (ref 39–50)
HGB BLD-MCNC: 10.1 G/DL — LOW (ref 13–17)
MAGNESIUM SERPL-MCNC: 2.1 MG/DL — SIGNIFICANT CHANGE UP (ref 1.6–2.6)
MCHC RBC-ENTMCNC: 26.2 PG — LOW (ref 27–34)
MCHC RBC-ENTMCNC: 31.2 GM/DL — LOW (ref 32–36)
MCV RBC AUTO: 83.9 FL — SIGNIFICANT CHANGE UP (ref 80–100)
NRBC # BLD: 0 /100 WBCS — SIGNIFICANT CHANGE UP (ref 0–0)
PLATELET # BLD AUTO: 137 K/UL — LOW (ref 150–400)
POTASSIUM SERPL-MCNC: 4.1 MMOL/L — SIGNIFICANT CHANGE UP (ref 3.5–5.3)
POTASSIUM SERPL-SCNC: 4.1 MMOL/L — SIGNIFICANT CHANGE UP (ref 3.5–5.3)
PROT SERPL-MCNC: 6.4 G/DL — SIGNIFICANT CHANGE UP (ref 6–8.3)
RBC # BLD: 3.86 M/UL — LOW (ref 4.2–5.8)
RBC # FLD: 13.3 % — SIGNIFICANT CHANGE UP (ref 10.3–14.5)
SODIUM SERPL-SCNC: 137 MMOL/L — SIGNIFICANT CHANGE UP (ref 135–145)
WBC # BLD: 6.08 K/UL — SIGNIFICANT CHANGE UP (ref 3.8–10.5)
WBC # FLD AUTO: 6.08 K/UL — SIGNIFICANT CHANGE UP (ref 3.8–10.5)

## 2024-10-27 PROCEDURE — 99232 SBSQ HOSP IP/OBS MODERATE 35: CPT

## 2024-10-27 RX ORDER — RANOLAZINE 500 MG/1
500 TABLET, FILM COATED, EXTENDED RELEASE ORAL
Refills: 0 | Status: DISCONTINUED | OUTPATIENT
Start: 2024-10-27 | End: 2024-10-29

## 2024-10-27 RX ADMIN — HEPARIN SODIUM 5000 UNIT(S): 10000 INJECTION INTRAVENOUS; SUBCUTANEOUS at 21:22

## 2024-10-27 RX ADMIN — NEBIVOLOL 2.5 MILLIGRAM(S): 10 TABLET ORAL at 05:26

## 2024-10-27 RX ADMIN — RANOLAZINE 500 MILLIGRAM(S): 500 TABLET, FILM COATED, EXTENDED RELEASE ORAL at 18:49

## 2024-10-27 RX ADMIN — NITROFURANTOIN 100 MILLIGRAM(S): 25 SUSPENSION ORAL at 05:27

## 2024-10-27 RX ADMIN — NITROFURANTOIN 100 MILLIGRAM(S): 25 SUSPENSION ORAL at 18:49

## 2024-10-27 RX ADMIN — Medication 12.5 MILLIGRAM(S): at 05:25

## 2024-10-27 RX ADMIN — Medication 81 MILLIGRAM(S): at 15:05

## 2024-10-27 RX ADMIN — HEPARIN SODIUM 5000 UNIT(S): 10000 INJECTION INTRAVENOUS; SUBCUTANEOUS at 05:26

## 2024-10-27 RX ADMIN — DAPAGLIFLOZIN 10 MILLIGRAM(S): 10 TABLET, FILM COATED ORAL at 15:04

## 2024-10-27 RX ADMIN — CLOPIDOGREL 75 MILLIGRAM(S): 75 TABLET ORAL at 15:04

## 2024-10-27 RX ADMIN — Medication 80 MILLIGRAM(S): at 21:21

## 2024-10-27 NOTE — PROGRESS NOTE ADULT - PROBLEM SELECTOR PROBLEM 2
HTN (hypertension)
UTI (urinary tract infection)

## 2024-10-27 NOTE — DISCHARGE NOTE PROVIDER - HOSPITAL COURSE
INCOMPLETE    79M, former smoker, w/ PMHx of HTN, HLD, T2DM, 3vCAD (DESx3 RCA, see below), ADRIÁN on CPAP, asthma, BPH and OA who presented to [facility] 10/21/24 for planned rota-assisted PCI of LAD and now s/p rota/FAITH x 3 p/mLAD w/ procedure c/b no-reflow of dLAD requiring IABP for coronary perfusion (removed 10/23/24) and upgrade to CCU. Course further c/b left femoral 3x3in hematoma (RESOLVED) and GLENN V2-V4 accompanied by chest pain on 10/24/24 and started on nitrates and eventually nitro gtt w/ improvement in pain. Patient deemed stable and stepped down to [facility] cardiac telemetry. NTG gtt d/c and started on oral nitrates. Noted to hematuria with UTi. Pending repeat TTE to assess LVEF.       Pt seen and examined at bedside this AM without any complaints or events overnight, VSS, labs and telemetry reviewed and pt stable for discharge as discussed with  ___.     Pt has received appropriate discharge instructions, including medication regimen, access site management and follow up with  __ in 1-2 weeks.     GLP-1 receptor antagonist/SGLT2 inhibitor meds discussed with patient and encouraged to discuss further with PMD or Endocrinologist at next visit.     Pt's discharge copies detailed cardiovascular history, medications, testing/treatments, OR pt has created a patient portal account and instructed to provide their records at their 1st appointment.    CV REGIMEN UPON DISCHARGE:    DIABETIC REGIMEN UPON DISCHARGE: -- HbA1c    OTHER MEDICATIONS UPON DISCHARGE:     79M, former smoker, w/ PMHx of HTN, HLD, T2DM, 3vCAD (DESx3 RCA, see below), ADRIÁN on CPAP, asthma, BPH and OA who presented to [facility] Lab 10/21/24 for planned rota-assisted PCI of LAD and now s/p rota/FAITH x 3 p/mLAD w/ procedure c/b no-reflow of dLAD requiring IABP for coronary perfusion (removed 10/23/24) and upgrade to CCU. Course further c/b left femoral 3x3in hematoma (RESOLVED) and GLENN V2-V4 accompanied by chest pain on 10/24/24 and started on nitrates and eventually nitro gtt w/ improvement in pain. Patient deemed stable and stepped down to [facility] cardiac telemetry. NTG gtt d/c and started on oral nitrates. Noted to hematuria with UTi. Pending repeat TTE to assess LVEF.     Patient had repeat ECHO on 10/28/2024 which was limited for LVEF assessment and WMA:   ECHO on 10/28/2024 was    1. Limited study obtained for evaluation of left ventricular function.  2. Left ventricular systolic function is severely reduced with a   calculated ejection fraction of 30% with regional wall motion   abnormalities. Entire septum, mid and apical anterior wall, apical   inferior segment, and apex are abnormal.  3. No LV thrombus noted on contrast enhanced images.    For UTI and Hematuria patient was discharged on 10/25/24 to 11/1/2024 with Macrobid     Pt seen and examined at bedside this AM without any complaints or events overnight, VSS, labs and telemetry reviewed and pt stable for discharge as discussed with     Pt has received appropriate discharge instructions, including medication regimen, access site management and follow up with Dr. Wong 11/5 10am [address] in 1-2      GLP-1 receptor antagonist/SGLT2 inhibitor meds discussed with patient and encouraged to discuss further with PMD or Endocrinologist at next visit.     Pt's discharge copies detailed cardiovascular history, medications, testing/treatments, OR pt has created a patient portal account and instructed to provide their records at their 1st appointment.      CV REGIMEN UPON DISCHARGE:     DIABETIC REGIMEN UPON DISCHARGE: 6.3 HbA1c    OTHER MEDICATIONS UPON DISCHARGE:

## 2024-10-27 NOTE — PROGRESS NOTE ADULT - SUBJECTIVE AND OBJECTIVE BOX
Chief complaint: Patient is a 79y old  Male who presents with a chief complaint of Cardiac catheterization (26 Oct 2024 07:51)    Interval history: Chest discomfort overnight. EKG unchanged from prior.  Patient laying comfortably in bed. Currently denies CP, dizziness, palpitations, SOB, dyspnea, coughing, headaches, N/V/D/abdominal pain. Patient understands plan for the day.     Review of systems: A complete 10-point review of systems was obtained and is negative except as stated in the interval history.    Vitals:  T(F): 97.9, Max: 98.1 (10-26 @ 20:48)  HR: 68 (68 - 86)  BP: 98/58 (98/58 - 126/68)  RR: 18 (17 - 19)  SpO2: 97% (96% - 98%)    Ins & outs:     10-24 @ 07:01  -  10-25 @ 07:00  --------------------------------------------------------  IN: 0 mL / OUT: 650 mL / NET: -650 mL    10-25 @ 07:01  -  10-26 @ 07:00  --------------------------------------------------------  IN: 480 mL / OUT: 450 mL / NET: 30 mL    10-26 @ 07:01  -  10-27 @ 07:00  --------------------------------------------------------  IN: 820 mL / OUT: 1450 mL / NET: -630 mL    10-27 @ 07:01  -  10-27 @ 10:25  --------------------------------------------------------  IN: 240 mL / OUT: 200 mL / NET: 40 mL      Weight trend:      Physical exam:  General: No apparent distress  HEENT: Anicteric sclera. Moist mucous membranes. JVD -   Cardiac: Regular rate and rhythm. No murmurs, rubs, or gallops.   Vascular: Symmetric radial pulses. Dorsalis pedis pulses palpable.   Respiratory: Normal effort.   Clear to ascultation.    Abdomen: Soft, nontender. Audible bowel sounds.   Extremities: Warm with noedema. No cyanosis or clubbing.   Skin: Warm and dry. No rash.   Neurologic: Grossly normal motor function.   Psychiatric: Oriented to person, place, and time.     Data reviewed:       - Labs:                        10.1 6.08  )-----------( 137      ( 27 Oct 2024 07:02 )             32.4     10-27    137  |  103  |  20  ----------------------------<  70  4.1   |  22  |  1.16    Ca    8.6      27 Oct 2024 07:02  Mg     2.1     10-27    TPro  6.4  /  Alb  3.6  /  TBili  0.7  /  DBili  x   /  AST  33  /  ALT  25  /  AlkPhos  51  10-27            Triglycerides, Serum: 39 mg/dL (10-21-24 @ 08:41)  LDL Cholesterol Calculated: 50 mg/dL (10-21-24 @ 08:41)      Urinalysis Basic - ( 27 Oct 2024 07:02 )    Color: x / Appearance: x / SG: x / pH: x  Gluc: 70 mg/dL / Ketone: x  / Bili: x / Urobili: x   Blood: x / Protein: x / Nitrite: x   Leuk Esterase: x / RBC: x / WBC x   Sq Epi: x / Non Sq Epi: x / Bacteria: x        Medications:  aspirin enteric coated 81 milliGRAM(s) Oral daily  atorvastatin 80 milliGRAM(s) Oral at bedtime  chlorhexidine 2% Cloths 1 Application(s) Topical <User Schedule>  clopidogrel Tablet 75 milliGRAM(s) Oral daily  dapagliflozin 10 milliGRAM(s) Oral daily  dextrose 50% Injectable 25 Gram(s) IV Push once  dextrose 50% Injectable 12.5 Gram(s) IV Push once  dextrose 50% Injectable 25 Gram(s) IV Push once  glucagon  Injectable 1 milliGRAM(s) IntraMuscular once  heparin   Injectable 5000 Unit(s) SubCutaneous every 8 hours  insulin lispro (ADMELOG) corrective regimen sliding scale   SubCutaneous Before meals and at bedtime  isosorbide   mononitrate ER Tablet (IMDUR) 60 milliGRAM(s) Oral <User Schedule>  losartan 25 milliGRAM(s) Oral every 24 hours  nebivolol 2.5 milliGRAM(s) Oral daily  nitrofurantoin monohydrate/macrocrystals (MACROBID) 100 milliGRAM(s) Oral two times a day  ranolazine 500 milliGRAM(s) Oral two times a day  spironolactone 12.5 milliGRAM(s) Oral daily    Drips:  dextrose 5%. 1000 milliLiter(s) (50 mL/Hr) IV Continuous <Continuous>  dextrose 5%. 1000 milliLiter(s) (100 mL/Hr) IV Continuous <Continuous>    PRN:     Allergies    No Known Allergies    Intolerances        Home Medications:

## 2024-10-27 NOTE — DISCHARGE NOTE PROVIDER - NSDCCPCAREPLAN_GEN_ALL_CORE_FT
PRINCIPAL DISCHARGE DIAGNOSIS  Diagnosis: NSTEMI (non-ST elevation myocardial infarction)  Assessment and Plan of Treatment:       SECONDARY DISCHARGE DIAGNOSES  Diagnosis: Atrial flutter  Assessment and Plan of Treatment:     Diagnosis: HTN (hypertension)  Assessment and Plan of Treatment:     Diagnosis: HLD (hyperlipidemia)  Assessment and Plan of Treatment:     Diagnosis: UTI (urinary tract infection)  Assessment and Plan of Treatment:      PRINCIPAL DISCHARGE DIAGNOSIS  Diagnosis: NSTEMI (non-ST elevation myocardial infarction)  Assessment and Plan of Treatment: You were found to have blockages in the arteries of your heart, also known as Coronary Artery Disease. You underwent a cardiac catheterization on 10/28/2024 and received 3 Stents to the prox to mid Left Anterior Descending artery.  PLEASE CONTINUE ASPIRIN 81MG DAILY AND PLAVIX 75MG DAILY. DO NOT STOP THESE MEDICATIONS FOR ANY REASON AS THEY ARE KEEPING YOUR STENT OPEN AND PREVENTING A HEART ATTACK.   Avoid strenuous activity or heavy lifting anything more than 5lbs for the next five days. Do not take a bath or swim for the next five days; you may shower. For any bleeding or hematoma formation (hardened blood collection under the skin) at the access site of your groin please hold pressure and go to the emergency room.   Continue with Ranexa Twice a day  Please follow up with Dr. Wong on 11/5/2024. For recurrent chest pain, please call your doctor or go to the emergency room.      SECONDARY DISCHARGE DIAGNOSES  Diagnosis: HTN (hypertension)  Assessment and Plan of Treatment: Continue with Losartan Imdur and Nebivolol    Diagnosis: Acute systolic congestive heart failure  Assessment and Plan of Treatment: You had an Echocardiogram done which a weak heart, also known as Congestive Heart Failure (CHF). Heart failure is a condition in which the heart does not pump or fill with blood well. As a result, the heart lags behind in its job of moving blood throughout the body. This can lead to symptoms such as swelling, trouble breathing, and feeling tired. Your Ejection Fraction (EF) is 30-35%, a normal EF is 55-60%.  -Please continue Losartan 25mg, Nebivolol 2.5, Spirinolactone 12.5, Imdur 30mg, to help strenghten your heart muscle  -Avoid drinking more than 1.5L of fluid daily and maintain a low salt diet (max 2grams daily).  -Please weigh yourself daily, for any significant increases in daily weight of 2lbs/day or 5lbs/week with associated swelling in the legs or abdomen and/or shortness of breath, please call your doctor or go to the emergency room.  -Follow up with Dr. Wong in 1 week.    Diagnosis: UTI (urinary tract infection)  Assessment and Plan of Treatment: Please continue Macrobid for a 7 day course of 10/25 to 11/1 and continue to monitor for signs of bleeding, pain or burning on urination, fever chills or suprapubic pain    Diagnosis: HLD (hyperlipidemia)  Assessment and Plan of Treatment: Continue with Atorvastatin 80mg for continued cholesterol control     PRINCIPAL DISCHARGE DIAGNOSIS  Diagnosis: NSTEMI (non-ST elevation myocardial infarction)  Assessment and Plan of Treatment: You were found to have blockages in the arteries of your heart, also known as Coronary Artery Disease. You underwent a cardiac catheterization on 10/28/2024 and received 3 Stents to the prox to mid Left Anterior Descending artery.  PLEASE CONTINUE ASPIRIN 81MG DAILY AND Brillinta 90MG Twice DAILY. DO NOT STOP THESE MEDICATIONS FOR ANY REASON AS THEY ARE KEEPING YOUR STENT OPEN AND PREVENTING A HEART ATTACK.   Avoid strenuous activity or heavy lifting anything more than 5lbs for the next five days. Do not take a bath or swim for the next five days; you may shower. For any bleeding or hematoma formation (hardened blood collection under the skin) at the access site of your groin please hold pressure and go to the emergency room.   Continue with Ranexa Twice a day as well as IMDUR 60mg Twice a day  Please follow up with Dr. Wong on 11/5/2024. For recurrent chest pain, please call your doctor or go to the emergency room.  - We have provided you with a prescription for cardiac rehab which is a medically supervised exercise program for your heart and has been shown to improve the quantity and quality of life of people with heart disease like yours.   - You should attend cardiac rehab 3 times per week for 12 weeks.   - We have provided you with a list of nearby facilities.   -Please call your insurance carrier to determine which of these facilities are covered under your plan.   - Please bring this prescription with you to your follow up appointment with your cardiologist who can then further assist you to enroll into a cardiac rehab program.      SECONDARY DISCHARGE DIAGNOSES  Diagnosis: HTN (hypertension)  Assessment and Plan of Treatment: Continue with Entresto TWICE daily, Imdur TWICE daily and Nebivolol    Diagnosis: Acute systolic congestive heart failure  Assessment and Plan of Treatment: You had an Echocardiogram done which a weak heart, also known as Congestive Heart Failure (CHF). Heart failure is a condition in which the heart does not pump or fill with blood well. As a result, the heart lags behind in its job of moving blood throughout the body. This can lead to symptoms such as swelling, trouble breathing, and feeling tired. Your Ejection Fraction (EF) is 30-35% was repeated on discharge, a normal EF is 55-60%.  -Please continue Entresto 24/26 TWICE DAILY, Nebivolol 2.5, Spirinolactone 12.5, Imdur 60mg TWICE daily, to help strenghten your heart muscle  -Avoid drinking more than 1.5L of fluid daily and maintain a low salt diet (max 2grams daily).  -Please weigh yourself daily, for any significant increases in daily weight of 2lbs/day or 5lbs/week with associated swelling in the legs or abdomen and/or shortness of breath, please call your doctor or go to the emergency room.  -Follow up with Dr. Wong in 1 week.    Diagnosis: UTI (urinary tract infection)  Assessment and Plan of Treatment: Please continue Macrobid for a 7 day course of 10/25 to 11/1 and continue to monitor for signs of bleeding, pain or burning on urination, fever chills or suprapubic pain    Diagnosis: HLD (hyperlipidemia)  Assessment and Plan of Treatment: Continue with Atorvastatin 80mg for continued cholesterol control     PRINCIPAL DISCHARGE DIAGNOSIS  Diagnosis: NSTEMI (non-ST elevation myocardial infarction)  Assessment and Plan of Treatment: You were found to have blockages in the arteries of your heart, also known as Coronary Artery Disease. You underwent a cardiac catheterization on 10/28/2024 and received 3 Stents to the prox to mid Left Anterior Descending artery.  PLEASE CONTINUE ASPIRIN 81MG DAILY AND Brillinta 90MG Twice DAILY. DO NOT STOP THESE MEDICATIONS FOR ANY REASON AS THEY ARE KEEPING YOUR STENT OPEN AND PREVENTING A HEART ATTACK.   Avoid strenuous activity or heavy lifting anything more than 5lbs for the next five days. Do not take a bath or swim for the next five days; you may shower. For any bleeding or hematoma formation (hardened blood collection under the skin) at the access site of your groin please hold pressure and go to the emergency room.   Continue with Ranexa Twice a day as well as IMDUR 60mg Twice a day  Please follow up with Dr. Wong on 11/5/2024. For recurrent chest pain, please call your doctor or go to the emergency room.  - We have provided you with a prescription for cardiac rehab which is a medically supervised exercise program for your heart and has been shown to improve the quantity and quality of life of people with heart disease like yours.   - You should attend cardiac rehab 3 times per week for 12 weeks.   - We have provided you with a list of nearby facilities.   -Please call your insurance carrier to determine which of these facilities are covered under your plan.   - Please bring this prescription with you to your follow up appointment with your cardiologist who can then further assist you to enroll into a cardiac rehab program.      SECONDARY DISCHARGE DIAGNOSES  Diagnosis: Acute systolic congestive heart failure  Assessment and Plan of Treatment: You had an Echocardiogram done which a weak heart, also known as Congestive Heart Failure (CHF). Heart failure is a condition in which the heart does not pump or fill with blood well. As a result, the heart lags behind in its job of moving blood throughout the body. This can lead to symptoms such as swelling, trouble breathing, and feeling tired. Your Ejection Fraction (EF) is 30-35% was repeated on discharge, a normal EF is 55-60%.  -Please continue Entresto 24/26 TWICE DAILY, Nebivolol 2.5, Spirinolactone 12.5, Imdur 60mg TWICE daily, to help strenghten your heart muscle  -Avoid drinking more than 1.5L of fluid daily and maintain a low salt diet (max 2grams daily).  -Please weigh yourself daily, for any significant increases in daily weight of 2lbs/day or 5lbs/week with associated swelling in the legs or abdomen and/or shortness of breath, please call your doctor or go to the emergency room.  -Follow up with Dr. Wong in 1 week.    Diagnosis: HTN (hypertension)  Assessment and Plan of Treatment: Continue with Entresto TWICE daily, Imdur TWICE daily and Nebivolol    Diagnosis: UTI (urinary tract infection)  Assessment and Plan of Treatment: Please continue Macrobid for a 7 day course of 10/25 to 11/1 and continue to monitor for signs of bleeding, pain or burning on urination, fever chills or suprapubic pain    Diagnosis: HLD (hyperlipidemia)  Assessment and Plan of Treatment: Continue with Atorvastatin 80mg for continued cholesterol control

## 2024-10-27 NOTE — DISCHARGE NOTE PROVIDER - ATTENDING DISCHARGE PHYSICAL EXAMINATION:
1. CAD  S.p LAD FAITH placement complicated by low flow, chest pain resolved, continue DAPT, BB and lipitor.    2. HFrEF  New onset, stage B, ischemic nondilated HFrEF 30%. Started on GDMT with BB, entresto, farxiga and aldactone.  F.u with cardiology as outpt, GDMT escalation, may need ICD as outpt, no ectopy inpatient.

## 2024-10-27 NOTE — DISCHARGE NOTE PROVIDER - PROVIDER TOKENS
PROVIDER:[TOKEN:[[id number]],SCHEDULEDAPPT:[11/05/2024],SCHEDULEDAPPTTIME:[10:00 AM],ESTABLISHEDPATIENT:[T]]

## 2024-10-27 NOTE — PROGRESS NOTE ADULT - PROBLEM SELECTOR PLAN 3
SBP high 90s-100s  - holding Amlodipine  - continue Losartan 25 mg daily (Home 100 mg daily), Nebivolol 5 mg daily and Spironolactone 12.5 mg daily  - Imdur 60 mg BID s/p NTG gtt
SBP high 90s-100s  - holding Amlodipine  - continue Losartan 25 mg daily (Home 100 mg daily), Nebivolol 5 mg daily and Spironolactone 12.5 mg daily  - NTG gtt d/c, now on Imdur 60 mg BID
SBP high 90s-100s  - holding Amlodipine  - continue Losartan 25 mg daily (Home 100 mg daily), Nebivolol 5 mg daily and Spironolactone 12.5 mg daily  - Imdur 60 mg BID s/p NTG gtt
- continue Lipitor 80 mg daily at bedtime (Crestor 20 mg daily)

## 2024-10-27 NOTE — PROGRESS NOTE ADULT - NS ATTEND AMEND GEN_ALL_CORE FT
79M, former smoker, w/ PMHx of HTN, HLD, T2DM, 3vCAD (DESx3 RCA, see below), ADRIÁN on CPAP, asthma, BPH and OA who presented to [facility] 10/21/24 for planned rota-assisted PCI of LAD and now s/p rota/FAITH x 3 p/mLAD w/ procedure c/b no-reflow of dLAD requiring IABP for coronary perfusion (removed 10/23/24) and upgrade to CCU. Course further c/b left femoral 3x3in hematoma (RESOLVED) and GLENN V2-V4 accompanied by chest pain on 10/24/24 and started on nitrates and eventually nitro gtt w/ improvement in pain. Patient deemed stable and stepped down to [facility] cardiac telemetry 10/24/24.    1. CAD  Pain resolved on nitro gtt. Start imdur 60 mg bid. continue DAPT, BB and lipitor.    2. HFrEF  Recovered LVEF on last echo. Given troponin elevation, and EKG changes, will repeat limited echo for WM and LVEF prior to dc.  GDMT with BB, losartan, farxiga and aldactone.
79M former smoker with history of HTN, HLD, T2DM, 3vCAD, ADRIÁN on CPAP, asthma, BPH and OA who presented for staged intervention of prox/mid LAD with rotational atherectomy and complicated by no-reflow of dLAD requiring IABP for coronary perfusion, in the CCU. Stepped down with management of post procedure angina, initial GLENN that have slowly resolved.     Exam:   NAD  JVP normal  Lungs CTA  WWP, no edema. LFA soft. small serosoma with intact pulse. No hematomas.       CAD- c/b no reflow, was doing well, ambulating without pain, DAPT, BB, imdur  HFrEF- recovery on last echo, pending repeat TTE, GDMT with BB, losartan, farxiga and aldactone.  UTI- macrobid  HTN- GDMT as above.
79M former smoker with history of HTN, HLD, T2DM, 3vCAD, ADRIÁN on CPAP, asthma, BPH and OA who presented for staged intervention of prox/mid LAD with rotational atherectomy and complicated by no-reflow of dLAD requiring IABP for coronary perfusion, in the CCU. Stepped down with management of post procedure angina, initial GLENN that have slowly resolved.     Exam:   NAD  JVP normal  Lungs CTA  WWP, no edema. LFA soft. small serosoma with intact pulse. No hematomas.       CAD- c/b no reflow, was doing well, ambulating without pain, DAPT, BB, imdur, Add ranexa. Overnight angina episode.   HFrEF- recovery on last echo, pending repeat TTE, GDMT with BB, losartan, farxiga and aldactone.  UTI- macrobid  HTN- GDMT as above.
Patient accepted to telemetry.  Plan as above.  In short, LAD staged FAITH placement complicated requiring IABP placement. CP today. responded to nitrol  Plan to keep nitro gtt overnight, optimize CAD/HF management in AM, PT evaluation.

## 2024-10-27 NOTE — DISCHARGE NOTE PROVIDER - NSDCMRMEDTOKEN_GEN_ALL_CORE_FT
amLODIPine 5 mg oral tablet: 1 tab(s) orally once a day  Aspirin EC 81 mg oral delayed release tablet: 1 tab(s) orally once a day  clopidogrel 75 mg oral tablet: 1 tab(s) orally once a day  colchicine 0.6 mg oral tablet: 1 tab(s) orally once a day  losartan 100 mg oral tablet: 1 tab(s) orally once a day  nebivolol 5 mg oral tablet: 1 tab(s) orally once a day  rosuvastatin 10 mg oral tablet: 1 tab(s) orally once a day (at bedtime)  Symbicort 160 mcg-4.5 mcg/inh inhalation aerosol: 2 puff(s) inhaled 2 times a day   colchicine 0.6 mg oral tablet: 1 tab(s) orally once a day  nebivolol 2.5 mg oral tablet: 1 tab(s) orally once a day  Symbicort 160 mcg-4.5 mcg/inh inhalation aerosol: 2 puff(s) inhaled 2 times a day   Aspirin EC 81 mg oral delayed release tablet: 1 tab(s) orally once a day  atorvastatin 80 mg oral tablet: 1 tab(s) orally once a day (at bedtime)  Brilinta (ticagrelor) 90 mg oral tablet: 1 tab(s) orally 2 times a day  Cardiac Rehab: Cardiac Rehab 2-3 times a week for 12 weeks  Cardio: Dr. Wong   Diagnosis: s/p PCI  colchicine 0.6 mg oral tablet: 1 tab(s) orally once a day  Entresto 24 mg-26 mg oral tablet: 1 tab(s) orally 2 times a day  Farxiga 10 mg oral tablet: 1 tab(s) orally once a day  isosorbide mononitrate 60 mg oral tablet, extended release: 1 tab(s) orally 2 times a day  nebivolol 2.5 mg oral tablet: 1 tab(s) orally once a day  nitrofurantoin macrocrystals-monohydrate 100 mg oral capsule: 1 cap(s) orally 2 times a day Continue from 10/25/2024 to 11/1/2024  ranolazine 500 mg oral tablet, extended release: 1 tab(s) orally 2 times a day  spironolactone 25 mg oral tablet: 0.5 tab(s) orally once a day  Symbicort 160 mcg-4.5 mcg/inh inhalation aerosol: 2 puff(s) inhaled 2 times a day   Aspirin EC 81 mg oral delayed release tablet: 1 tab(s) orally once a day  atorvastatin 80 mg oral tablet: 1 tab(s) orally once a day (at bedtime)  Brilinta (ticagrelor) 90 mg oral tablet: 1 tab(s) orally 2 times a day  Cardiac Rehab: Cardiac Rehab 2-3 times a week for 12 weeks  Cardio: Dr. Wong   Diagnosis: s/p PCI  colchicine 0.6 mg oral tablet: 1 tab(s) orally once a day  dapagliflozin 10 mg oral tablet: 1 tab(s) orally once a day  Entresto 24 mg-26 mg oral tablet: 1 tab(s) orally 2 times a day  isosorbide mononitrate 60 mg oral tablet, extended release: 1 tab(s) orally 2 times a day  nebivolol 2.5 mg oral tablet: 1 tab(s) orally once a day  nitrofurantoin macrocrystals-monohydrate 100 mg oral capsule: 1 cap(s) orally 2 times a day Continue from 10/25/2024 to 11/1/2024  ranolazine 500 mg oral tablet, extended release: 1 tab(s) orally 2 times a day  spironolactone 25 mg oral tablet: 0.5 tab(s) orally once a day  Symbicort 160 mcg-4.5 mcg/inh inhalation aerosol: 2 puff(s) inhaled 2 times a day   Aspirin EC 81 mg oral delayed release tablet: 1 tab(s) orally once a day  atorvastatin 80 mg oral tablet: 1 tab(s) orally once a day (at bedtime)  Brilinta (ticagrelor) 90 mg oral tablet: 1 tab(s) orally 2 times a day  Cardiac Rehab: Cardiac Rehab 2-3 times a week for 12 weeks  Cardio: Dr. Wong   Diagnosis: s/p PCI  dapagliflozin 10 mg oral tablet: 1 tab(s) orally once a day  Entresto 24 mg-26 mg oral tablet: 1 tab(s) orally 2 times a day  isosorbide mononitrate 60 mg oral tablet, extended release: 1 tab(s) orally 2 times a day  nebivolol 2.5 mg oral tablet: 1 tab(s) orally once a day  nitrofurantoin macrocrystals-monohydrate 100 mg oral capsule: 1 cap(s) orally 2 times a day Continue from 10/25/2024 to 11/1/2024  ranolazine 500 mg oral tablet, extended release: 1 tab(s) orally 2 times a day  spironolactone 25 mg oral tablet: 0.5 tab(s) orally once a day  Symbicort 160 mcg-4.5 mcg/inh inhalation aerosol: 2 puff(s) inhaled 2 times a day

## 2024-10-27 NOTE — PROGRESS NOTE ADULT - PROBLEM SELECTOR PLAN 1
-- Cardiac cath (9/20/24) @ [facility]: staged PCI RCA: DESx 3 m/pRCA 99%. Plan to return for residual LAD. Access RFA PC.   -- Cardiac Catheterization (10/21/24): rota-assisted/IVUS guided FAITH x 3 p/mLAD w/ IABP placed for poor flow in dLAD with improvement of flow. Right radial access and left femoral access (for IABP)   -- TTE (9/13/24): LVEF 62%, mild LVH, trace TR, trace AI.  -- TTE (10/22/24): entire apex and mid and apical anterior septum are hypokinetic, LVEF 55-60%, GIDD, no pericardial effusion  - Antianginal: s/p Nitro drip 50mcg/min, now on Imdur 60 mg BID, start ranexa 500 mg BID for residual CP  - DAPT: Aspirin/Plavix  - Plan for limited TTE  to assess for wall motion and LVEF

## 2024-10-27 NOTE — PROGRESS NOTE ADULT - PROBLEM SELECTOR PLAN 2
Pt reporting hematuria yesterday morning, on UA 10/24 + leukocyte esterase, + blood, denies dysuria, urgency, frequency  - UA 10/25 large blood (color still yellow), small leuk esterase, elevated RBC and WBC. Reflex culture pending  - in urinal at bedside urine yellow in color  - continue macrobid 100 mg BID x 7 days (10/25- 11/1)

## 2024-10-27 NOTE — PROGRESS NOTE ADULT - ASSESSMENT
79M, former smoker, w/ PMHx of HTN, HLD, T2DM, 3vCAD (DESx3 RCA, see below), ADRIÁN on CPAP, asthma, BPH and OA who presented to [facility] 10/21/24 for planned rota-assisted PCI of LAD and now s/p rota/FAITH x 3 p/mLAD w/ procedure c/b no-reflow of dLAD requiring IABP for coronary perfusion (removed 10/23/24) and upgrade to CCU. Course further c/b left femoral 3x3in hematoma (RESOLVED) and GLENN V2-V4 accompanied by chest pain on 10/24/24 and started on nitrates and eventually nitro gtt w/ improvement in pain. Patient deemed stable and stepped down to [facility] cardiac telemetry. NTG gtt d/c and started on oral nitrates. Noted to hematuria with UTi. Pending repeat TTE to assess LVEF.

## 2024-10-27 NOTE — DISCHARGE NOTE PROVIDER - CARE PROVIDER_API CALL
Ruthann Wong  Interventional Cardiology  [address] [place] [id number]  Phone: [phone]  Fax: [phone]  Established Patient  Scheduled Appointment: 11/05/2024 10:00 AM

## 2024-10-27 NOTE — DISCHARGE NOTE PROVIDER - NSDCFUSCHEDAPPT_GEN_ALL_CORE_FT
Ruthann Wong  [facility] Physician [facility]  [facility]ART[facility] [address] [facility]  Scheduled Appointment: 11/05/2024

## 2024-10-28 ENCOUNTER — RESULT REVIEW (OUTPATIENT)
Age: 79
End: 2024-10-28

## 2024-10-28 ENCOUNTER — TRANSCRIPTION ENCOUNTER (OUTPATIENT)
Age: 79
End: 2024-10-28

## 2024-10-28 LAB
ANION GAP SERPL CALC-SCNC: 12 MMOL/L — SIGNIFICANT CHANGE UP (ref 5–17)
BUN SERPL-MCNC: 17 MG/DL — SIGNIFICANT CHANGE UP (ref 7–23)
CALCIUM SERPL-MCNC: 9 MG/DL — SIGNIFICANT CHANGE UP (ref 8.4–10.5)
CHLORIDE SERPL-SCNC: 102 MMOL/L — SIGNIFICANT CHANGE UP (ref 96–108)
CO2 SERPL-SCNC: 23 MMOL/L — SIGNIFICANT CHANGE UP (ref 22–31)
CREAT SERPL-MCNC: 1.13 MG/DL — SIGNIFICANT CHANGE UP (ref 0.5–1.3)
CULTURE RESULTS: SIGNIFICANT CHANGE UP
EGFR: 65 ML/MIN/1.73M2 — SIGNIFICANT CHANGE UP
GLUCOSE BLDC GLUCOMTR-MCNC: 118 MG/DL — HIGH (ref 70–99)
GLUCOSE BLDC GLUCOMTR-MCNC: 119 MG/DL — HIGH (ref 70–99)
GLUCOSE BLDC GLUCOMTR-MCNC: 138 MG/DL — HIGH (ref 70–99)
GLUCOSE BLDC GLUCOMTR-MCNC: 83 MG/DL — SIGNIFICANT CHANGE UP (ref 70–99)
GLUCOSE SERPL-MCNC: 79 MG/DL — SIGNIFICANT CHANGE UP (ref 70–99)
HCT VFR BLD CALC: 35.5 % — LOW (ref 39–50)
HGB BLD-MCNC: 11.2 G/DL — LOW (ref 13–17)
MAGNESIUM SERPL-MCNC: 2 MG/DL — SIGNIFICANT CHANGE UP (ref 1.6–2.6)
MCHC RBC-ENTMCNC: 26.2 PG — LOW (ref 27–34)
MCHC RBC-ENTMCNC: 31.5 GM/DL — LOW (ref 32–36)
MCV RBC AUTO: 83.1 FL — SIGNIFICANT CHANGE UP (ref 80–100)
NRBC # BLD: 0 /100 WBCS — SIGNIFICANT CHANGE UP (ref 0–0)
PLATELET # BLD AUTO: 165 K/UL — SIGNIFICANT CHANGE UP (ref 150–400)
POTASSIUM SERPL-MCNC: 4.3 MMOL/L — SIGNIFICANT CHANGE UP (ref 3.5–5.3)
POTASSIUM SERPL-SCNC: 4.3 MMOL/L — SIGNIFICANT CHANGE UP (ref 3.5–5.3)
RBC # BLD: 4.27 M/UL — SIGNIFICANT CHANGE UP (ref 4.2–5.8)
RBC # FLD: 13.3 % — SIGNIFICANT CHANGE UP (ref 10.3–14.5)
SODIUM SERPL-SCNC: 137 MMOL/L — SIGNIFICANT CHANGE UP (ref 135–145)
SPECIMEN SOURCE: SIGNIFICANT CHANGE UP
WBC # BLD: 5.73 K/UL — SIGNIFICANT CHANGE UP (ref 3.8–10.5)
WBC # FLD AUTO: 5.73 K/UL — SIGNIFICANT CHANGE UP (ref 3.8–10.5)

## 2024-10-28 PROCEDURE — 93308 TTE F-UP OR LMTD: CPT | Mod: 26

## 2024-10-28 PROCEDURE — 99239 HOSP IP/OBS DSCHRG MGMT >30: CPT

## 2024-10-28 RX ORDER — SACUBITRIL AND VALSARTAN 97; 103 MG/1; MG/1
1 TABLET, FILM COATED ORAL
Qty: 60 | Refills: 0
Start: 2024-10-28 | End: 2024-11-26

## 2024-10-28 RX ORDER — NEBIVOLOL 10 MG/1
1 TABLET ORAL
Qty: 0 | Refills: 0 | DISCHARGE
Start: 2024-10-28

## 2024-10-28 RX ORDER — CLOPIDOGREL 75 MG/1
1 TABLET ORAL
Qty: 30 | Refills: 11
Start: 2024-10-28 | End: 2025-10-22

## 2024-10-28 RX ORDER — TICAGRELOR 90 MG/1
180 TABLET ORAL ONCE
Refills: 0 | Status: COMPLETED | OUTPATIENT
Start: 2024-10-28 | End: 2024-10-28

## 2024-10-28 RX ORDER — TICAGRELOR 90 MG/1
1 TABLET ORAL
Qty: 60 | Refills: 0
Start: 2024-10-28 | End: 2024-11-26

## 2024-10-28 RX ORDER — SPIRONOLACTONE 100 MG
0.5 TABLET ORAL
Qty: 15 | Refills: 6
Start: 2024-10-28 | End: 2025-05-25

## 2024-10-28 RX ORDER — ASPIRIN/MAG CARB/ALUMINUM AMIN 325 MG
1 TABLET ORAL
Qty: 30 | Refills: 11
Start: 2024-10-28 | End: 2025-10-22

## 2024-10-28 RX ORDER — DAPAGLIFLOZIN 10 MG/1
1 TABLET, FILM COATED ORAL
Qty: 0 | Refills: 0 | DISCHARGE
Start: 2024-10-28

## 2024-10-28 RX ORDER — TICAGRELOR 90 MG/1
90 TABLET ORAL EVERY 12 HOURS
Refills: 0 | Status: DISCONTINUED | OUTPATIENT
Start: 2024-10-29 | End: 2024-10-29

## 2024-10-28 RX ORDER — RANOLAZINE 500 MG/1
1 TABLET, FILM COATED, EXTENDED RELEASE ORAL
Qty: 60 | Refills: 6
Start: 2024-10-28 | End: 2025-05-25

## 2024-10-28 RX ORDER — ISOSORBIDE MONONITRATE 60 MG/1
1 TABLET, EXTENDED RELEASE ORAL
Qty: 0 | Refills: 0 | DISCHARGE
Start: 2024-10-28

## 2024-10-28 RX ORDER — LOSARTAN POTASSIUM 25 MG/1
1 TABLET ORAL
Qty: 30 | Refills: 6
Start: 2024-10-28 | End: 2025-05-25

## 2024-10-28 RX ORDER — NITROFURANTOIN 25 MG/5ML
1 SUSPENSION ORAL
Qty: 14 | Refills: 0
Start: 2024-10-28 | End: 2024-11-03

## 2024-10-28 RX ORDER — LOSARTAN POTASSIUM 25 MG/1
1 TABLET ORAL
Refills: 0 | DISCHARGE

## 2024-10-28 RX ORDER — ISOSORBIDE MONONITRATE 60 MG/1
1 TABLET, EXTENDED RELEASE ORAL
Qty: 60 | Refills: 5
Start: 2024-10-28 | End: 2025-04-25

## 2024-10-28 RX ORDER — ISOSORBIDE MONONITRATE 60 MG/1
1 TABLET, EXTENDED RELEASE ORAL
Qty: 30 | Refills: 6
Start: 2024-10-28 | End: 2025-05-25

## 2024-10-28 RX ORDER — DAPAGLIFLOZIN 10 MG/1
1 TABLET, FILM COATED ORAL
Qty: 30 | Refills: 0
Start: 2024-10-28 | End: 2024-11-26

## 2024-10-28 RX ADMIN — RANOLAZINE 500 MILLIGRAM(S): 500 TABLET, FILM COATED, EXTENDED RELEASE ORAL at 07:02

## 2024-10-28 RX ADMIN — HEPARIN SODIUM 5000 UNIT(S): 10000 INJECTION INTRAVENOUS; SUBCUTANEOUS at 14:17

## 2024-10-28 RX ADMIN — HEPARIN SODIUM 5000 UNIT(S): 10000 INJECTION INTRAVENOUS; SUBCUTANEOUS at 23:29

## 2024-10-28 RX ADMIN — RANOLAZINE 500 MILLIGRAM(S): 500 TABLET, FILM COATED, EXTENDED RELEASE ORAL at 18:43

## 2024-10-28 RX ADMIN — LOSARTAN POTASSIUM 25 MILLIGRAM(S): 25 TABLET ORAL at 09:44

## 2024-10-28 RX ADMIN — Medication 12.5 MILLIGRAM(S): at 07:02

## 2024-10-28 RX ADMIN — ISOSORBIDE MONONITRATE 60 MILLIGRAM(S): 60 TABLET, EXTENDED RELEASE ORAL at 09:44

## 2024-10-28 RX ADMIN — CLOPIDOGREL 75 MILLIGRAM(S): 75 TABLET ORAL at 12:01

## 2024-10-28 RX ADMIN — Medication 81 MILLIGRAM(S): at 12:01

## 2024-10-28 RX ADMIN — HEPARIN SODIUM 5000 UNIT(S): 10000 INJECTION INTRAVENOUS; SUBCUTANEOUS at 07:03

## 2024-10-28 RX ADMIN — NITROFURANTOIN 100 MILLIGRAM(S): 25 SUSPENSION ORAL at 18:44

## 2024-10-28 RX ADMIN — Medication 80 MILLIGRAM(S): at 23:30

## 2024-10-28 RX ADMIN — NITROFURANTOIN 100 MILLIGRAM(S): 25 SUSPENSION ORAL at 07:02

## 2024-10-28 RX ADMIN — DAPAGLIFLOZIN 10 MILLIGRAM(S): 10 TABLET, FILM COATED ORAL at 12:01

## 2024-10-28 RX ADMIN — NEBIVOLOL 2.5 MILLIGRAM(S): 10 TABLET ORAL at 07:03

## 2024-10-28 RX ADMIN — ISOSORBIDE MONONITRATE 60 MILLIGRAM(S): 60 TABLET, EXTENDED RELEASE ORAL at 23:30

## 2024-10-28 RX ADMIN — TICAGRELOR 180 MILLIGRAM(S): 90 TABLET ORAL at 18:44

## 2024-10-28 RX ADMIN — CHLORHEXIDINE GLUCONATE 1 APPLICATION(S): 40 SOLUTION TOPICAL at 07:03

## 2024-10-28 NOTE — DISCHARGE NOTE NURSING/CASE MANAGEMENT/SOCIAL WORK - NSTRANSFERBELONGINGSDISPO_GEN_A_NUR
Ke called back this morning in regards to his blood sugars.  He said that you had wanted him to call us if his blood sugars go over 150 for 5 days in a row and you would adjust his medications.      He said that yesterday it went as high as 232 and as low as 183.  This has been the trend for the past 6 days (he had left a message yesterday as well).  He asked if you make any adjustments to his medications to please send to [facility] in [place].    Please leave him a voicemail letting him know what he should do.  Thank you   with patient

## 2024-10-28 NOTE — PROGRESS NOTE ADULT - REASON FOR ADMISSION
Cardiac catheterization

## 2024-10-28 NOTE — DISCHARGE NOTE NURSING/CASE MANAGEMENT/SOCIAL WORK - PATIENT PORTAL LINK FT
You can access the FollowMyHealth Patient Portal offered by [facility] by registering at the following website: [email or web address]. By joining [facility]’s FollowMyHealth portal, you will also be able to view your health information using other applications (apps) compatible with our system.

## 2024-10-28 NOTE — DISCHARGE NOTE NURSING/CASE MANAGEMENT/SOCIAL WORK - FINANCIAL ASSISTANCE
[facility] provides services at a reduced cost to those who are determined to be eligible through [facility]’s financial assistance program. Information regarding [facility]’s financial assistance program can be found by going to [email or web address] or by calling [phone].

## 2024-10-29 VITALS — SYSTOLIC BLOOD PRESSURE: 116 MMHG | DIASTOLIC BLOOD PRESSURE: 60 MMHG | HEART RATE: 76 BPM

## 2024-10-29 LAB
ANION GAP SERPL CALC-SCNC: 12 MMOL/L — SIGNIFICANT CHANGE UP (ref 5–17)
BUN SERPL-MCNC: 19 MG/DL — SIGNIFICANT CHANGE UP (ref 7–23)
CALCIUM SERPL-MCNC: 9.4 MG/DL — SIGNIFICANT CHANGE UP (ref 8.4–10.5)
CHLORIDE SERPL-SCNC: 100 MMOL/L — SIGNIFICANT CHANGE UP (ref 96–108)
CO2 SERPL-SCNC: 24 MMOL/L — SIGNIFICANT CHANGE UP (ref 22–31)
CREAT SERPL-MCNC: 1.25 MG/DL — SIGNIFICANT CHANGE UP (ref 0.5–1.3)
EGFR: 58 ML/MIN/1.73M2 — LOW
GLUCOSE BLDC GLUCOMTR-MCNC: 114 MG/DL — HIGH (ref 70–99)
GLUCOSE BLDC GLUCOMTR-MCNC: 77 MG/DL — SIGNIFICANT CHANGE UP (ref 70–99)
GLUCOSE SERPL-MCNC: 106 MG/DL — HIGH (ref 70–99)
HCT VFR BLD CALC: 37.4 % — LOW (ref 39–50)
HGB BLD-MCNC: 11.3 G/DL — LOW (ref 13–17)
MAGNESIUM SERPL-MCNC: 1.9 MG/DL — SIGNIFICANT CHANGE UP (ref 1.6–2.6)
MCHC RBC-ENTMCNC: 25 PG — LOW (ref 27–34)
MCHC RBC-ENTMCNC: 30.2 GM/DL — LOW (ref 32–36)
MCV RBC AUTO: 82.7 FL — SIGNIFICANT CHANGE UP (ref 80–100)
NRBC # BLD: 0 /100 WBCS — SIGNIFICANT CHANGE UP (ref 0–0)
PLATELET # BLD AUTO: 246 K/UL — SIGNIFICANT CHANGE UP (ref 150–400)
POTASSIUM SERPL-MCNC: 4.2 MMOL/L — SIGNIFICANT CHANGE UP (ref 3.5–5.3)
POTASSIUM SERPL-SCNC: 4.2 MMOL/L — SIGNIFICANT CHANGE UP (ref 3.5–5.3)
RBC # BLD: 4.52 M/UL — SIGNIFICANT CHANGE UP (ref 4.2–5.8)
RBC # FLD: 13.2 % — SIGNIFICANT CHANGE UP (ref 10.3–14.5)
SODIUM SERPL-SCNC: 136 MMOL/L — SIGNIFICANT CHANGE UP (ref 135–145)
WBC # BLD: 5.82 K/UL — SIGNIFICANT CHANGE UP (ref 3.8–10.5)
WBC # FLD AUTO: 5.82 K/UL — SIGNIFICANT CHANGE UP (ref 3.8–10.5)

## 2024-10-29 PROCEDURE — 99222 1ST HOSP IP/OBS MODERATE 55: CPT

## 2024-10-29 PROCEDURE — 99232 SBSQ HOSP IP/OBS MODERATE 35: CPT

## 2024-10-29 RX ADMIN — Medication 12.5 MILLIGRAM(S): at 10:36

## 2024-10-29 RX ADMIN — ISOSORBIDE MONONITRATE 60 MILLIGRAM(S): 60 TABLET, EXTENDED RELEASE ORAL at 10:35

## 2024-10-29 RX ADMIN — TICAGRELOR 90 MILLIGRAM(S): 90 TABLET ORAL at 06:39

## 2024-10-29 RX ADMIN — RANOLAZINE 500 MILLIGRAM(S): 500 TABLET, FILM COATED, EXTENDED RELEASE ORAL at 06:40

## 2024-10-29 RX ADMIN — LOSARTAN POTASSIUM 25 MILLIGRAM(S): 25 TABLET ORAL at 12:26

## 2024-10-29 RX ADMIN — HEPARIN SODIUM 5000 UNIT(S): 10000 INJECTION INTRAVENOUS; SUBCUTANEOUS at 06:40

## 2024-10-29 RX ADMIN — CHLORHEXIDINE GLUCONATE 1 APPLICATION(S): 40 SOLUTION TOPICAL at 06:45

## 2024-10-29 RX ADMIN — NEBIVOLOL 2.5 MILLIGRAM(S): 10 TABLET ORAL at 06:39

## 2024-10-29 RX ADMIN — DAPAGLIFLOZIN 10 MILLIGRAM(S): 10 TABLET, FILM COATED ORAL at 12:25

## 2024-10-29 RX ADMIN — NITROFURANTOIN 100 MILLIGRAM(S): 25 SUSPENSION ORAL at 06:40

## 2024-10-29 RX ADMIN — Medication 81 MILLIGRAM(S): at 12:26

## 2024-10-29 NOTE — CONSULT NOTE ADULT - ASSESSMENT
80 yo M with a PMH of HTN, HLD, T2DM, CAD (s/p FAITH x3), ADRIÁN on CPAP, asthma, BPH, and OA who presented originally on 10/21 to cath lab for planned rota-assisted PCI of LAD. Now s/p rota FAITH x3 in p/mLAD with procedure c/b no flow in dLAD requiring IABP for coronary reperfusion (removed on 10/23) and stay in the CCU. Course was further c/b L femoral hematoma which has since resolved as well as chest pain and ST elevations in V2-V4 on 10/24, patient started on nitrates first with nitroglycerin drip then transitioned to imdur with continued improvement in chest pain. Stepped down to [facility] for further management. Medicine consulted for comanagement.     #CAD s/p FAITH x3, rota-assisted PCI to LAD x3  #angina   -appreciate management per cardiology team  -angina improved on ranolazine 500 mg BID, imdur 60 mg daily, nebivolol 2.5 mg daily   -continue DAPT with ASA and ticagrelor 90 mg BID   -HTN management as per below    #HFrEF  -newly diagnosed on limited TTE from 10/28, now with estimated EF of around 35%   -GDMT: continue dapagliflozin 10 mg daily, losartan 25 mg daily, spironolactone 12.5 mg daily   -currently euvolemic not on any diuretic medications     #HTN   -continue nebivolol 2.5 mg daily, imdur 60 mg daily, losartan 25 mg daily, spironolactone 12.5 mg daily as above     #HLD   -continue atorvastatin 80 mg nightly     #T2DM   -continue ISS  -continue dapagliflozin 10 mg daily     #ADRIÁN on CPAP   -CPAP at night if able     #UTI   -continue macrobid 100 mg BID for 5-7 day course   -urine culture with likely contamination, ok to continue macrobid given patient's symptoms seem to be improving on medication     DVT ppx: [facility]

## 2024-10-29 NOTE — CONSULT NOTE ADULT - SUBJECTIVE AND OBJECTIVE BOX
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 Now s/p rota FAITH x3 in p/mLAD with procedure c/b no flow in dLAD requiring IABP for coronary reperfusion (removed on 10/23) and stay in the CCU. Course was further c/b L femoral hematoma which has since resolved as well as chest pain and ST elevations in V2-V4 on 10/24, patient started on nitrates first with nitroglycerin drip then transitioned to imdur with continued improvement in chest pain. Stepped down to 5 Uris for further management.     Seen at bedside this morning, felt well overall. Was having some vague chest pain yesterday but has not felt any pain overnight or today. Breathing feels comfortable, tolerating PO.     MEDICATIONS  (STANDING):  aspirin enteric coated 81 milliGRAM(s) Oral daily  atorvastatin 80 milliGRAM(s) Oral at bedtime  chlorhexidine 2% Cloths 1 Application(s) Topical <User Schedule>  dapagliflozin 10 milliGRAM(s) Oral daily  dextrose 5%. 1000 milliLiter(s) (50 mL/Hr) IV Continuous <Continuous>  dextrose 5%. 1000 milliLiter(s) (100 mL/Hr) IV Continuous <Continuous>  dextrose 50% Injectable 25 Gram(s) IV Push once  dextrose 50% Injectable 12.5 Gram(s) IV Push once  dextrose 50% Injectable 25 Gram(s) IV Push once  glucagon  Injectable 1 milliGRAM(s) IntraMuscular once  heparin   Injectable 5000 Unit(s) SubCutaneous every 8 hours  insulin lispro (ADMELOG) corrective regimen sliding scale   SubCutaneous Before meals and at bedtime  isosorbide   mononitrate ER Tablet (IMDUR) 60 milliGRAM(s) Oral <User Schedule>  losartan 25 milliGRAM(s) Oral every 24 hours  nebivolol 2.5 milliGRAM(s) Oral daily  nitrofurantoin monohydrate/macrocrystals (MACROBID) 100 milliGRAM(s) Oral two times a day  ranolazine 500 milliGRAM(s) Oral two times a day  spironolactone 12.5 milliGRAM(s) Oral daily  ticagrelor 90 milliGRAM(s) Oral every 12 hours    MEDICATIONS  (PRN):  dextrose Oral Gel 15 Gram(s) Oral once PRN Blood Glucose LESS THAN 70 milliGRAM(s)/deciliter    CAPILLARY BLOOD GLUCOSE      POCT Blood Glucose.: 114 mg/dL (29 Oct 2024 11:55)  POCT Blood Glucose.: 77 mg/dL (29 Oct 2024 07:54)  POCT Blood Glucose.: 119 mg/dL (28 Oct 2024 21:41)  POCT Blood Glucose.: 118 mg/dL (28 Oct 2024 16:54)    I&O's Summary    28 Oct 2024 07:01  -  29 Oct 2024 07:00  --------------------------------------------------------  IN: 480 mL / OUT: 0 mL / NET: 480 mL    29 Oct 2024 07:01  -  29 Oct 2024 13:40  --------------------------------------------------------  IN: 480 mL / OUT: 0 mL / NET: 480 mL        PHYSICAL EXAM:  Vital Signs Last 24 Hrs  T(C): 36.5 (29 Oct 2024 04:00), Max: 37.1 (28 Oct 2024 21:00)  T(F): 97.7 (29 Oct 2024 04:00), Max: 98.7 (28 Oct 2024 21:00)  HR: 76 (29 Oct 2024 11:55) (63 - 76)  BP: 116/60 (29 Oct 2024 11:55) (101/59 - 127/68)  BP(mean): --  RR: 17 (29 Oct 2024 10:25) (16 - 18)  SpO2: 98% (29 Oct 2024 10:25) (95% - 99%)    Parameters below as of 29 Oct 2024 11:55  Patient On (Oxygen Delivery Method): room air    EXAM:   Appears comfortable   MMM  Normal WOB on RA, CTAB   RRR, no mrg but distant heart sounds   Abdomen soft, nontender, nondistended  Extremities warm and without edema   AOX3, no focal neuro deficits     LABS:                        11.3   5.82  )-----------( 246      ( 29 Oct 2024 05:30 )             37.4     10-29    136  |  100  |  19  ----------------------------<  106[H]  4.2   |  24  |  1.25    Ca    9.4      29 Oct 2024 05:30  Mg     1.9     10-29            Urinalysis Basic - ( 29 Oct 2024 05:30 )    Color: x / Appearance: x / SG: x / pH: x  Gluc: 106 mg/dL / Ketone: x  / Bili: x / Urobili: x   Blood: x / Protein: x / Nitrite: x   Leuk Esterase: x / RBC: x / WBC x   Sq Epi: x / Non Sq Epi: x / Bacteria: x            RADIOLOGY & ADDITIONAL TESTS:  Imaging from Last 24 Hours:  None new

## 2024-10-30 ENCOUNTER — TRANSCRIPTION ENCOUNTER (OUTPATIENT)
Age: 79
End: 2024-10-30

## 2024-10-30 ENCOUNTER — NON-APPOINTMENT (OUTPATIENT)
Age: 79
End: 2024-10-30

## 2024-11-01 ENCOUNTER — TRANSCRIPTION ENCOUNTER (OUTPATIENT)
Age: 79
End: 2024-11-01

## 2024-11-04 ENCOUNTER — APPOINTMENT (OUTPATIENT)
Dept: CARE COORDINATION | Facility: HOME HEALTH | Age: 79
End: 2024-11-04
Payer: MEDICARE

## 2024-11-04 VITALS
DIASTOLIC BLOOD PRESSURE: 65 MMHG | TEMPERATURE: 98.5 F | OXYGEN SATURATION: 98 % | HEART RATE: 68 BPM | RESPIRATION RATE: 18 BRPM | SYSTOLIC BLOOD PRESSURE: 112 MMHG

## 2024-11-04 DIAGNOSIS — I21.9 ACUTE MYOCARDIAL INFARCTION, UNSPECIFIED: ICD-10-CM

## 2024-11-04 DIAGNOSIS — E03.9 HYPOTHYROIDISM, UNSPECIFIED: ICD-10-CM

## 2024-11-04 DIAGNOSIS — E11.59 TYPE 2 DIABETES MELLITUS WITH OTHER CIRCULATORY COMPLICATIONS: ICD-10-CM

## 2024-11-04 DIAGNOSIS — I15.2 TYPE 2 DIABETES MELLITUS WITH OTHER CIRCULATORY COMPLICATIONS: ICD-10-CM

## 2024-11-04 DIAGNOSIS — E78.5 HYPERLIPIDEMIA, UNSPECIFIED: ICD-10-CM

## 2024-11-04 DIAGNOSIS — I25.10 ATHEROSCLEROTIC HEART DISEASE OF NATIVE CORONARY ARTERY W/OUT ANGINA PECTORIS: ICD-10-CM

## 2024-11-04 PROCEDURE — 99349 HOME/RES VST EST MOD MDM 40: CPT

## 2024-11-05 ENCOUNTER — APPOINTMENT (OUTPATIENT)
Dept: HEART AND VASCULAR | Facility: CLINIC | Age: 79
End: 2024-11-05
Payer: MEDICARE

## 2024-11-05 VITALS
HEART RATE: 77 BPM | DIASTOLIC BLOOD PRESSURE: 64 MMHG | HEIGHT: 70 IN | SYSTOLIC BLOOD PRESSURE: 104 MMHG | WEIGHT: 129.2 LBS | BODY MASS INDEX: 18.5 KG/M2 | OXYGEN SATURATION: 95 %

## 2024-11-05 PROCEDURE — 99214 OFFICE O/P EST MOD 30 MIN: CPT

## 2024-11-05 RX ORDER — ATORVASTATIN CALCIUM 80 MG/1
80 TABLET, FILM COATED ORAL
Qty: 90 | Refills: 2 | Status: ACTIVE | COMMUNITY
Start: 1900-01-01 | End: 1900-01-01

## 2024-11-05 RX ORDER — ISOSORBIDE MONONITRATE 30 MG/1
30 TABLET, EXTENDED RELEASE ORAL DAILY
Qty: 90 | Refills: 3 | Status: ACTIVE | COMMUNITY
Start: 1900-01-01 | End: 1900-01-01

## 2024-11-05 RX ORDER — PANTOPRAZOLE 40 MG/1
40 TABLET, DELAYED RELEASE ORAL DAILY
Qty: 90 | Refills: 3 | Status: ACTIVE | COMMUNITY
Start: 2024-11-05 | End: 1900-01-01

## 2024-11-05 RX ORDER — SPIRONOLACTONE 25 MG/1
25 TABLET ORAL
Qty: 90 | Refills: 3 | Status: ACTIVE | COMMUNITY
Start: 1900-01-01 | End: 1900-01-01

## 2024-11-05 RX ORDER — RANOLAZINE 500 MG/1
500 TABLET, EXTENDED RELEASE ORAL
Refills: 0 | Status: DISCONTINUED | COMMUNITY
End: 2024-11-05

## 2024-11-05 RX ORDER — DAPAGLIFLOZIN 10 MG/1
10 TABLET, FILM COATED ORAL DAILY
Qty: 90 | Refills: 3 | Status: ACTIVE | COMMUNITY
Start: 1900-01-01 | End: 1900-01-01

## 2024-11-05 RX ORDER — SACUBITRIL AND VALSARTAN 24; 26 MG/1; MG/1
24-26 TABLET, FILM COATED ORAL TWICE DAILY
Qty: 180 | Refills: 3 | Status: ACTIVE | COMMUNITY
Start: 1900-01-01 | End: 1900-01-01

## 2024-11-05 RX ORDER — TICAGRELOR 90 MG/1
90 TABLET ORAL TWICE DAILY
Qty: 180 | Refills: 3 | Status: ACTIVE | COMMUNITY
Start: 1900-01-01 | End: 1900-01-01

## 2024-11-06 DIAGNOSIS — Z96.652 PRESENCE OF LEFT ARTIFICIAL KNEE JOINT: ICD-10-CM

## 2024-11-06 DIAGNOSIS — I50.21 ACUTE SYSTOLIC (CONGESTIVE) HEART FAILURE: ICD-10-CM

## 2024-11-06 DIAGNOSIS — M19.90 UNSPECIFIED OSTEOARTHRITIS, UNSPECIFIED SITE: ICD-10-CM

## 2024-11-06 DIAGNOSIS — D64.9 ANEMIA, UNSPECIFIED: ICD-10-CM
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
## 2024-11-06 DIAGNOSIS — L76.32 POSTPROCEDURAL HEMATOMA OF SKIN AND SUBCUTANEOUS TISSUE FOLLOWING OTHER PROCEDURE: ICD-10-CM

## 2024-11-06 DIAGNOSIS — R31.9 HEMATURIA, UNSPECIFIED: ICD-10-CM

## 2024-11-06 DIAGNOSIS — I25.84 CORONARY ATHEROSCLEROSIS DUE TO CALCIFIED CORONARY LESION: ICD-10-CM

## 2024-11-06 DIAGNOSIS — I21.4 NON-ST ELEVATION (NSTEMI) MYOCARDIAL INFARCTION: ICD-10-CM

## 2024-11-06 DIAGNOSIS — I11.0 HYPERTENSIVE HEART DISEASE WITH HEART FAILURE: ICD-10-CM

## 2024-11-06 DIAGNOSIS — Z95.5 PRESENCE OF CORONARY ANGIOPLASTY IMPLANT AND GRAFT: ICD-10-CM

## 2024-11-06 DIAGNOSIS — Z79.02 LONG TERM (CURRENT) USE OF ANTITHROMBOTICS/ANTIPLATELETS: ICD-10-CM

## 2024-11-06 DIAGNOSIS — G47.33 OBSTRUCTIVE SLEEP APNEA (ADULT) (PEDIATRIC): ICD-10-CM

## 2024-11-06 DIAGNOSIS — E78.5 HYPERLIPIDEMIA, UNSPECIFIED: ICD-10-CM

## 2024-11-06 DIAGNOSIS — N40.0 BENIGN PROSTATIC HYPERPLASIA WITHOUT LOWER URINARY TRACT SYMPTOMS: ICD-10-CM

## 2024-11-06 DIAGNOSIS — J45.909 UNSPECIFIED ASTHMA, UNCOMPLICATED: ICD-10-CM

## 2024-11-06 DIAGNOSIS — Z87.891 PERSONAL HISTORY OF NICOTINE DEPENDENCE: ICD-10-CM

## 2024-11-06 DIAGNOSIS — N39.0 URINARY TRACT INFECTION, SITE NOT SPECIFIED: ICD-10-CM

## 2024-11-06 DIAGNOSIS — I25.119 ATHEROSCLEROTIC HEART DISEASE OF NATIVE CORONARY ARTERY WITH UNSPECIFIED ANGINA PECTORIS: ICD-10-CM
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
## 2024-11-06 DIAGNOSIS — E11.9 TYPE 2 DIABETES MELLITUS WITHOUT COMPLICATIONS: ICD-10-CM

## 2024-11-06 DIAGNOSIS — Z79.82 LONG TERM (CURRENT) USE OF ASPIRIN: ICD-10-CM

## 2024-11-07 ENCOUNTER — TRANSCRIPTION ENCOUNTER (OUTPATIENT)
Age: 79
End: 2024-11-07

## 2024-11-12 ENCOUNTER — APPOINTMENT (OUTPATIENT)
Dept: INTERNAL MEDICINE | Facility: CLINIC | Age: 79
End: 2024-11-12
Payer: MEDICARE

## 2024-11-12 VITALS
BODY MASS INDEX: 18.9 KG/M2 | RESPIRATION RATE: 16 BRPM | TEMPERATURE: 97.1 F | DIASTOLIC BLOOD PRESSURE: 66 MMHG | WEIGHT: 132 LBS | SYSTOLIC BLOOD PRESSURE: 106 MMHG | HEIGHT: 70 IN | OXYGEN SATURATION: 97 % | HEART RATE: 67 BPM

## 2024-11-12 DIAGNOSIS — R94.4 ABNORMAL RESULTS OF KIDNEY FUNCTION STUDIES: ICD-10-CM

## 2024-11-12 DIAGNOSIS — R97.20 ELEVATED PROSTATE, SPECIFIC ANTIGEN [PSA]: ICD-10-CM

## 2024-11-12 DIAGNOSIS — N40.0 BENIGN PROSTATIC HYPERPLASIA WITHOUT LOWER URINARY TRACT SYMPMS: ICD-10-CM

## 2024-11-12 PROCEDURE — 36415 COLL VENOUS BLD VENIPUNCTURE: CPT

## 2024-11-12 PROCEDURE — 99495 TRANSJ CARE MGMT MOD F2F 14D: CPT

## 2024-11-12 PROCEDURE — G0444 DEPRESSION SCREEN ANNUAL: CPT

## 2024-11-13 ENCOUNTER — TRANSCRIPTION ENCOUNTER (OUTPATIENT)
Age: 79
End: 2024-11-13

## 2024-11-13 PROBLEM — R97.20 ELEVATED PSA: Status: ACTIVE | Noted: 2024-11-13

## 2024-11-13 PROBLEM — R94.4 DECREASED GFR: Status: ACTIVE | Noted: 2024-11-13

## 2024-11-13 LAB
25(OH)D3 SERPL-MCNC: 34 NG/ML
ALBUMIN SERPL ELPH-MCNC: 4 G/DL
ALP BLD-CCNC: 58 U/L
ALT SERPL-CCNC: 18 U/L
ANION GAP SERPL CALC-SCNC: 12 MMOL/L
AST SERPL-CCNC: 22 U/L
BASOPHILS # BLD AUTO: 0.03 K/UL
BASOPHILS NFR BLD AUTO: 0.7 %
BILIRUB SERPL-MCNC: 0.4 MG/DL
BUN SERPL-MCNC: 21 MG/DL
CALCIUM SERPL-MCNC: 9.8 MG/DL
CHLORIDE SERPL-SCNC: 108 MMOL/L
CHOLEST SERPL-MCNC: 106 MG/DL
CO2 SERPL-SCNC: 24 MMOL/L
CREAT SERPL-MCNC: 1.28 MG/DL
CREAT SPEC-SCNC: 126 MG/DL
EGFR: 56 ML/MIN/1.73M2
EOSINOPHIL # BLD AUTO: 0.05 K/UL
EOSINOPHIL NFR BLD AUTO: 1.2 %
ESTIMATED AVERAGE GLUCOSE: 128 MG/DL
GLUCOSE SERPL-MCNC: 160 MG/DL
HBA1C MFR BLD HPLC: 6.1 %
HCT VFR BLD CALC: 42.3 %
HDLC SERPL-MCNC: 41 MG/DL
HGB BLD-MCNC: 13 G/DL
IMM GRANULOCYTES NFR BLD AUTO: 1.2 %
LDLC SERPL CALC-MCNC: 51 MG/DL
LYMPHOCYTES # BLD AUTO: 0.76 K/UL
LYMPHOCYTES NFR BLD AUTO: 18.7 %
MAN DIFF?: NORMAL
MCHC RBC-ENTMCNC: 25.8 PG
MCHC RBC-ENTMCNC: 30.7 G/DL
MCV RBC AUTO: 83.9 FL
MICROALBUMIN 24H UR DL<=1MG/L-MCNC: 1.7 MG/DL
MICROALBUMIN/CREAT 24H UR-RTO: 13 MG/G
MONOCYTES # BLD AUTO: 0.41 K/UL
MONOCYTES NFR BLD AUTO: 10.1 %
NEUTROPHILS # BLD AUTO: 2.76 K/UL
NEUTROPHILS NFR BLD AUTO: 68.1 %
NONHDLC SERPL-MCNC: 65 MG/DL
PLATELET # BLD AUTO: 259 K/UL
POTASSIUM SERPL-SCNC: 4.2 MMOL/L
PROT SERPL-MCNC: 7 G/DL
PSA SERPL-MCNC: 3.55 NG/ML
RBC # BLD: 5.04 M/UL
RBC # FLD: 15 %
SODIUM SERPL-SCNC: 144 MMOL/L
T3FREE SERPL-MCNC: 2.64 PG/ML
T4 FREE SERPL-MCNC: 1.5 NG/DL
TRIGL SERPL-MCNC: 63 MG/DL
TSH SERPL-ACNC: 1.46 UIU/ML
WBC # FLD AUTO: 4.06 K/UL

## 2024-11-18 ENCOUNTER — TRANSCRIPTION ENCOUNTER (OUTPATIENT)
Age: 79
End: 2024-11-18

## 2024-11-19 ENCOUNTER — APPOINTMENT (OUTPATIENT)
Dept: HEART AND VASCULAR | Facility: CLINIC | Age: 79
End: 2024-11-19
Payer: MEDICARE

## 2024-11-19 VITALS
BODY MASS INDEX: 18.61 KG/M2 | OXYGEN SATURATION: 93 % | HEIGHT: 70 IN | WEIGHT: 130 LBS | SYSTOLIC BLOOD PRESSURE: 110 MMHG | HEART RATE: 58 BPM | DIASTOLIC BLOOD PRESSURE: 60 MMHG

## 2024-11-19 PROCEDURE — 99213 OFFICE O/P EST LOW 20 MIN: CPT

## 2024-11-26 ENCOUNTER — TRANSCRIPTION ENCOUNTER (OUTPATIENT)
Age: 79
End: 2024-11-26

## 2024-11-26 PROCEDURE — 80061 LIPID PANEL: CPT

## 2024-11-26 PROCEDURE — 85027 COMPLETE CBC AUTOMATED: CPT

## 2024-11-26 PROCEDURE — 86850 RBC ANTIBODY SCREEN: CPT

## 2024-11-26 PROCEDURE — 93321 DOPPLER ECHO F-UP/LMTD STD: CPT

## 2024-11-26 PROCEDURE — 93005 ELECTROCARDIOGRAM TRACING: CPT

## 2024-11-26 PROCEDURE — 86901 BLOOD TYPING SEROLOGIC RH(D): CPT

## 2024-11-26 PROCEDURE — 85025 COMPLETE CBC W/AUTO DIFF WBC: CPT

## 2024-11-26 PROCEDURE — 83550 IRON BINDING TEST: CPT

## 2024-11-26 PROCEDURE — 97161 PT EVAL LOW COMPLEX 20 MIN: CPT

## 2024-11-26 PROCEDURE — 83735 ASSAY OF MAGNESIUM: CPT

## 2024-11-26 PROCEDURE — 84100 ASSAY OF PHOSPHORUS: CPT

## 2024-11-26 PROCEDURE — 80053 COMPREHEN METABOLIC PANEL: CPT

## 2024-11-26 PROCEDURE — 83036 HEMOGLOBIN GLYCOSYLATED A1C: CPT

## 2024-11-26 PROCEDURE — 84484 ASSAY OF TROPONIN QUANT: CPT

## 2024-11-26 PROCEDURE — 81001 URINALYSIS AUTO W/SCOPE: CPT

## 2024-11-26 PROCEDURE — 87086 URINE CULTURE/COLONY COUNT: CPT

## 2024-11-26 PROCEDURE — 83540 ASSAY OF IRON: CPT

## 2024-11-26 PROCEDURE — 97116 GAIT TRAINING THERAPY: CPT

## 2024-11-26 PROCEDURE — 85610 PROTHROMBIN TIME: CPT

## 2024-11-26 PROCEDURE — 71045 X-RAY EXAM CHEST 1 VIEW: CPT

## 2024-11-26 PROCEDURE — C1889: CPT

## 2024-11-26 PROCEDURE — 82550 ASSAY OF CK (CPK): CPT

## 2024-11-26 PROCEDURE — C1724: CPT

## 2024-11-26 PROCEDURE — 82962 GLUCOSE BLOOD TEST: CPT

## 2024-11-26 PROCEDURE — 82553 CREATINE MB FRACTION: CPT

## 2024-11-26 PROCEDURE — C8929: CPT

## 2024-11-26 PROCEDURE — C1753: CPT

## 2024-11-26 PROCEDURE — C1874: CPT

## 2024-11-26 PROCEDURE — C1769: CPT

## 2024-11-26 PROCEDURE — C1894: CPT

## 2024-11-26 PROCEDURE — 36415 COLL VENOUS BLD VENIPUNCTURE: CPT

## 2024-11-26 PROCEDURE — C1887: CPT

## 2024-11-26 PROCEDURE — 85730 THROMBOPLASTIN TIME PARTIAL: CPT

## 2024-11-26 PROCEDURE — C1725: CPT

## 2024-11-26 PROCEDURE — 86900 BLOOD TYPING SEROLOGIC ABO: CPT

## 2024-11-26 PROCEDURE — 80048 BASIC METABOLIC PNL TOTAL CA: CPT

## 2025-01-21 ENCOUNTER — NON-APPOINTMENT (OUTPATIENT)
Age: 80
End: 2025-01-21

## 2025-01-21 ENCOUNTER — APPOINTMENT (OUTPATIENT)
Dept: HEART AND VASCULAR | Facility: CLINIC | Age: 80
End: 2025-01-21
Payer: MEDICARE

## 2025-01-21 VITALS
BODY MASS INDEX: 18.18 KG/M2 | OXYGEN SATURATION: 98 % | WEIGHT: 127 LBS | SYSTOLIC BLOOD PRESSURE: 110 MMHG | TEMPERATURE: 98.1 F | HEART RATE: 58 BPM | DIASTOLIC BLOOD PRESSURE: 70 MMHG | HEIGHT: 70 IN

## 2025-01-21 DIAGNOSIS — I50.9 HEART FAILURE, UNSPECIFIED: ICD-10-CM

## 2025-01-21 PROCEDURE — 93000 ELECTROCARDIOGRAM COMPLETE: CPT

## 2025-01-21 PROCEDURE — 99214 OFFICE O/P EST MOD 30 MIN: CPT

## 2025-01-21 RX ORDER — RANOLAZINE 500 MG/1
500 TABLET, EXTENDED RELEASE ORAL
Refills: 0 | Status: ACTIVE | COMMUNITY

## 2025-01-21 RX ORDER — BUDESONIDE AND FORMOTEROL FUMARATE DIHYDRATE 160; 4.5 UG/1; UG/1
160-4.5 AEROSOL RESPIRATORY (INHALATION) TWICE DAILY
Qty: 1 | Refills: 3 | Status: ACTIVE | COMMUNITY
Start: 2025-01-21

## 2025-01-29 ENCOUNTER — APPOINTMENT (OUTPATIENT)
Dept: ENDOCRINOLOGY | Facility: CLINIC | Age: 80
End: 2025-01-29

## 2025-01-29 VITALS
SYSTOLIC BLOOD PRESSURE: 118 MMHG | WEIGHT: 131 LBS | DIASTOLIC BLOOD PRESSURE: 84 MMHG | BODY MASS INDEX: 18.75 KG/M2 | HEIGHT: 70 IN | OXYGEN SATURATION: 96 % | HEART RATE: 88 BPM

## 2025-01-29 DIAGNOSIS — E11.59 TYPE 2 DIABETES MELLITUS WITH OTHER CIRCULATORY COMPLICATIONS: ICD-10-CM

## 2025-01-29 PROCEDURE — 99214 OFFICE O/P EST MOD 30 MIN: CPT

## 2025-01-31 ENCOUNTER — NON-APPOINTMENT (OUTPATIENT)
Age: 80
End: 2025-01-31

## 2025-02-09 PROBLEM — M48.061 DEGENERATIVE LUMBAR SPINAL STENOSIS: Status: RESOLVED | Noted: 2023-04-13 | Resolved: 2025-02-09

## 2025-02-09 PROBLEM — Z23 NEED FOR VACCINATION FOR STREP PNEUMONIAE + INFLUENZA: Status: RESOLVED | Noted: 2024-08-26 | Resolved: 2025-02-09

## 2025-02-09 PROBLEM — I50.20 HFREF (HEART FAILURE WITH REDUCED EJECTION FRACTION): Status: ACTIVE | Noted: 2025-01-21

## 2025-02-09 PROBLEM — L60.1 ONYCHOLYSIS OF TOENAIL: Status: RESOLVED | Noted: 2024-03-19 | Resolved: 2025-02-09

## 2025-02-09 PROBLEM — Z00.00 MEDICARE ANNUAL WELLNESS VISIT, SUBSEQUENT: Status: RESOLVED | Noted: 2024-02-28 | Resolved: 2025-02-09

## 2025-02-09 PROBLEM — Z12.11 SCREEN FOR COLON CANCER: Status: RESOLVED | Noted: 2024-02-28 | Resolved: 2025-02-09

## 2025-02-09 PROBLEM — Z23 NEED FOR SHINGLES VACCINE: Status: RESOLVED | Noted: 2024-08-26 | Resolved: 2025-02-09

## 2025-02-09 PROBLEM — L60.2 ONYCHOGRYPHOSIS: Status: RESOLVED | Noted: 2024-03-19 | Resolved: 2025-02-09

## 2025-02-09 PROBLEM — R94.39 ABNORMAL STRESS TEST: Status: RESOLVED | Noted: 2021-07-21 | Resolved: 2025-02-09

## 2025-02-09 PROBLEM — L60.3 ONYCHODYSTROPHY: Status: RESOLVED | Noted: 2024-03-19 | Resolved: 2025-02-09

## 2025-02-09 PROBLEM — Z86.19 HISTORY OF ONYCHOMYCOSIS: Status: RESOLVED | Noted: 2024-03-19 | Resolved: 2025-02-09

## 2025-02-12 ENCOUNTER — APPOINTMENT (OUTPATIENT)
Dept: CARDIOLOGY | Facility: CLINIC | Age: 80
End: 2025-02-12
Payer: MEDICARE

## 2025-02-12 ENCOUNTER — NON-APPOINTMENT (OUTPATIENT)
Age: 80
End: 2025-02-12

## 2025-02-12 VITALS
DIASTOLIC BLOOD PRESSURE: 58 MMHG | BODY MASS INDEX: 18.32 KG/M2 | WEIGHT: 128 LBS | SYSTOLIC BLOOD PRESSURE: 110 MMHG | HEIGHT: 70 IN

## 2025-02-12 DIAGNOSIS — Z95.5 PRESENCE OF CORONARY ANGIOPLASTY IMPLANT AND GRAFT: ICD-10-CM

## 2025-02-12 DIAGNOSIS — E11.59 TYPE 2 DIABETES MELLITUS WITH OTHER CIRCULATORY COMPLICATIONS: ICD-10-CM

## 2025-02-12 DIAGNOSIS — I25.10 ATHEROSCLEROTIC HEART DISEASE OF NATIVE CORONARY ARTERY W/OUT ANGINA PECTORIS: ICD-10-CM

## 2025-02-12 DIAGNOSIS — I50.20 UNSPECIFIED SYSTOLIC (CONGESTIVE) HEART FAILURE: ICD-10-CM

## 2025-02-12 DIAGNOSIS — E78.41 ELEVATED LIPOPROTEIN(A): ICD-10-CM

## 2025-02-12 DIAGNOSIS — G47.33 OBSTRUCTIVE SLEEP APNEA (ADULT) (PEDIATRIC): ICD-10-CM

## 2025-02-12 DIAGNOSIS — I21.9 ACUTE MYOCARDIAL INFARCTION, UNSPECIFIED: ICD-10-CM

## 2025-02-12 DIAGNOSIS — E78.5 HYPERLIPIDEMIA, UNSPECIFIED: ICD-10-CM

## 2025-02-12 PROCEDURE — G2211 COMPLEX E/M VISIT ADD ON: CPT

## 2025-02-12 PROCEDURE — 99215 OFFICE O/P EST HI 40 MIN: CPT

## 2025-02-12 PROCEDURE — 93000 ELECTROCARDIOGRAM COMPLETE: CPT

## 2025-03-04 ENCOUNTER — APPOINTMENT (OUTPATIENT)
Dept: CARDIOLOGY | Facility: CLINIC | Age: 80
End: 2025-03-04
Payer: MEDICARE

## 2025-03-04 VITALS — WEIGHT: 129 LBS | SYSTOLIC BLOOD PRESSURE: 100 MMHG | DIASTOLIC BLOOD PRESSURE: 60 MMHG | BODY MASS INDEX: 18.51 KG/M2

## 2025-03-04 DIAGNOSIS — E78.5 HYPERLIPIDEMIA, UNSPECIFIED: ICD-10-CM

## 2025-03-04 DIAGNOSIS — I25.10 ATHEROSCLEROTIC HEART DISEASE OF NATIVE CORONARY ARTERY W/OUT ANGINA PECTORIS: ICD-10-CM

## 2025-03-04 DIAGNOSIS — Z95.5 PRESENCE OF CORONARY ANGIOPLASTY IMPLANT AND GRAFT: ICD-10-CM

## 2025-03-04 DIAGNOSIS — I50.20 UNSPECIFIED SYSTOLIC (CONGESTIVE) HEART FAILURE: ICD-10-CM

## 2025-03-04 PROCEDURE — 99214 OFFICE O/P EST MOD 30 MIN: CPT

## 2025-03-04 RX ORDER — CLOPIDOGREL BISULFATE 75 MG/1
75 TABLET, FILM COATED ORAL DAILY
Qty: 90 | Refills: 3 | Status: ACTIVE | COMMUNITY
Start: 2025-03-04 | End: 1900-01-01

## 2025-04-03 RX ORDER — LOSARTAN POTASSIUM 25 MG/1
25 TABLET, FILM COATED ORAL
Qty: 60 | Refills: 3 | Status: ACTIVE | COMMUNITY
Start: 2025-04-03 | End: 1900-01-01

## 2025-05-06 ENCOUNTER — APPOINTMENT (OUTPATIENT)
Dept: CARDIOLOGY | Facility: CLINIC | Age: 80
End: 2025-05-06
Payer: MEDICARE

## 2025-05-06 PROCEDURE — 93306 TTE W/DOPPLER COMPLETE: CPT

## 2025-06-12 ENCOUNTER — APPOINTMENT (OUTPATIENT)
Dept: INTERNAL MEDICINE | Facility: CLINIC | Age: 80
End: 2025-06-12
Payer: MEDICARE

## 2025-06-12 VITALS
WEIGHT: 126 LBS | HEART RATE: 56 BPM | SYSTOLIC BLOOD PRESSURE: 108 MMHG | BODY MASS INDEX: 18.04 KG/M2 | TEMPERATURE: 97.6 F | DIASTOLIC BLOOD PRESSURE: 70 MMHG | RESPIRATION RATE: 16 BRPM | HEIGHT: 70 IN | OXYGEN SATURATION: 96 %

## 2025-06-12 PROBLEM — Z86.39 HISTORY OF IRON DEFICIENCY: Status: RESOLVED | Noted: 2021-03-03 | Resolved: 2025-06-12

## 2025-06-12 PROBLEM — Z87.898 HISTORY OF ELEVATED PROSTATE SPECIFIC ANTIGEN (PSA): Status: RESOLVED | Noted: 2024-11-13 | Resolved: 2025-06-12

## 2025-06-12 PROBLEM — Z87.19 HISTORY OF HEMORRHOIDS: Status: RESOLVED | Noted: 2018-06-27 | Resolved: 2025-06-12

## 2025-06-12 PROBLEM — Z86.39 HISTORY OF HYPOTHYROIDISM: Status: RESOLVED | Noted: 2019-10-25 | Resolved: 2025-06-12

## 2025-06-12 PROBLEM — Z87.39 HISTORY OF POLYMYALGIA RHEUMATICA: Status: RESOLVED | Noted: 2020-08-31 | Resolved: 2025-06-12

## 2025-06-12 PROBLEM — Z87.438 HISTORY OF ERECTILE DYSFUNCTION: Status: RESOLVED | Noted: 2019-06-07 | Resolved: 2025-06-12

## 2025-06-12 PROBLEM — E53.8 VITAMIN B12 DEFICIENCY: Status: RESOLVED | Noted: 2019-10-25 | Resolved: 2025-06-12

## 2025-06-12 PROBLEM — E53.1 VITAMIN B6 DEFICIENCY SYNDROME: Status: RESOLVED | Noted: 2019-10-25 | Resolved: 2025-06-12

## 2025-06-12 PROCEDURE — 99214 OFFICE O/P EST MOD 30 MIN: CPT | Mod: 25

## 2025-06-12 PROCEDURE — G0439: CPT

## 2025-06-12 PROCEDURE — 36415 COLL VENOUS BLD VENIPUNCTURE: CPT

## 2025-06-12 PROCEDURE — G2211 COMPLEX E/M VISIT ADD ON: CPT

## 2025-06-13 ENCOUNTER — APPOINTMENT (OUTPATIENT)
Dept: ENDOCRINOLOGY | Facility: CLINIC | Age: 80
End: 2025-06-13
Payer: MEDICARE

## 2025-06-13 VITALS
WEIGHT: 126 LBS | HEART RATE: 69 BPM | HEIGHT: 70 IN | SYSTOLIC BLOOD PRESSURE: 120 MMHG | DIASTOLIC BLOOD PRESSURE: 58 MMHG | BODY MASS INDEX: 18.04 KG/M2 | OXYGEN SATURATION: 97 %

## 2025-06-13 LAB
ALBUMIN SERPL ELPH-MCNC: 4.3 G/DL
ALP BLD-CCNC: 56 U/L
ALT SERPL-CCNC: 26 U/L
ANION GAP SERPL CALC-SCNC: 16 MMOL/L
AST SERPL-CCNC: 30 U/L
BILIRUB SERPL-MCNC: 0.4 MG/DL
BUN SERPL-MCNC: 22 MG/DL
CALCIUM SERPL-MCNC: 9.4 MG/DL
CHLORIDE SERPL-SCNC: 103 MMOL/L
CHOLEST SERPL-MCNC: 114 MG/DL
CO2 SERPL-SCNC: 21 MMOL/L
CREAT SERPL-MCNC: 1.08 MG/DL
CREAT SPEC-SCNC: 106 MG/DL
EGFRCR SERPLBLD CKD-EPI 2021: 69 ML/MIN/1.73M2
ESTIMATED AVERAGE GLUCOSE: 143 MG/DL
GLUCOSE SERPL-MCNC: 100 MG/DL
HBA1C MFR BLD HPLC: 6.6 %
HCT VFR BLD CALC: 44.3 %
HDLC SERPL-MCNC: 46 MG/DL
HGB BLD-MCNC: 13.3 G/DL
LDLC SERPL-MCNC: 55 MG/DL
MCHC RBC-ENTMCNC: 24.3 PG
MCHC RBC-ENTMCNC: 30 G/DL
MCV RBC AUTO: 80.8 FL
MICROALBUMIN 24H UR DL<=1MG/L-MCNC: 1.5 MG/DL
MICROALBUMIN/CREAT 24H UR-RTO: 14 MG/G
NONHDLC SERPL-MCNC: 68 MG/DL
PLATELET # BLD AUTO: 171 K/UL
POTASSIUM SERPL-SCNC: 4 MMOL/L
PROT SERPL-MCNC: 6.6 G/DL
PSA SERPL-MCNC: 1.64 NG/ML
RBC # BLD: 5.48 M/UL
RBC # FLD: 18.8 %
SODIUM SERPL-SCNC: 140 MMOL/L
T4 FREE SERPL-MCNC: 1.3 NG/DL
TRIGL SERPL-MCNC: 58 MG/DL
TSH SERPL-ACNC: 2.18 UIU/ML
WBC # FLD AUTO: 3.05 K/UL

## 2025-06-13 PROCEDURE — 99215 OFFICE O/P EST HI 40 MIN: CPT

## 2025-06-13 PROCEDURE — G2211 COMPLEX E/M VISIT ADD ON: CPT

## 2025-06-15 PROBLEM — Z12.5 PROSTATE CANCER SCREENING: Status: RESOLVED | Noted: 2025-06-12 | Resolved: 2025-06-15

## 2025-06-16 ENCOUNTER — APPOINTMENT (OUTPATIENT)
Dept: CARDIOLOGY | Facility: CLINIC | Age: 80
End: 2025-06-16
Payer: MEDICARE

## 2025-06-16 VITALS — WEIGHT: 127 LBS | SYSTOLIC BLOOD PRESSURE: 130 MMHG | BODY MASS INDEX: 18.22 KG/M2 | DIASTOLIC BLOOD PRESSURE: 72 MMHG

## 2025-06-16 PROCEDURE — 93000 ELECTROCARDIOGRAM COMPLETE: CPT

## 2025-06-16 PROCEDURE — 99215 OFFICE O/P EST HI 40 MIN: CPT

## 2025-06-16 RX ORDER — PANTOPRAZOLE 20 MG/1
20 TABLET, DELAYED RELEASE ORAL DAILY
Qty: 90 | Refills: 3 | Status: ACTIVE | COMMUNITY
Start: 1900-01-01 | End: 1900-01-01